# Patient Record
Sex: MALE | Race: WHITE | NOT HISPANIC OR LATINO | Employment: OTHER | URBAN - METROPOLITAN AREA
[De-identification: names, ages, dates, MRNs, and addresses within clinical notes are randomized per-mention and may not be internally consistent; named-entity substitution may affect disease eponyms.]

---

## 2017-01-05 RX ORDER — LEVOTHYROXINE SODIUM 88 UG/1
TABLET ORAL
Qty: 90 TABLET | Refills: 2 | Status: SHIPPED | OUTPATIENT
Start: 2017-01-05 | End: 2017-05-30 | Stop reason: SDUPTHER

## 2017-01-13 ENCOUNTER — OFFICE VISIT (OUTPATIENT)
Dept: FAMILY MEDICINE CLINIC | Facility: CLINIC | Age: 67
End: 2017-01-13

## 2017-01-13 VITALS
DIASTOLIC BLOOD PRESSURE: 60 MMHG | TEMPERATURE: 98.2 F | WEIGHT: 230.4 LBS | BODY MASS INDEX: 32.26 KG/M2 | SYSTOLIC BLOOD PRESSURE: 134 MMHG | HEART RATE: 66 BPM | HEIGHT: 71 IN | OXYGEN SATURATION: 94 % | RESPIRATION RATE: 16 BRPM

## 2017-01-13 DIAGNOSIS — IMO0002 UNCONTROLLED TYPE 2 DIABETES MELLITUS WITH OTHER CIRCULATORY COMPLICATION, WITH LONG-TERM CURRENT USE OF INSULIN: ICD-10-CM

## 2017-01-13 DIAGNOSIS — E03.9 ACQUIRED HYPOTHYROIDISM: ICD-10-CM

## 2017-01-13 DIAGNOSIS — I25.10 CORONARY ARTERIOSCLEROSIS IN NATIVE ARTERY: Primary | ICD-10-CM

## 2017-01-13 DIAGNOSIS — I10 ESSENTIAL HYPERTENSION: ICD-10-CM

## 2017-01-13 LAB
BUN SERPL-MCNC: 23 MG/DL (ref 8–23)
BUN/CREAT SERPL: 19.7 (ref 7–25)
CALCIUM SERPL-MCNC: 9 MG/DL (ref 8.6–10.5)
CHLORIDE SERPL-SCNC: 100 MMOL/L (ref 98–107)
CO2 SERPL-SCNC: 28.3 MMOL/L (ref 22–29)
CREAT SERPL-MCNC: 1.17 MG/DL (ref 0.76–1.27)
GLUCOSE SERPL-MCNC: 153 MG/DL (ref 65–99)
HBA1C MFR BLD: 6.97 % (ref 4.8–5.6)
POTASSIUM SERPL-SCNC: 4.6 MMOL/L (ref 3.5–5.2)
SODIUM SERPL-SCNC: 142 MMOL/L (ref 136–145)
T4 FREE SERPL-MCNC: 1.41 NG/DL (ref 0.93–1.7)
TSH SERPL DL<=0.005 MIU/L-ACNC: 3.51 MIU/ML (ref 0.27–4.2)

## 2017-01-13 PROCEDURE — 99213 OFFICE O/P EST LOW 20 MIN: CPT | Performed by: FAMILY MEDICINE

## 2017-01-13 NOTE — PROGRESS NOTES
HPI  Eric Liu is a 66 y.o. male who is here for follow up especially of diabetes and hypothyroidism.  Denies any new complaints and seems to be doing well.  Has had some weight gain over the holidays etc.      Review of Systems   Eyes:        Followed by eye specialist every 4 months   All other systems reviewed and are negative.        Past Medical History   Diagnosis Date   • Anemia    • CAD (coronary atherosclerotic disease)    • Diabetes mellitus    • Disease of thyroid gland    • Hyperlipidemia    • Hypertension    • Myocardial infarction    • Retinal hemorrhage        Past Surgical History   Procedure Laterality Date   • Coronary artery bypass graft  02/08/2011   • Cataract extraction Bilateral    • Cardiac surgery     • Other surgical history       Laser Suite Ret Treatment Pan-Ablated Inferior Nasal Retina       Family History   Problem Relation Age of Onset   • Heart attack Father    • Hypertension Other        Social History     Social History   • Marital status:      Spouse name: N/A   • Number of children: N/A   • Years of education: N/A     Occupational History   • Not on file.     Social History Main Topics   • Smoking status: Former Smoker     Quit date: 3/8/1986   • Smokeless tobacco: Never Used   • Alcohol use Yes   • Drug use: No   • Sexual activity: Not on file     Other Topics Concern   • Not on file     Social History Narrative         Physical Exam   Constitutional: He is oriented to person, place, and time. He appears well-developed. No distress.   HENT:   Head: Normocephalic.   Mouth/Throat: Oropharynx is clear and moist.   Eyes: Conjunctivae are normal. Pupils are equal, round, and reactive to light.   Neck: No JVD present. No thyromegaly present.   Cardiovascular: Normal rate and regular rhythm.    Pulmonary/Chest: Effort normal. No respiratory distress.   Abdominal: Soft. He exhibits no distension.   Musculoskeletal: He exhibits no edema or deformity.   Neurological: He is alert  and oriented to person, place, and time.   Skin: Skin is warm and dry.   Psychiatric: He has a normal mood and affect. His behavior is normal. Judgment and thought content normal.   Nursing note and vitals reviewed.        Assessment/Plan    Eric was seen today for coronary artery disease, hypertension, hyperlipidemia and diabetes.    Diagnoses and all orders for this visit:    Coronary arteriosclerosis in native artery    Essential hypertension  -     Basic Metabolic Panel    Uncontrolled type 2 diabetes mellitus with other circulatory complication, with long-term current use of insulin  -     Basic Metabolic Panel  -     Hemoglobin A1c    Acquired hypothyroidism  -     TSH+Free T4        Patient is here for routine follow-up especially of diabetes hypertension and hypothyroidism.  No new complaints and seems to be doing extremely well on current regimen.    This note includes information entered using a voice recognition dictation system.  Though reviewed, some nonsensible errors may remain.

## 2017-01-13 NOTE — MR AVS SNAPSHOT
"                        Eric ADAMSON Noe   1/13/2017 10:00 AM   Office Visit    Dept Phone:  [phone]   Encounter #:  [id number]    Provider:  Ralph Chadwick MD   Department:  [facility]                Your Full Care Plan              Your Updated Medication List          This list is accurate as of: 1/13/17 11:02 AM.  Always use your most recent med list.                amLODIPine 5 MG tablet   Commonly known as:  NORVASC   TAKE ONE-HALF TABLET BY MOUTH EVERY MORNING AND TAKE ONE TABLET BY MOUTH EVERY EVENING       aspirin 325 MG tablet       atorvastatin 80 MG tablet   Commonly known as:  LIPITOR   TAKE ONE-HALF TABLET BY MOUTH DAILY       B-D INS SYRINGE 0.5CC/31GX5/16 31G X 5/16\" 0.5 ML misc   Generic drug:  Insulin Syringe-Needle U-100   USE TWO TIMES A DAY       BD PEN NEEDLE JUAN LUIS U/F 32G X 4 MM misc   Generic drug:  Insulin Pen Needle       carvedilol 25 MG tablet   Commonly known as:  COREG   TAKE ONE TABLET BY MOUTH TWO TIMES A DAY       fish oil 1000 MG capsule capsule       HUMULIN 70/30 (70-30) 100 UNIT/ML injection   Generic drug:  insulin NPH-insulin regular   INJECT 45 UNITS UNDER THE SKIN EVERY MORNING AND 38 UNITS UNDER THE SKIN EVERY EVENING       levothyroxine 88 MCG tablet   Commonly known as:  SYNTHROID, LEVOTHROID   TAKE ONE TABLET BY MOUTH DAILY       lisinopril 40 MG tablet   Commonly known as:  PRINIVIL,ZESTRIL   TAKE ONE TABLET BY MOUTH TWICE A DAY       metFORMIN 1000 MG tablet   Commonly known as:  GLUCOPHAGE   TAKE ONE TABLET BY MOUTH TWICE A DAY WITH MEALS               We Performed the Following     Basic Metabolic Panel     Hemoglobin A1c     TSH+Free T4       You Were Diagnosed With        Codes Comments    Coronary arteriosclerosis in native artery    -  Primary ICD-10-CM: I25.10  ICD-9-CM: 414.01     Essential hypertension     ICD-10-CM: I10  ICD-9-CM: 401.9     Uncontrolled type 2 diabetes mellitus with other circulatory complication, " "with long-term current use of insulin     ICD-10-CM: E11.59, Z79.4, E11.65     Acquired hypothyroidism     ICD-10-CM: E03.9  ICD-9-CM: 244.9       Instructions     None    Patient Instructions History      Upcoming Appointments     Visit Type Date Time Department    OFFICE VISIT 2017 10:00 AM [facility]    OFFICE VISIT 2017  8:20 AM [facility] Signup     [facility] allows you to send messages to your doctor, view your test results, renew your prescriptions, schedule appointments, and more. To sign up, go to [employer] and click on the Sign Up Now link in the New User? box. Enter your [facility] [facility] Activation Code exactly as it appears below along with the last four digits of your Social Security Number and your Date of Birth () to complete the sign-up process. If you do not sign up before the expiration date, you must request a new code.    [facility] Activation Code: [email or web address]  Expires: 2017 11:01 AM    If you have questions, you can email [email or web address] or call [phone] to talk to our [facility] [facility] [employer] staff. Remember, [facility] [facility] [employer] is NOT to be used for urgent needs. For medical emergencies, dial 911.               Other Info from Your Visit           Your Appointments     2017  8:20 AM EDT   Office Visit with Ralph Chadwick MD   [facility] (--)    [address] [place] [id number] [phone]           Arrive 15 minutes prior to appointment.              Allergies     No Known Allergies      Reason for Visit     Coronary Artery Disease arteriosclerosis in native artery. 3 month follow up.    Hypertension     Hyperlipidemia     Diabetes           Vital Signs     Blood Pressure Pulse Temperature Respirations Height Weight    134/60 66 98.2 °F (36.8 °C) 16 71\" (180.3 cm) 230 lb 6.4 oz (105 kg)    Oxygen Saturation Body Mass Index Smoking Status             94% 32.13 kg/m2 Former " Smoker         Problems and Diagnoses Noted     Acquired underactive thyroid    Heart disease due to blocked artery    High blood pressure    Type II diabetes mellitus without control

## 2017-01-21 ENCOUNTER — HOSPITAL ENCOUNTER (EMERGENCY)
Facility: HOSPITAL | Age: 67
Discharge: HOME | End: 2017-01-21
Attending: EMERGENCY MEDICINE | Admitting: EMERGENCY MEDICINE

## 2017-01-21 VITALS
SYSTOLIC BLOOD PRESSURE: 148 MMHG | OXYGEN SATURATION: 98 % | TEMPERATURE: 98 F | HEART RATE: 62 BPM | WEIGHT: 220 LBS | DIASTOLIC BLOOD PRESSURE: 68 MMHG | HEIGHT: 69 IN | BODY MASS INDEX: 32.58 KG/M2 | RESPIRATION RATE: 20 BRPM

## 2017-01-21 DIAGNOSIS — E16.2 HYPOGLYCEMIA: Primary | ICD-10-CM

## 2017-01-21 LAB
ALBUMIN SERPL-MCNC: 4.5 G/DL (ref 3.5–5.2)
ALBUMIN/GLOB SERPL: 1.4 G/DL
ALP SERPL-CCNC: 75 U/L (ref 39–117)
ALT SERPL W P-5'-P-CCNC: 19 U/L (ref 1–41)
ANION GAP SERPL CALCULATED.3IONS-SCNC: 15 MMOL/L
AST SERPL-CCNC: 24 U/L (ref 1–40)
BASOPHILS # BLD AUTO: 0.04 10*3/MM3 (ref 0–0.2)
BASOPHILS NFR BLD AUTO: 0.2 % (ref 0–1.5)
BILIRUB SERPL-MCNC: 0.3 MG/DL (ref 0.1–1.2)
BUN BLD-MCNC: 20 MG/DL (ref 8–23)
BUN/CREAT SERPL: 20.2 (ref 7–25)
CALCIUM SPEC-SCNC: 9.2 MG/DL (ref 8.6–10.5)
CHLORIDE SERPL-SCNC: 100 MMOL/L (ref 98–107)
CO2 SERPL-SCNC: 28 MMOL/L (ref 22–29)
CREAT BLD-MCNC: 0.99 MG/DL (ref 0.76–1.27)
DEPRECATED RDW RBC AUTO: 43.3 FL (ref 37–54)
EOSINOPHIL # BLD AUTO: 0.19 10*3/MM3 (ref 0–0.7)
EOSINOPHIL NFR BLD AUTO: 1 % (ref 0.3–6.2)
ERYTHROCYTE [DISTWIDTH] IN BLOOD BY AUTOMATED COUNT: 13.8 % (ref 11.5–14.5)
GFR SERPL CREATININE-BSD FRML MDRD: 75 ML/MIN/1.73
GLOBULIN UR ELPH-MCNC: 3.3 GM/DL
GLUCOSE BLD-MCNC: 152 MG/DL (ref 65–99)
GLUCOSE BLDC GLUCOMTR-MCNC: 124 MG/DL (ref 70–130)
GLUCOSE BLDC GLUCOMTR-MCNC: 192 MG/DL (ref 70–130)
GLUCOSE BLDC GLUCOMTR-MCNC: 78 MG/DL (ref 70–130)
HCT VFR BLD AUTO: 45.1 % (ref 40.4–52.2)
HGB BLD-MCNC: 14.6 G/DL (ref 13.7–17.6)
IMM GRANULOCYTES # BLD: 0.07 10*3/MM3 (ref 0–0.03)
IMM GRANULOCYTES NFR BLD: 0.4 % (ref 0–0.5)
LYMPHOCYTES # BLD AUTO: 2.61 10*3/MM3 (ref 0.9–4.8)
LYMPHOCYTES NFR BLD AUTO: 14.4 % (ref 19.6–45.3)
MCH RBC QN AUTO: 27.9 PG (ref 27–32.7)
MCHC RBC AUTO-ENTMCNC: 32.4 G/DL (ref 32.6–36.4)
MCV RBC AUTO: 86.1 FL (ref 79.8–96.2)
MONOCYTES # BLD AUTO: 1.02 10*3/MM3 (ref 0.2–1.2)
MONOCYTES NFR BLD AUTO: 5.6 % (ref 5–12)
NEUTROPHILS # BLD AUTO: 14.18 10*3/MM3 (ref 1.9–8.1)
NEUTROPHILS NFR BLD AUTO: 78.4 % (ref 42.7–76)
PLATELET # BLD AUTO: 280 10*3/MM3 (ref 140–500)
PMV BLD AUTO: 11.1 FL (ref 6–12)
POTASSIUM BLD-SCNC: 4.2 MMOL/L (ref 3.5–5.2)
PROT SERPL-MCNC: 7.8 G/DL (ref 6–8.5)
RBC # BLD AUTO: 5.24 10*6/MM3 (ref 4.6–6)
SODIUM BLD-SCNC: 143 MMOL/L (ref 136–145)
WBC NRBC COR # BLD: 18.11 10*3/MM3 (ref 4.5–10.7)

## 2017-01-21 PROCEDURE — 99285 EMERGENCY DEPT VISIT HI MDM: CPT

## 2017-01-21 PROCEDURE — 80053 COMPREHEN METABOLIC PANEL: CPT | Performed by: NURSE PRACTITIONER

## 2017-01-21 PROCEDURE — 85025 COMPLETE CBC W/AUTO DIFF WBC: CPT | Performed by: NURSE PRACTITIONER

## 2017-01-21 PROCEDURE — 82962 GLUCOSE BLOOD TEST: CPT

## 2017-01-21 NOTE — ED NOTES
WIFE CALLED EMS DUE TO PATIENT BEING SEMI-CONSCIOUS. EMS REPORT INITIAL BLOOD SUGAR OF 27. EMS GAVE 2 TUBES OF ORAL GLUCOSE AND BLOOD SUGAR IS NOW 57. PT IS AWAKE, BUT REMAINS CONFUSED.     Jelena Jean RN  01/21/17 5761

## 2017-01-21 NOTE — ED PROVIDER NOTES
EMERGENCY DEPARTMENT ENCOUNTER    CHIEF COMPLAINT  Chief Complaint: hypogycemia  History given by:patient, family  History limited by:nothing  Room Number: HALA/A  PMD: Ralph Chadwick MD      HPI:  Pt is a 66 y.o. male who presents with hypoglycemia. Per family, pt was found unresponsive about 2 hours ago and EMS was called. Pt is diabetic and states that he took his normal dose of insulin and and ate a normal breakfast this morning. Pt denies cough, fever, NVD, congestion, dizziness, lightheadedness, or any other symptoms at this time. Pt states that he saw PCP earlier this week and his A1C= 6.9. His insulin dose was not changed during this visit. Pt states that he feels much better after eating lunch.    Duration: PTA  Timing:constant  Location:N/A  Radiation:N/A  Quality:N/A  Intensity/Severity:moderate  Progression:improving  Associated Symptoms:none stated  Aggravating Factors:none stated  Alleviating Factors:none stated  Previous Episodes:Pt is diabetic and states similar symptoms have occurred in the past.  Treatment before arrival:Insulin    PAST MEDICAL HISTORY  Active Ambulatory Problems     Diagnosis Date Noted   • Coronary arteriosclerosis in native artery 03/08/2016   • Hyperlipidemia 03/08/2016   • Uncontrolled type 2 diabetes mellitus 03/08/2016   • Hypertension 03/08/2016   • Fatigue 06/15/2016   • Acquired hypothyroidism 07/07/2016   • Lipoma of right upper extremity 07/07/2016   • Decreased dorsalis pedis pulse 09/14/2016   • Decreased pedal pulses 09/14/2016     Resolved Ambulatory Problems     Diagnosis Date Noted   • Feeling of chest tightness 06/15/2016     Past Medical History   Diagnosis Date   • Anemia    • CAD (coronary atherosclerotic disease)    • Diabetes mellitus    • Disease of thyroid gland    • Myocardial infarction    • Retinal hemorrhage        PAST SURGICAL HISTORY  Past Surgical History   Procedure Laterality Date   • Coronary artery bypass graft  02/08/2011   • Cataract  extraction Bilateral    • Cardiac surgery     • Other surgical history       Laser Suite Ret Treatment Pan-Ablated Inferior Nasal Retina       FAMILY HISTORY  Family History   Problem Relation Age of Onset   • Heart attack Father    • Hypertension Other        SOCIAL HISTORY  Social History     Social History   • Marital status:      Spouse name: N/A   • Number of children: N/A   • Years of education: N/A     Occupational History   • Not on file.     Social History Main Topics   • Smoking status: Former Smoker     Quit date: 3/8/1986   • Smokeless tobacco: Never Used   • Alcohol use Yes   • Drug use: No   • Sexual activity: Not on file     Other Topics Concern   • Not on file     Social History Narrative         ALLERGIES  Review of patient's allergies indicates no known allergies.    REVIEW OF SYSTEMS  Review of Systems   Constitutional: Negative.  Negative for activity change, appetite change ( decreased), chills and fever.        Hypoglycemia   HENT: Negative for congestion, ear pain, rhinorrhea, sinus pressure and sore throat.    Eyes: Negative.    Respiratory: Negative.  Negative for cough and shortness of breath.    Cardiovascular: Negative.  Negative for chest pain, palpitations and leg swelling ( pedal).   Gastrointestinal: Negative for abdominal pain, diarrhea, nausea and vomiting.   Endocrine: Negative.    Genitourinary: Negative.  Negative for decreased urine volume, difficulty urinating, dysuria, frequency and urgency.   Musculoskeletal: Negative.  Negative for back pain.   Skin: Negative.  Negative for rash.   Allergic/Immunologic: Negative.    Neurological: Negative.  Negative for dizziness, weakness, light-headedness, numbness and headaches.   Hematological: Negative.    Psychiatric/Behavioral: Negative.  The patient is not nervous/anxious.    All other systems reviewed and are negative.      PHYSICAL EXAM  ED Triage Vitals   Temp Heart Rate Resp BP SpO2   01/21/17 1224 01/21/17 1223 01/21/17  1223 01/21/17 1223 01/21/17 1223   98 °F (36.7 °C) 53 16 150/66 97 %      Temp src Heart Rate Source Patient Position BP Location FiO2 (%)   -- -- -- -- --              Physical Exam   Constitutional: He is oriented to person, place, and time and well-developed, well-nourished, and in no distress. No distress.   HENT:   Head: Normocephalic.   Mouth/Throat: Oropharynx is clear and moist and mucous membranes are normal.   Eyes: Pupils are equal, round, and reactive to light.   Neck: Normal range of motion.   Cardiovascular: Normal rate, regular rhythm and normal heart sounds.    Pulmonary/Chest: Effort normal and breath sounds normal. He has no wheezes.   Abdominal: Soft. Bowel sounds are normal. There is no tenderness.   Musculoskeletal: Normal range of motion. He exhibits no edema.   Neurological: He is alert and oriented to person, place, and time. He has normal sensation and normal strength.   Skin: Skin is warm and dry. No rash noted.   Psychiatric: Mood, memory, affect and judgment normal.   Nursing note and vitals reviewed.      LAB RESULTS  Recent Results (from the past 24 hour(s))   POC Glucose Fingerstick    Collection Time: 01/21/17 12:42 PM   Result Value Ref Range    Glucose 78 70 - 130 mg/dL   POC Glucose Fingerstick    Collection Time: 01/21/17  1:16 PM   Result Value Ref Range    Glucose 124 70 - 130 mg/dL   Comprehensive Metabolic Panel    Collection Time: 01/21/17  1:33 PM   Result Value Ref Range    Glucose 152 (H) 65 - 99 mg/dL    BUN 20 8 - 23 mg/dL    Creatinine 0.99 0.76 - 1.27 mg/dL    Sodium 143 136 - 145 mmol/L    Potassium 4.2 3.5 - 5.2 mmol/L    Chloride 100 98 - 107 mmol/L    CO2 28.0 22.0 - 29.0 mmol/L    Calcium 9.2 8.6 - 10.5 mg/dL    Total Protein 7.8 6.0 - 8.5 g/dL    Albumin 4.50 3.50 - 5.20 g/dL    ALT (SGPT) 19 1 - 41 U/L    AST (SGOT) 24 1 - 40 U/L    Alkaline Phosphatase 75 39 - 117 U/L    Total Bilirubin 0.3 0.1 - 1.2 mg/dL    eGFR Non African Amer 75 >60 mL/min/1.73    Globulin  3.3 gm/dL    A/G Ratio 1.4 g/dL    BUN/Creatinine Ratio 20.2 7.0 - 25.0    Anion Gap 15.0 mmol/L   CBC Auto Differential    Collection Time: 01/21/17  1:33 PM   Result Value Ref Range    WBC 18.11 (H) 4.50 - 10.70 10*3/mm3    RBC 5.24 4.60 - 6.00 10*6/mm3    Hemoglobin 14.6 13.7 - 17.6 g/dL    Hematocrit 45.1 40.4 - 52.2 %    MCV 86.1 79.8 - 96.2 fL    MCH 27.9 27.0 - 32.7 pg    MCHC 32.4 (L) 32.6 - 36.4 g/dL    RDW 13.8 11.5 - 14.5 %    RDW-SD 43.3 37.0 - 54.0 fl    MPV 11.1 6.0 - 12.0 fL    Platelets 280 140 - 500 10*3/mm3    Neutrophil % 78.4 (H) 42.7 - 76.0 %    Lymphocyte % 14.4 (L) 19.6 - 45.3 %    Monocyte % 5.6 5.0 - 12.0 %    Eosinophil % 1.0 0.3 - 6.2 %    Basophil % 0.2 0.0 - 1.5 %    Immature Grans % 0.4 0.0 - 0.5 %    Neutrophils, Absolute 14.18 (H) 1.90 - 8.10 10*3/mm3    Lymphocytes, Absolute 2.61 0.90 - 4.80 10*3/mm3    Monocytes, Absolute 1.02 0.20 - 1.20 10*3/mm3    Eosinophils, Absolute 0.19 0.00 - 0.70 10*3/mm3    Basophils, Absolute 0.04 0.00 - 0.20 10*3/mm3    Immature Grans, Absolute 0.07 (H) 0.00 - 0.03 10*3/mm3   POC Glucose Fingerstick    Collection Time: 01/21/17  2:14 PM   Result Value Ref Range    Glucose 192 (H) 70 - 130 mg/dL       I ordered the above labs and reviewed the results      PROGRESS AND CONSULTS  1243- Ordered blood work for further evaluation.    1321- Discussed with pt and family plan to monitor pt while in the ER and discharge home if his glucose levels remain stable. Pt and family understand and agree to the plan. All questions addressed.    1430- Reviewed pt's history and workup with Dr. Silveira.  After a bedside evaluation; Dr Silveira agrees with the plan of care.    COURSE & MEDICAL DECISION MAKING  Pertinent Labs that were ordered and reviewed are noted above.  Results were reviewed/discussed with the patient and they were also made aware of online assess.   Pt also made aware that some labs, such as cultures, will not be resulted during ER visit and follow up  "with PMD is necessary.     MEDICATIONS GIVEN IN ER  Medications - No data to display    Visit Vitals   • /68 (BP Location: Left arm)   • Pulse 62   • Temp 98 °F (36.7 °C)   • Resp 20   • Ht 69\" (175.3 cm)   • Wt 220 lb (99.8 kg)   • SpO2 98%   • BMI 32.49 kg/m2       Discussed all results and noted any abnormalities with patient.  Discussed absoute need to recheck abnormalities with PMD.    Reviewed implications of results, diagnosis, meds, responsibility to follow up, warning signs and symptoms of possible worsening, potential complications and reasons to return to ER with patient    Discussed plan for discharge, as there is no emergent indication for admission.  Pt is agreeable and understands need for follow up and repeat testing.  Pt is aware that discharge does not mean that nothing is wrong but it indicates no emergency is present and they must continue care with PMD.  Pt is discharged with instructions to follow up with primary care doctor to have their blood pressure rechecked.       DIAGNOSIS  Final diagnoses:   Hypoglycemia       FOLLOW UP   Ralph Chadwick MD  [address] [place] [address]  [phone]            RX     Medication List      Changed          HUMULIN 70/30 (70-30) 100 UNIT/ML injection   Generic drug:  insulin NPH-insulin regular   INJECT 45 UNITS UNDER THE SKIN EVERY MORNING AND 38 UNITS UNDER THE SKIN   EVERY EVENING   What changed:  See the new instructions.           I personally reviewed the past medical history, past surgical history, social history, family history, current medications and allergies as they appear in this chart.  The scribe's note accurately reflects the work and decisions made by me.             I personally scribed for Elysia Persaud on 1/21/2017 at 3:57 PM.  Electronically signed by Liana Oakley on 1/21/2017 at time 3:57 PM       Criselda Oakley  01/21/17 1312            Criselda Oakley  01/21/17 1516       Elysia Persaud, " APRN  01/21/17 1558

## 2017-01-21 NOTE — DISCHARGE INSTRUCTIONS
Pt instructions:  Rest  Decrease Humalog insulin to 35 unit in morning and 33 units at night  Check your blood sugar prior to each insulin dose  Follow up with primary care doctor on monday  Follow up with Primary Care Doctor for further management and to have your blood pressure rechecked.   Return to ER with pain, swelling, numbness/tingling, fever, chills, weakness, nausea, vomiting, diarrhea, abdominal pain, back pain, urinary concerns, chest pain, shortness of breath, dizziness, headache, worsening of symptoms or other concerns.

## 2017-01-21 NOTE — ED PROVIDER NOTES
I supervised care provided by the midlevel provider.    We have discussed this patient's history, physical exam, and treatment plan.   I have reviewed the note and personally saw and examined the patient and agree with the plan of care.    Pt reports that he has h/o DM for which he is on an insulin regimen (humulog 40 units every AM; 38 units every PM). Pt reports that he took his normal insulin dose today and ate breakfast. Shortly afterwards, pt became lightheaded. Per spouse, pt became minimally responsive due to which she called the paramedics. Upon paramedics' arrival to pt's home, pt's blood glucose was 27; consequently, pt was administered 2 tubes of oral glucose after which pt's BG increased to 57. Pt denies N/V/D, abd pain, CP, dyspnea. Pt reports that he has had a similar episode of hypoglycemia in the past. On physical exam, pt is alert and oriented x3. Heart is RRR. Lungs are CTAB. Blood glucose in the ER is 192. Pt advised to monitor his blood glucose twice daily and to f/u with PMD. Pt was also advised to decrease his Humalog insulin to 35 units in the AM and 33 units in the PM.  RTER warnings given.          Documentation assistance provided by Benson Parekh. Information recorded by the scribe was done at my direction and has been verified and validated by me.     Entered by Benson Parekh, acting as scribe for Dr. Raoul MD.             Benson Parekh  01/21/17 2015       Elliot Silveira MD  01/21/17 2028

## 2017-01-24 ENCOUNTER — ANESTHESIA (OUTPATIENT)
Dept: GASTROENTEROLOGY | Facility: HOSPITAL | Age: 67
End: 2017-01-24

## 2017-01-24 ENCOUNTER — ANESTHESIA EVENT (OUTPATIENT)
Dept: GASTROENTEROLOGY | Facility: HOSPITAL | Age: 67
End: 2017-01-24

## 2017-01-24 ENCOUNTER — DOCUMENTATION (OUTPATIENT)
Dept: SOCIAL WORK | Facility: HOSPITAL | Age: 67
End: 2017-01-24

## 2017-01-24 PROCEDURE — 25010000002 PROPOFOL 10 MG/ML EMULSION: Performed by: ANESTHESIOLOGY

## 2017-01-24 RX ORDER — PROPOFOL 10 MG/ML
VIAL (ML) INTRAVENOUS AS NEEDED
Status: DISCONTINUED | OUTPATIENT
Start: 2017-01-24 | End: 2017-01-24 | Stop reason: SURG

## 2017-01-24 RX ORDER — PROPOFOL 10 MG/ML
VIAL (ML) INTRAVENOUS CONTINUOUS PRN
Status: DISCONTINUED | OUTPATIENT
Start: 2017-01-24 | End: 2017-01-24 | Stop reason: SURG

## 2017-01-24 RX ORDER — LIDOCAINE HYDROCHLORIDE 20 MG/ML
INJECTION, SOLUTION INFILTRATION; PERINEURAL AS NEEDED
Status: DISCONTINUED | OUTPATIENT
Start: 2017-01-24 | End: 2017-01-24 | Stop reason: SURG

## 2017-01-24 RX ADMIN — PROPOFOL 20 MG: 10 INJECTION, EMULSION INTRAVENOUS at 09:05

## 2017-01-24 RX ADMIN — PROPOFOL 80 MG: 10 INJECTION, EMULSION INTRAVENOUS at 09:03

## 2017-01-24 RX ADMIN — SODIUM CHLORIDE, POTASSIUM CHLORIDE, SODIUM LACTATE AND CALCIUM CHLORIDE: 600; 310; 30; 20 INJECTION, SOLUTION INTRAVENOUS at 09:01

## 2017-01-24 RX ADMIN — PROPOFOL 200 MCG/KG/MIN: 10 INJECTION, EMULSION INTRAVENOUS at 09:03

## 2017-01-24 RX ADMIN — LIDOCAINE HYDROCHLORIDE 40 MG: 20 INJECTION, SOLUTION INFILTRATION; PERINEURAL at 09:02

## 2017-01-24 NOTE — ANESTHESIA PREPROCEDURE EVALUATION
Anesthesia Evaluation     Patient summary reviewed and Nursing notes reviewed    Airway   Mallampati: II  TM distance: <3 FB  Neck ROM: full  no difficulty expected  Dental    (+) upper dentures    Pulmonary    (+) hx of smoking, decreased breath sounds,   Cardiovascular - normal exam  (+) hypertension, past MI , CAD, CABG,     ECG reviewed  Patient on routine beta blocker    Neuro/Psych  GI/Hepatic/Renal/Endo    (+)  diabetes mellitus type 1 using insulin, hypothyroidism,     Musculoskeletal     Abdominal  - normal exam    Bowel sounds: normal.   Substance History      OB/GYN          Other                           Anesthesia Plan    ASA 3     MAC     intravenous induction   Anesthetic plan and risks discussed with patient.

## 2017-01-24 NOTE — ANESTHESIA POSTPROCEDURE EVALUATION
Patient: Eric ADAMSON Noe    Procedure Summary     Date Anesthesia Start Anesthesia Stop Room / Location    01/24/17 0901 0939  [facility] /  [facility]       Procedure Diagnosis Surgeon Provider    COLONOSCOPY TO CECUM AND TERMINAL ILEUM WITH COLD BIOPSY AND HOT SNARE POLYPECTOMIES, INJECTED WITH NORMAL SALINE 3ML, INJECTED WITH SPOT INK 5ML, AND BIOPSIES (N/A ) Colon polyps  (Colon polyps [K63.5]) MD Jeramy Santamaria MD          Anesthesia Type: MAC  Last vitals  BP      Temp      Pulse     Resp      SpO2        Post Anesthesia Care and Evaluation    Patient location during evaluation: bedside  Patient participation: complete - patient participated  Level of consciousness: awake  Pain score: 1  Pain management: adequate  Airway patency: patent  Anesthetic complications: No anesthetic complications    Cardiovascular status: acceptable  Respiratory status: acceptable  Hydration status: acceptable

## 2017-01-29 ENCOUNTER — PREP FOR SURGERY (OUTPATIENT)
Dept: GASTROENTEROLOGY | Facility: CLINIC | Age: 67
End: 2017-01-29

## 2017-01-29 DIAGNOSIS — K63.5 COLON POLYPS: Primary | ICD-10-CM

## 2017-01-29 RX ORDER — SODIUM CHLORIDE 0.9 % (FLUSH) 0.9 %
1-10 SYRINGE (ML) INJECTION AS NEEDED
Status: CANCELLED | OUTPATIENT
Start: 2017-01-29

## 2017-02-23 RX ORDER — CARVEDILOL 25 MG/1
TABLET ORAL
Qty: 180 TABLET | Refills: 0 | Status: SHIPPED | OUTPATIENT
Start: 2017-02-23 | End: 2017-05-25 | Stop reason: SDUPTHER

## 2017-03-06 RX ORDER — AMLODIPINE BESYLATE 5 MG/1
TABLET ORAL
Qty: 45 TABLET | Refills: 3 | Status: SHIPPED | OUTPATIENT
Start: 2017-03-06 | End: 2017-05-30 | Stop reason: SDUPTHER

## 2017-03-13 ENCOUNTER — OFFICE VISIT (OUTPATIENT)
Dept: CARDIOLOGY | Facility: CLINIC | Age: 67
End: 2017-03-13

## 2017-03-13 VITALS
HEART RATE: 72 BPM | DIASTOLIC BLOOD PRESSURE: 88 MMHG | HEIGHT: 71 IN | WEIGHT: 231 LBS | SYSTOLIC BLOOD PRESSURE: 160 MMHG | BODY MASS INDEX: 32.34 KG/M2

## 2017-03-13 DIAGNOSIS — I10 ESSENTIAL HYPERTENSION: ICD-10-CM

## 2017-03-13 DIAGNOSIS — I25.10 CORONARY ARTERIOSCLEROSIS IN NATIVE ARTERY: Primary | ICD-10-CM

## 2017-03-13 DIAGNOSIS — IMO0002 UNCONTROLLED TYPE 2 DIABETES MELLITUS WITH OTHER CIRCULATORY COMPLICATION, WITH LONG-TERM CURRENT USE OF INSULIN: ICD-10-CM

## 2017-03-13 PROCEDURE — 93000 ELECTROCARDIOGRAM COMPLETE: CPT | Performed by: INTERNAL MEDICINE

## 2017-03-13 PROCEDURE — 99213 OFFICE O/P EST LOW 20 MIN: CPT | Performed by: INTERNAL MEDICINE

## 2017-03-13 RX ORDER — ATORVASTATIN CALCIUM 80 MG/1
TABLET, FILM COATED ORAL
Qty: 45 TABLET | Refills: 1 | Status: SHIPPED | OUTPATIENT
Start: 2017-03-13 | End: 2017-05-30 | Stop reason: SDUPTHER

## 2017-03-13 NOTE — PROGRESS NOTES
Date of Office Visit: 2017  Encounter Provider: Sharri Garcia MD  Place of Service: [facility] CARDIOLOGY  Patient Name: Eric Liu  :1950    Chief complaint   Follow-up of coronary artery disease, carotid artery disease    History of Present Illness  The patient is a pleasant 66-year-old gentleman with diabetes, hypertension,  hyperlipidemia, who has a history of coronary artery bypass grafting in  with prior  inferior wall infarction. He has a normal systolic function. He underwent coronary artery  bypass grafting with a LIMA graft to the LAD, vein graft to the RV branch, as well as a  vein graft to the distal PDA and second obtuse marginal branch, circumflex artery and  third obtuse marginal branch. He had postoperative fluid retention that resolved with  diuretics. He has had intermittent chest pain since then and has had a couple of stress  tests since then. His last stress test in 2013 revealed a small inferior wall  infarction with minimal amount of richard-infarct ischemia. His ejection fraction was felt to  be possibly reduced at 45%. It was unchanged from his prior study a year earlier. However,  he had an echocardiogram that revealed normal left ventricular size and function with  hypokinesis of the inferior wall with a 61% ejection fraction. There was grade I  diastolic dysfunction, moderate left ventricular hypertrophy, no significant valvular  dysfunction. He was treated medically.  2016 he was seen for worsening shortness of breath and fatigue a stress echocardiogram revealed no ischemia at a good workload with no significant valvular dysfunction and with normal systolic function.    Since last visit he denies any chest pain, shortness of breath, palpitations, syncope.  He had an episode of hypoglycemia on .  This is a rare occurrence for him.  He also had colonoscopy on .  There was a splenic lecture polyp that was not able to  "be retrieved repeat colonoscopy is to be done in April to try to extract this.  He is otherwise active walking 2-3 miles every other day.  He made a recent trip to [place] for game and admits to dietary indiscretions with salt and beer not surprisingly his blood pressure is quite elevated.  He has also gained 8 pounds in the past several months    Past Medical History   Diagnosis Date   • Acute bronchitis, unspecified    • Anemia    • Atherosclerotic heart disease of native coronary artery without angina pectoris    • CAD (coronary atherosclerotic disease)    • Chest pain    • Diabetes mellitus    • Disease of thyroid gland    • High risk medication use    • History of transfusion    • Hyperlipidemia    • Hyperplasia of prostate without urinary obstruction    • Hypertension    • Hypothyroidism (acquired)    • Lipoma of right upper extremity    • Myocardial infarction    • Retinal hemorrhage    • Type 2 diabetes mellitus      Past Surgical History   Procedure Laterality Date   • Coronary artery bypass graft  02/08/2011   • Cataract extraction Bilateral    • Cardiac surgery     • Other surgical history       Laser Suite Ret Treatment Pan-Ablated Inferior Nasal Retina   • Tonsillectomy     • Colonoscopy     • Colonoscopy N/A 1/24/2017     Procedure: COLONOSCOPY TO CECUM AND TERMINAL ILEUM WITH COLD BIOPSY AND HOT SNARE POLYPECTOMIES, INJECTED WITH NORMAL SALINE 3ML, INJECTED WITH SPOT INK 5ML, AND BIOPSIES;  Surgeon: Mushtaq Booth MD;  Location: [facility] ENDOSCOPY;  Service:      Outpatient Medications Prior to Visit   Medication Sig Dispense Refill   • amLODIPine (NORVASC) 5 MG tablet TAKE ONE-HALF TABLET BY MOUTH EVERY MORNING AND TAKE ONE TABLET BY MOUTH EVERY EVENING 45 tablet 3   • aspirin 325 MG tablet Take 1 tablet by mouth daily.     • B-D INS SYRINGE 0.5CC/31GX5/16 31G X 5/16\" 0.5 ML misc USE TWO TIMES A  each 1   • carvedilol (COREG) 25 MG tablet TAKE ONE TABLET BY MOUTH TWICE A  tablet 0   • " insulin NPH-insulin regular (HUMULIN 70/30) (70-30) 100 UNIT/ML injection 35 units QAM; 30 units QPM 30 mL 11   • Insulin Pen Needle (BD PEN NEEDLE JUAN LUIS U/F) 32G X 4 MM misc 2 (two) times a day. Use to inject insulin     • levothyroxine (SYNTHROID, LEVOTHROID) 88 MCG tablet TAKE ONE TABLET BY MOUTH DAILY 90 tablet 2   • lisinopril (PRINIVIL,ZESTRIL) 40 MG tablet TAKE ONE TABLET BY MOUTH TWICE A DAY 60 tablet 5   • metFORMIN (GLUCOPHAGE) 1000 MG tablet TAKE ONE TABLET BY MOUTH TWICE A DAY WITH MEALS 60 tablet 2   • Omega-3 Fatty Acids (FISH OIL) 1000 MG capsule capsule Take 1,000 mg by mouth 2 (two) times a day with meals.     • atorvastatin (LIPITOR) 80 MG tablet TAKE ONE-HALF TABLET BY MOUTH DAILY 45 tablet 0     No facility-administered medications prior to visit.      Allergies as of 03/13/2017   • (No Known Allergies)     Social History     Social History   • Marital status:      Spouse name: N/A   • Number of children: N/A   • Years of education: N/A     Occupational History   • Not on file.     Social History Main Topics   • Smoking status: Former Smoker     Quit date: 3/8/1986   • Smokeless tobacco: Never Used   • Alcohol use Yes      Comment: occasionally   • Drug use: No   • Sexual activity: Not on file     Other Topics Concern   • Not on file     Social History Narrative     Family History   Problem Relation Age of Onset   • Heart attack Father    • Hypertension Other      Review of Systems   Constitution: Negative for fever, malaise/fatigue, weight gain and weight loss.   HENT: Negative for ear pain, hearing loss, nosebleeds and sore throat.    Eyes: Negative for double vision, pain, vision loss in left eye and vision loss in right eye.   Cardiovascular:        See history of present illness.   Respiratory: Negative for cough, shortness of breath, sleep disturbances due to breathing, snoring and wheezing.    Endocrine: Negative for cold intolerance, heat intolerance and polyuria.   Skin: Negative  "for itching, poor wound healing and rash.   Musculoskeletal: Negative for joint pain, joint swelling and myalgias.   Gastrointestinal: Negative for abdominal pain, diarrhea, hematochezia, nausea and vomiting.   Genitourinary: Negative for hematuria and hesitancy.   Neurological: Negative for numbness, paresthesias and seizures.   Psychiatric/Behavioral: Negative for depression. The patient is not nervous/anxious.      Objective:     Vitals:    03/13/17 1217   BP: 160/88   BP Location: Left arm   Patient Position: Sitting   Pulse: 72   Weight: 231 lb (105 kg)   Height: 71\" (180.3 cm)     Body mass index is 32.22 kg/(m^2).    Physical Exam   Constitutional: He is oriented to person, place, and time. He appears well-developed and well-nourished.   Obese   HENT:   Head: Normocephalic.   Nose: Nose normal.   Mouth/Throat: Oropharynx is clear and moist.   Eyes: Conjunctivae and EOM are normal. Pupils are equal, round, and reactive to light. Right eye exhibits no discharge. No scleral icterus.   Neck: Normal range of motion. Neck supple. No JVD present. No thyromegaly present.   Cardiovascular: Normal rate, regular rhythm, normal heart sounds and intact distal pulses.  Exam reveals no gallop and no friction rub.    No murmur heard.  Pulses:       Carotid pulses are 2+ on the right side, and 2+ on the left side.       Radial pulses are 2+ on the right side, and 2+ on the left side.        Femoral pulses are 2+ on the right side, and 2+ on the left side.       Popliteal pulses are 2+ on the right side, and 2+ on the left side.        Dorsalis pedis pulses are 2+ on the right side, and 2+ on the left side.        Posterior tibial pulses are 2+ on the right side, and 2+ on the left side.   Pulmonary/Chest: Effort normal and breath sounds normal. No respiratory distress. He has no wheezes. He has no rales.   Abdominal: Soft. Bowel sounds are normal. He exhibits no distension. There is no hepatosplenomegaly. There is no " tenderness. There is no rebound.   Musculoskeletal: Normal range of motion. He exhibits no edema or tenderness.   Neurological: He is alert and oriented to person, place, and time.   Skin: Skin is warm and dry. No rash noted. No erythema.   Psychiatric: He has a normal mood and affect. His behavior is normal. Judgment and thought content normal.   Vitals reviewed.    Lab Review:     ECG 12 Lead  Date/Time: 3/13/2017 9:10 PM  Performed by: ANN RODNEY  Authorized by: ANN RODNEY   Comparison: compared with previous ECG   Similar to previous ECG  Rhythm: sinus rhythm  Conduction comments: Nonspecific ST T wave changes  QTc = 43 msec  Clinical impression: abnormal ECG          Assessment:       Diagnosis Plan   1. Coronary arteriosclerosis in native artery     2. Essential hypertension     3. Uncontrolled type 2 diabetes mellitus with other circulatory complication, with long-term current use of insulin       Plan:       1.  Coronary artery disease with history of prior myocardial infarction and coronary artery bypass grafting.  With a negative stress test in 2016.   2.  Carotid artery disease, negative carotid Doppler and March 2016  3.  Hypertension.    4.  Hyperlipidemia.  5.  Diabetes  6.  Decreased distal pulses.  ALYX checked 9/16 were normal    Coronary Artery Disease  Assessment  • The patient has no angina    Plan  • Lifestyle modifications discussed include adhering to a heart healthy diet, maintenance of a healthy weight, regular exercise and regular monitoring of cholesterol and blood pressure    Subjective - Objective  • There is a history of past MI  • There is a history of previous coronary artery bypass graft  • Current antiplatelet therapy includes aspirin 325 mg         Eric Liu   Home Medication Instructions SURESH:    Printed on:03/13/17 5586   Medication Information                      amLODIPine (NORVASC) 5 MG tablet  TAKE ONE-HALF TABLET BY MOUTH EVERY MORNING AND TAKE ONE TABLET BY MOUTH EVERY  "EVENING             aspirin 325 MG tablet  Take 1 tablet by mouth daily.             atorvastatin (LIPITOR) 80 MG tablet  TAKE ONE-HALF TABLET BY MOUTH DAILY             B-D INS SYRINGE 0.5CC/31GX5/16 31G X 5/16\" 0.5 ML misc  USE TWO TIMES A DAY             carvedilol (COREG) 25 MG tablet  TAKE ONE TABLET BY MOUTH TWICE A DAY             insulin NPH-insulin regular (HUMULIN 70/30) (70-30) 100 UNIT/ML injection  35 units QAM; 30 units QPM             Insulin Pen Needle (BD PEN NEEDLE JUAN LUIS U/F) 32G X 4 MM misc  2 (two) times a day. Use to inject insulin             levothyroxine (SYNTHROID, LEVOTHROID) 88 MCG tablet  TAKE ONE TABLET BY MOUTH DAILY             lisinopril (PRINIVIL,ZESTRIL) 40 MG tablet  TAKE ONE TABLET BY MOUTH TWICE A DAY             metFORMIN (GLUCOPHAGE) 1000 MG tablet  TAKE ONE TABLET BY MOUTH TWICE A DAY WITH MEALS             Omega-3 Fatty Acids (FISH OIL) 1000 MG capsule capsule  Take 1,000 mg by mouth 2 (two) times a day with meals.               EMR Dragon/Transcription disclaimer:   Much of this encounter note is an electronic transcription/translation of spoken language to printed text. The electronic translation of spoken language may permit erroneous, or at times, nonsensical words or phrases to be inadvertently transcribed; Although I have reviewed the note for such errors, some may still exist.   "

## 2017-04-18 ENCOUNTER — OFFICE VISIT (OUTPATIENT)
Dept: FAMILY MEDICINE CLINIC | Facility: CLINIC | Age: 67
End: 2017-04-18

## 2017-04-18 VITALS
WEIGHT: 226 LBS | TEMPERATURE: 97.7 F | HEIGHT: 71 IN | DIASTOLIC BLOOD PRESSURE: 60 MMHG | SYSTOLIC BLOOD PRESSURE: 142 MMHG | HEART RATE: 62 BPM | OXYGEN SATURATION: 98 % | BODY MASS INDEX: 31.64 KG/M2

## 2017-04-18 DIAGNOSIS — I10 ESSENTIAL HYPERTENSION: ICD-10-CM

## 2017-04-18 DIAGNOSIS — E03.9 ACQUIRED HYPOTHYROIDISM: ICD-10-CM

## 2017-04-18 DIAGNOSIS — Z79.4 CONTROLLED TYPE 2 DIABETES MELLITUS WITHOUT COMPLICATION, WITH LONG-TERM CURRENT USE OF INSULIN (HCC): ICD-10-CM

## 2017-04-18 DIAGNOSIS — E11.9 CONTROLLED TYPE 2 DIABETES MELLITUS WITHOUT COMPLICATION, WITH LONG-TERM CURRENT USE OF INSULIN (HCC): ICD-10-CM

## 2017-04-18 DIAGNOSIS — E78.5 HYPERLIPIDEMIA, UNSPECIFIED HYPERLIPIDEMIA TYPE: ICD-10-CM

## 2017-04-18 DIAGNOSIS — I25.10 CORONARY ARTERIOSCLEROSIS IN NATIVE ARTERY: Primary | ICD-10-CM

## 2017-04-18 DIAGNOSIS — Z79.899 HIGH RISK MEDICATION USE: ICD-10-CM

## 2017-04-18 PROCEDURE — 99214 OFFICE O/P EST MOD 30 MIN: CPT | Performed by: FAMILY MEDICINE

## 2017-04-18 NOTE — PROGRESS NOTES
HPI  Eric Liu is a 66 y.o. male who is here for follow up of diabetes on insulin therapy as well as hypertension and known coronary artery disease.  Patient also is followed by cardiologist and recently had a colonoscopy with planned follow-up colonoscopy in a few weeks as noted below.  Patient did have a hypoglycemic episode approximately 3 months ago.  His insulin dosage was decreased slightly and he reports no further difficulties.  Reviewed most recent lab test which did show A1c just below 7.  Also lab tests in the emergency room noted and unremarkable except for elevated white blood cell count most likely related to hypoglycemic episode.  Will be rechecked.      Review of Systems   Eyes:        Followed by ophthalmologist every 6 months   Respiratory: Negative for cough and shortness of breath.    Cardiovascular: Negative for chest pain and leg swelling.   Genitourinary:        Recently seen by urologist for annual prostate exam and lab work.   All other systems reviewed and are negative.        Past Medical History:   Diagnosis Date   • Acute bronchitis, unspecified    • Anemia    • Atherosclerotic heart disease of native coronary artery without angina pectoris    • CAD (coronary atherosclerotic disease)    • Chest pain    • Diabetes mellitus    • Disease of thyroid gland    • High risk medication use    • History of transfusion    • Hyperlipidemia    • Hyperplasia of prostate without urinary obstruction    • Hypertension    • Hypothyroidism (acquired)    • Lipoma of right upper extremity    • Myocardial infarction    • Retinal hemorrhage    • Type 2 diabetes mellitus        Past Surgical History:   Procedure Laterality Date   • CARDIAC SURGERY     • CATARACT EXTRACTION Bilateral    • COLONOSCOPY     • COLONOSCOPY N/A 1/24/2017    Procedure: COLONOSCOPY TO CECUM AND TERMINAL ILEUM WITH COLD BIOPSY AND HOT SNARE POLYPECTOMIES, INJECTED WITH NORMAL SALINE 3ML, INJECTED WITH SPOT INK 5ML, AND BIOPSIES;   Surgeon: Mushtaq Booth MD;  Location: [facility] ENDOSCOPY;  Service:    • CORONARY ARTERY BYPASS GRAFT  02/08/2011   • OTHER SURGICAL HISTORY      Laser Suite Ret Treatment Pan-Ablated Inferior Nasal Retina   • TONSILLECTOMY         Family History   Problem Relation Age of Onset   • Heart attack Father    • Hypertension Other        Social History     Social History   • Marital status:      Spouse name: N/A   • Number of children: N/A   • Years of education: N/A     Occupational History   • Not on file.     Social History Main Topics   • Smoking status: Former Smoker     Quit date: 3/8/1986   • Smokeless tobacco: Never Used   • Alcohol use Yes      Comment: occasionally   • Drug use: No   • Sexual activity: Not on file     Other Topics Concern   • Not on file     Social History Narrative         Physical Exam   Constitutional: He is oriented to person, place, and time. He appears well-developed and well-nourished.   HENT:   Head: Normocephalic.   Nose: Nose normal.   Eyes: Conjunctivae and EOM are normal.   Pupils are extremely small and equal bilaterally   Neck: Normal range of motion. Neck supple. No thyromegaly present.   Cardiovascular: Normal rate and regular rhythm.    Pulmonary/Chest: Effort normal and breath sounds normal.   Abdominal: Soft. He exhibits no distension.   Musculoskeletal: He exhibits no edema or deformity.   Neurological: He is alert and oriented to person, place, and time. He exhibits normal muscle tone. Coordination normal.   Skin: Skin is warm and dry.   Psychiatric: He has a normal mood and affect. His behavior is normal. Judgment and thought content normal.   Vitals reviewed.        Assessment/Plan    Eric was seen today for follow-up, hypertension, hyperlipidemia and diabetes.    Diagnoses and all orders for this visit:    Coronary arteriosclerosis in native artery    Hyperlipidemia, unspecified hyperlipidemia type  -     Lipid Panel    Essential hypertension  -     Basic Metabolic  Panel    Acquired hypothyroidism  -     TSH+Free T4    Controlled type 2 diabetes mellitus without complication, with long-term current use of insulin  -     insulin NPH-insulin regular (HUMULIN 70/30) (70-30) 100 UNIT/ML injection; 35 units QAM; 33 units QPM  -     Hemoglobin A1c    High risk medication use  -     CBC & Differential        Patient is here for routine follow-up especially of diabetes on insulin therapy.  He did have a hypoglycemic episode 3 months ago and insulin dosage was decreased slightly.  Denies any further episodes.  Most recent A1c level showed adequate control and will be rechecked especially after insulin dosage adjustment.  Also will recheck cholesterol level and thyroid panel with planned follow-up in 6 months or as needed.    This note includes information entered using a voice recognition dictation system.  Though reviewed, some nonsensible errors may remain.

## 2017-04-19 LAB
BASOPHILS # BLD AUTO: 0.03 10*3/MM3 (ref 0–0.2)
BASOPHILS NFR BLD AUTO: 0.3 % (ref 0–1.5)
BUN SERPL-MCNC: 19 MG/DL (ref 8–23)
BUN/CREAT SERPL: 20 (ref 7–25)
CALCIUM SERPL-MCNC: 9.2 MG/DL (ref 8.6–10.5)
CHLORIDE SERPL-SCNC: 100 MMOL/L (ref 98–107)
CHOLEST SERPL-MCNC: 162 MG/DL (ref 0–200)
CO2 SERPL-SCNC: 29.4 MMOL/L (ref 22–29)
CREAT SERPL-MCNC: 0.95 MG/DL (ref 0.76–1.27)
EOSINOPHIL # BLD AUTO: 0.2 10*3/MM3 (ref 0–0.7)
EOSINOPHIL NFR BLD AUTO: 2.2 % (ref 0.3–6.2)
ERYTHROCYTE [DISTWIDTH] IN BLOOD BY AUTOMATED COUNT: 14.5 % (ref 11.5–14.5)
GLUCOSE SERPL-MCNC: 102 MG/DL (ref 65–99)
HBA1C MFR BLD: 8.21 % (ref 4.8–5.6)
HCT VFR BLD AUTO: 40.8 % (ref 40.4–52.2)
HDLC SERPL-MCNC: 41 MG/DL (ref 40–60)
HGB BLD-MCNC: 13.1 G/DL (ref 13.7–17.6)
IMM GRANULOCYTES # BLD: 0 10*3/MM3 (ref 0–0.03)
IMM GRANULOCYTES NFR BLD: 0 % (ref 0–0.5)
INTERPRETATION: NORMAL
LDLC SERPL CALC-MCNC: 95 MG/DL (ref 0–100)
LYMPHOCYTES # BLD AUTO: 2.22 10*3/MM3 (ref 0.9–4.8)
LYMPHOCYTES NFR BLD AUTO: 24.8 % (ref 19.6–45.3)
Lab: NORMAL
MCH RBC QN AUTO: 27.5 PG (ref 27–32.7)
MCHC RBC AUTO-ENTMCNC: 32.1 G/DL (ref 32.6–36.4)
MCV RBC AUTO: 85.5 FL (ref 79.8–96.2)
MONOCYTES # BLD AUTO: 0.9 10*3/MM3 (ref 0.2–1.2)
MONOCYTES NFR BLD AUTO: 10.1 % (ref 5–12)
NEUTROPHILS # BLD AUTO: 5.59 10*3/MM3 (ref 1.9–8.1)
NEUTROPHILS NFR BLD AUTO: 62.6 % (ref 42.7–76)
PLATELET # BLD AUTO: 275 10*3/MM3 (ref 140–500)
POTASSIUM SERPL-SCNC: 4.1 MMOL/L (ref 3.5–5.2)
RBC # BLD AUTO: 4.77 10*6/MM3 (ref 4.6–6)
SODIUM SERPL-SCNC: 143 MMOL/L (ref 136–145)
T4 FREE SERPL-MCNC: 1.47 NG/DL (ref 0.93–1.7)
TRIGL SERPL-MCNC: 132 MG/DL (ref 0–150)
TSH SERPL DL<=0.005 MIU/L-ACNC: 4.51 MIU/ML (ref 0.27–4.2)
VLDLC SERPL CALC-MCNC: 26.4 MG/DL (ref 5–40)
WBC # BLD AUTO: 8.94 10*3/MM3 (ref 4.5–10.7)

## 2017-04-20 RX ORDER — LISINOPRIL 40 MG/1
TABLET ORAL
Qty: 60 TABLET | Refills: 4 | Status: SHIPPED | OUTPATIENT
Start: 2017-04-20 | End: 2017-05-30 | Stop reason: SDUPTHER

## 2017-04-20 RX ORDER — SYRINGE-NEEDLE,INSULIN,0.5 ML 31 GX5/16"
SYRINGE, EMPTY DISPOSABLE MISCELLANEOUS
Qty: 100 EACH | Refills: 0 | Status: SHIPPED | OUTPATIENT
Start: 2017-04-20 | End: 2017-05-30 | Stop reason: SDUPTHER

## 2017-04-25 ENCOUNTER — ANESTHESIA (OUTPATIENT)
Dept: GASTROENTEROLOGY | Facility: HOSPITAL | Age: 67
End: 2017-04-25

## 2017-04-25 ENCOUNTER — ANESTHESIA EVENT (OUTPATIENT)
Dept: GASTROENTEROLOGY | Facility: HOSPITAL | Age: 67
End: 2017-04-25

## 2017-04-25 ENCOUNTER — TELEPHONE (OUTPATIENT)
Dept: GASTROENTEROLOGY | Facility: CLINIC | Age: 67
End: 2017-04-25

## 2017-04-25 ENCOUNTER — HOSPITAL ENCOUNTER (OUTPATIENT)
Facility: HOSPITAL | Age: 67
Setting detail: HOSPITAL OUTPATIENT SURGERY
Discharge: HOME | End: 2017-04-25
Attending: INTERNAL MEDICINE | Admitting: INTERNAL MEDICINE

## 2017-04-25 VITALS
HEART RATE: 59 BPM | TEMPERATURE: 98.6 F | OXYGEN SATURATION: 96 % | RESPIRATION RATE: 12 BRPM | WEIGHT: 216 LBS | SYSTOLIC BLOOD PRESSURE: 145 MMHG | HEIGHT: 71 IN | BODY MASS INDEX: 30.24 KG/M2 | DIASTOLIC BLOOD PRESSURE: 68 MMHG

## 2017-04-25 DIAGNOSIS — K63.5 COLON POLYPS: ICD-10-CM

## 2017-04-25 DIAGNOSIS — K63.89 COLONIC MASS: Primary | ICD-10-CM

## 2017-04-25 LAB — GLUCOSE BLDC GLUCOMTR-MCNC: 217 MG/DL (ref 70–130)

## 2017-04-25 PROCEDURE — 45381 COLONOSCOPY SUBMUCOUS NJX: CPT | Performed by: INTERNAL MEDICINE

## 2017-04-25 PROCEDURE — 45380 COLONOSCOPY AND BIOPSY: CPT | Performed by: INTERNAL MEDICINE

## 2017-04-25 PROCEDURE — 82962 GLUCOSE BLOOD TEST: CPT

## 2017-04-25 PROCEDURE — 88305 TISSUE EXAM BY PATHOLOGIST: CPT | Performed by: INTERNAL MEDICINE

## 2017-04-25 PROCEDURE — 25010000002 PROPOFOL 10 MG/ML EMULSION: Performed by: ANESTHESIOLOGY

## 2017-04-25 RX ORDER — SODIUM CHLORIDE, SODIUM LACTATE, POTASSIUM CHLORIDE, CALCIUM CHLORIDE 600; 310; 30; 20 MG/100ML; MG/100ML; MG/100ML; MG/100ML
1000 INJECTION, SOLUTION INTRAVENOUS CONTINUOUS PRN
Status: DISCONTINUED | OUTPATIENT
Start: 2017-04-25 | End: 2017-04-25 | Stop reason: HOSPADM

## 2017-04-25 RX ORDER — ONDANSETRON 2 MG/ML
4 INJECTION INTRAMUSCULAR; INTRAVENOUS ONCE AS NEEDED
Status: DISCONTINUED | OUTPATIENT
Start: 2017-04-25 | End: 2017-04-25 | Stop reason: HOSPADM

## 2017-04-25 RX ORDER — PROPOFOL 10 MG/ML
VIAL (ML) INTRAVENOUS AS NEEDED
Status: DISCONTINUED | OUTPATIENT
Start: 2017-04-25 | End: 2017-04-25 | Stop reason: SURG

## 2017-04-25 RX ORDER — LIDOCAINE HYDROCHLORIDE 20 MG/ML
INJECTION, SOLUTION INFILTRATION; PERINEURAL AS NEEDED
Status: DISCONTINUED | OUTPATIENT
Start: 2017-04-25 | End: 2017-04-25 | Stop reason: SURG

## 2017-04-25 RX ADMIN — PROPOFOL 200 MG: 10 INJECTION, EMULSION INTRAVENOUS at 10:20

## 2017-04-25 RX ADMIN — PROPOFOL 200 MG: 10 INJECTION, EMULSION INTRAVENOUS at 10:13

## 2017-04-25 RX ADMIN — LIDOCAINE HYDROCHLORIDE 100 MG: 20 INJECTION, SOLUTION INFILTRATION; PERINEURAL at 10:13

## 2017-04-25 RX ADMIN — SODIUM CHLORIDE, POTASSIUM CHLORIDE, SODIUM LACTATE AND CALCIUM CHLORIDE 1000 ML: 600; 310; 30; 20 INJECTION, SOLUTION INTRAVENOUS at 09:59

## 2017-04-25 NOTE — ANESTHESIA POSTPROCEDURE EVALUATION
Patient: Eric ADAMSON Noe    Procedure Summary     Date Anesthesia Start Anesthesia Stop Room / Location    04/25/17 1012 1044  [facility] /  [facility]       Procedure Diagnosis Surgeon Provider    COLONOSCOPY to cecum and terminal ileum with cold polypectomy, and biopsies and tattoo (5cc) to mass at 75cm  (N/A ) Colon polyps  (Colon polyps [K63.5]) MD Mana Santamaria MD          Anesthesia Type: MAC  Last vitals  /68 (04/25/17 1107)    Temp      Pulse 59 (04/25/17 1107)   Resp 12 (04/25/17 1107)    SpO2 96 % (04/25/17 1107)      Post Anesthesia Care and Evaluation    Patient location during evaluation: PACU  Patient participation: complete - patient participated  Level of consciousness: awake and alert  Pain score: 0  Pain management: adequate  Airway patency: patent  Anesthetic complications: No anesthetic complications  PONV Status: none  Cardiovascular status: acceptable  Respiratory status: acceptable  Hydration status: acceptable

## 2017-04-25 NOTE — TELEPHONE ENCOUNTER
"Message received that order needs to be placed to Dr Pollo Liao for partial colectomy 2nd colon mass.  Order placed in EPIC as \"verbal with readback\".  Message to Dr Booth.  "

## 2017-04-25 NOTE — ANESTHESIA PREPROCEDURE EVALUATION
Anesthesia Evaluation     Patient summary reviewed and Nursing notes reviewed   NPO Status: > 2 hours   Airway   Mallampati: II  TM distance: >3 FB  Neck ROM: full  no difficulty expected  Dental    (+) lower dentures and upper dentures    Pulmonary - normal exam   Cardiovascular - normal exam    ECG reviewed  Beta blocker given within 24 hours of surgery    (+) hypertension well controlled, past MI  >12 months, CAD, CABG,       Neuro/Psych  GI/Hepatic/Renal/Endo    (+)  diabetes mellitus type 2 well controlled using insulin,     Musculoskeletal     Abdominal  - normal exam    Bowel sounds: normal.   Substance History      OB/GYN          Other                                    Anesthesia Plan    ASA 3     MAC     Anesthetic plan and risks discussed with patient.

## 2017-04-26 LAB
CYTO UR: NORMAL
LAB AP CASE REPORT: NORMAL
Lab: NORMAL
PATH REPORT.FINAL DX SPEC: NORMAL
PATH REPORT.GROSS SPEC: NORMAL

## 2017-05-02 ENCOUNTER — OFFICE VISIT (OUTPATIENT)
Dept: SURGERY | Facility: CLINIC | Age: 67
End: 2017-05-02

## 2017-05-02 VITALS — BODY MASS INDEX: 31.14 KG/M2 | HEIGHT: 71 IN | WEIGHT: 222.4 LBS

## 2017-05-02 DIAGNOSIS — K63.89 MASS OF HEPATIC FLEXURE OF COLON: Primary | ICD-10-CM

## 2017-05-02 PROCEDURE — 99203 OFFICE O/P NEW LOW 30 MIN: CPT | Performed by: SURGERY

## 2017-05-02 RX ORDER — CEFAZOLIN SODIUM 2 G/100ML
2 INJECTION, SOLUTION INTRAVENOUS ONCE
Status: CANCELLED | OUTPATIENT
Start: 2017-05-02 | End: 2017-05-02

## 2017-05-02 RX ORDER — ALVIMOPAN 12 MG/1
12 CAPSULE ORAL ONCE
Status: CANCELLED | OUTPATIENT
Start: 2017-05-02 | End: 2017-05-02

## 2017-05-03 ENCOUNTER — TELEPHONE (OUTPATIENT)
Dept: GASTROENTEROLOGY | Facility: CLINIC | Age: 67
End: 2017-05-03

## 2017-05-04 ENCOUNTER — TELEPHONE (OUTPATIENT)
Dept: GASTROENTEROLOGY | Facility: CLINIC | Age: 67
End: 2017-05-04

## 2017-05-25 RX ORDER — CARVEDILOL 25 MG/1
TABLET ORAL
Qty: 180 TABLET | Refills: 0 | Status: SHIPPED | OUTPATIENT
Start: 2017-05-25 | End: 2017-05-30 | Stop reason: SDUPTHER

## 2017-05-30 ENCOUNTER — APPOINTMENT (OUTPATIENT)
Dept: PREADMISSION TESTING | Facility: HOSPITAL | Age: 67
End: 2017-05-30

## 2017-05-30 VITALS
HEIGHT: 71 IN | RESPIRATION RATE: 16 BRPM | TEMPERATURE: 98 F | SYSTOLIC BLOOD PRESSURE: 154 MMHG | HEART RATE: 71 BPM | WEIGHT: 221 LBS | DIASTOLIC BLOOD PRESSURE: 64 MMHG | OXYGEN SATURATION: 95 % | BODY MASS INDEX: 30.94 KG/M2

## 2017-05-30 LAB
ANION GAP SERPL CALCULATED.3IONS-SCNC: 16.3 MMOL/L
BUN BLD-MCNC: 14 MG/DL (ref 8–23)
BUN/CREAT SERPL: 14.6 (ref 7–25)
CALCIUM SPEC-SCNC: 8.4 MG/DL (ref 8.6–10.5)
CHLORIDE SERPL-SCNC: 99 MMOL/L (ref 98–107)
CO2 SERPL-SCNC: 25.7 MMOL/L (ref 22–29)
CREAT BLD-MCNC: 0.96 MG/DL (ref 0.76–1.27)
DEPRECATED RDW RBC AUTO: 43.3 FL (ref 37–54)
ERYTHROCYTE [DISTWIDTH] IN BLOOD BY AUTOMATED COUNT: 14.1 % (ref 11.5–14.5)
GFR SERPL CREATININE-BSD FRML MDRD: 78 ML/MIN/1.73
GLUCOSE BLD-MCNC: 285 MG/DL (ref 65–99)
HCT VFR BLD AUTO: 40.8 % (ref 40.4–52.2)
HGB BLD-MCNC: 13.5 G/DL (ref 13.7–17.6)
MCH RBC QN AUTO: 27.8 PG (ref 27–32.7)
MCHC RBC AUTO-ENTMCNC: 33.1 G/DL (ref 32.6–36.4)
MCV RBC AUTO: 84.1 FL (ref 79.8–96.2)
PLATELET # BLD AUTO: 242 10*3/MM3 (ref 140–500)
PMV BLD AUTO: 11.2 FL (ref 6–12)
POTASSIUM BLD-SCNC: 4.1 MMOL/L (ref 3.5–5.2)
RBC # BLD AUTO: 4.85 10*6/MM3 (ref 4.6–6)
SODIUM BLD-SCNC: 141 MMOL/L (ref 136–145)
WBC NRBC COR # BLD: 8.12 10*3/MM3 (ref 4.5–10.7)

## 2017-05-30 RX ORDER — AMLODIPINE BESYLATE 5 MG/1
5 TABLET ORAL 2 TIMES DAILY
COMMUNITY
End: 2017-07-05 | Stop reason: SDUPTHER

## 2017-05-30 RX ORDER — LEVOTHYROXINE SODIUM 88 UG/1
88 TABLET ORAL DAILY
COMMUNITY
End: 2017-10-12 | Stop reason: SDUPTHER

## 2017-05-30 RX ORDER — CARVEDILOL 25 MG/1
25 TABLET ORAL 2 TIMES DAILY
COMMUNITY
End: 2017-09-19 | Stop reason: SDUPTHER

## 2017-05-30 RX ORDER — ATORVASTATIN CALCIUM 80 MG/1
40 TABLET, FILM COATED ORAL DAILY
COMMUNITY
End: 2017-09-19 | Stop reason: SDUPTHER

## 2017-05-30 RX ORDER — LISINOPRIL 40 MG/1
40 TABLET ORAL 2 TIMES DAILY
COMMUNITY
End: 2017-09-20 | Stop reason: SDUPTHER

## 2017-05-31 RX ORDER — CARVEDILOL 25 MG/1
TABLET ORAL
Qty: 180 TABLET | Refills: 0 | Status: ON HOLD | OUTPATIENT
Start: 2017-05-31 | End: 2017-06-01 | Stop reason: SDUPTHER

## 2017-06-01 ENCOUNTER — ANESTHESIA EVENT (OUTPATIENT)
Dept: PERIOP | Facility: HOSPITAL | Age: 67
End: 2017-06-01

## 2017-06-01 ENCOUNTER — HOSPITAL ENCOUNTER (INPATIENT)
Facility: HOSPITAL | Age: 67
LOS: 3 days | Discharge: HOME | End: 2017-06-04
Attending: SURGERY | Admitting: SURGERY

## 2017-06-01 ENCOUNTER — ANESTHESIA (OUTPATIENT)
Dept: PERIOP | Facility: HOSPITAL | Age: 67
End: 2017-06-01

## 2017-06-01 DIAGNOSIS — K63.89 MASS OF HEPATIC FLEXURE OF COLON: ICD-10-CM

## 2017-06-01 LAB
GLUCOSE BLDC GLUCOMTR-MCNC: 122 MG/DL (ref 70–130)
GLUCOSE BLDC GLUCOMTR-MCNC: 239 MG/DL (ref 70–130)
GLUCOSE BLDC GLUCOMTR-MCNC: 240 MG/DL (ref 70–130)
GLUCOSE BLDC GLUCOMTR-MCNC: 335 MG/DL (ref 70–130)

## 2017-06-01 PROCEDURE — 25010000003 CEFAZOLIN IN DEXTROSE 2-4 GM/100ML-% SOLUTION: Performed by: SURGERY

## 2017-06-01 PROCEDURE — 82962 GLUCOSE BLOOD TEST: CPT

## 2017-06-01 PROCEDURE — 25010000002 HYDROMORPHONE PER 4 MG: Performed by: ANESTHESIOLOGY

## 2017-06-01 PROCEDURE — 25010000002 NEOSTIGMINE 10 MG/10ML SOLUTION: Performed by: ANESTHESIOLOGY

## 2017-06-01 PROCEDURE — 25010000002 KETOROLAC TROMETHAMINE PER 15 MG: Performed by: ANESTHESIOLOGY

## 2017-06-01 PROCEDURE — 25010000002 MIDAZOLAM PER 1 MG: Performed by: ANESTHESIOLOGY

## 2017-06-01 PROCEDURE — 25010000002 FENTANYL CITRATE (PF) 100 MCG/2ML SOLUTION: Performed by: ANESTHESIOLOGY

## 2017-06-01 PROCEDURE — 25010000002 PHENYLEPHRINE PER 1 ML: Performed by: ANESTHESIOLOGY

## 2017-06-01 PROCEDURE — 63710000001 INSULIN ASPART PER 5 UNITS: Performed by: SURGERY

## 2017-06-01 PROCEDURE — 25010000002 PROPOFOL 10 MG/ML EMULSION: Performed by: ANESTHESIOLOGY

## 2017-06-01 PROCEDURE — 25010000002 PROMETHAZINE PER 50 MG: Performed by: ANESTHESIOLOGY

## 2017-06-01 PROCEDURE — 25010000002 ONDANSETRON PER 1 MG: Performed by: ANESTHESIOLOGY

## 2017-06-01 PROCEDURE — 0DTF4ZZ RESECTION OF RIGHT LARGE INTESTINE, PERCUTANEOUS ENDOSCOPIC APPROACH: ICD-10-PCS | Performed by: SURGERY

## 2017-06-01 PROCEDURE — 25010000002 HYDROMORPHONE PER 4 MG: Performed by: SURGERY

## 2017-06-01 PROCEDURE — 88307 TISSUE EXAM BY PATHOLOGIST: CPT | Performed by: SURGERY

## 2017-06-01 PROCEDURE — 88309 TISSUE EXAM BY PATHOLOGIST: CPT | Performed by: SURGERY

## 2017-06-01 PROCEDURE — 44205 LAP COLECTOMY PART W/ILEUM: CPT | Performed by: SURGERY

## 2017-06-01 PROCEDURE — 44205 LAP COLECTOMY PART W/ILEUM: CPT | Performed by: PHYSICIAN ASSISTANT

## 2017-06-01 RX ORDER — LISINOPRIL 40 MG/1
40 TABLET ORAL 2 TIMES DAILY
Status: DISCONTINUED | OUTPATIENT
Start: 2017-06-01 | End: 2017-06-04 | Stop reason: HOSPADM

## 2017-06-01 RX ORDER — NALOXONE HCL 0.4 MG/ML
0.2 VIAL (ML) INJECTION AS NEEDED
Status: DISCONTINUED | OUTPATIENT
Start: 2017-06-01 | End: 2017-06-01 | Stop reason: HOSPADM

## 2017-06-01 RX ORDER — HYDROMORPHONE HYDROCHLORIDE 1 MG/ML
0.5 INJECTION, SOLUTION INTRAMUSCULAR; INTRAVENOUS; SUBCUTANEOUS
Status: DISCONTINUED | OUTPATIENT
Start: 2017-06-01 | End: 2017-06-01 | Stop reason: HOSPADM

## 2017-06-01 RX ORDER — NICOTINE POLACRILEX 4 MG
15 LOZENGE BUCCAL
Status: DISCONTINUED | OUTPATIENT
Start: 2017-06-01 | End: 2017-06-04 | Stop reason: HOSPADM

## 2017-06-01 RX ORDER — ASPIRIN 325 MG
325 TABLET ORAL DAILY
Status: DISCONTINUED | OUTPATIENT
Start: 2017-06-01 | End: 2017-06-04 | Stop reason: HOSPADM

## 2017-06-01 RX ORDER — BUPIVACAINE HYDROCHLORIDE AND EPINEPHRINE 5; 5 MG/ML; UG/ML
INJECTION, SOLUTION PERINEURAL AS NEEDED
Status: DISCONTINUED | OUTPATIENT
Start: 2017-06-01 | End: 2017-06-01 | Stop reason: HOSPADM

## 2017-06-01 RX ORDER — MIDAZOLAM HYDROCHLORIDE 1 MG/ML
2 INJECTION INTRAMUSCULAR; INTRAVENOUS
Status: DISCONTINUED | OUTPATIENT
Start: 2017-06-01 | End: 2017-06-01 | Stop reason: HOSPADM

## 2017-06-01 RX ORDER — FENTANYL CITRATE 50 UG/ML
50 INJECTION, SOLUTION INTRAMUSCULAR; INTRAVENOUS
Status: DISCONTINUED | OUTPATIENT
Start: 2017-06-01 | End: 2017-06-01 | Stop reason: HOSPADM

## 2017-06-01 RX ORDER — ONDANSETRON 2 MG/ML
INJECTION INTRAMUSCULAR; INTRAVENOUS AS NEEDED
Status: DISCONTINUED | OUTPATIENT
Start: 2017-06-01 | End: 2017-06-01 | Stop reason: SURG

## 2017-06-01 RX ORDER — OXYCODONE AND ACETAMINOPHEN 10; 325 MG/1; MG/1
1 TABLET ORAL EVERY 4 HOURS PRN
Status: DISCONTINUED | OUTPATIENT
Start: 2017-06-01 | End: 2017-06-04 | Stop reason: HOSPADM

## 2017-06-01 RX ORDER — FAMOTIDINE 10 MG/ML
20 INJECTION, SOLUTION INTRAVENOUS ONCE
Status: COMPLETED | OUTPATIENT
Start: 2017-06-01 | End: 2017-06-01

## 2017-06-01 RX ORDER — DEXTROSE MONOHYDRATE 25 G/50ML
25 INJECTION, SOLUTION INTRAVENOUS
Status: DISCONTINUED | OUTPATIENT
Start: 2017-06-01 | End: 2017-06-04 | Stop reason: HOSPADM

## 2017-06-01 RX ORDER — HYDROCODONE BITARTRATE AND ACETAMINOPHEN 7.5; 325 MG/1; MG/1
1 TABLET ORAL ONCE AS NEEDED
Status: DISCONTINUED | OUTPATIENT
Start: 2017-06-01 | End: 2017-06-01 | Stop reason: HOSPADM

## 2017-06-01 RX ORDER — MAGNESIUM HYDROXIDE 1200 MG/15ML
LIQUID ORAL AS NEEDED
Status: DISCONTINUED | OUTPATIENT
Start: 2017-06-01 | End: 2017-06-01 | Stop reason: HOSPADM

## 2017-06-01 RX ORDER — LABETALOL HYDROCHLORIDE 5 MG/ML
5 INJECTION, SOLUTION INTRAVENOUS
Status: DISCONTINUED | OUTPATIENT
Start: 2017-06-01 | End: 2017-06-01 | Stop reason: HOSPADM

## 2017-06-01 RX ORDER — PROMETHAZINE HYDROCHLORIDE 25 MG/ML
12.5 INJECTION, SOLUTION INTRAMUSCULAR; INTRAVENOUS ONCE AS NEEDED
Status: COMPLETED | OUTPATIENT
Start: 2017-06-01 | End: 2017-06-01

## 2017-06-01 RX ORDER — SODIUM CHLORIDE, SODIUM LACTATE, POTASSIUM CHLORIDE, CALCIUM CHLORIDE 600; 310; 30; 20 MG/100ML; MG/100ML; MG/100ML; MG/100ML
9 INJECTION, SOLUTION INTRAVENOUS CONTINUOUS
Status: DISCONTINUED | OUTPATIENT
Start: 2017-06-01 | End: 2017-06-01

## 2017-06-01 RX ORDER — SODIUM CHLORIDE 450 MG/100ML
75 INJECTION, SOLUTION INTRAVENOUS CONTINUOUS
Status: DISCONTINUED | OUTPATIENT
Start: 2017-06-01 | End: 2017-06-03

## 2017-06-01 RX ORDER — MIDAZOLAM HYDROCHLORIDE 1 MG/ML
1 INJECTION INTRAMUSCULAR; INTRAVENOUS
Status: DISCONTINUED | OUTPATIENT
Start: 2017-06-01 | End: 2017-06-01 | Stop reason: HOSPADM

## 2017-06-01 RX ORDER — ATORVASTATIN CALCIUM 20 MG/1
40 TABLET, FILM COATED ORAL DAILY
Status: DISCONTINUED | OUTPATIENT
Start: 2017-06-01 | End: 2017-06-04 | Stop reason: HOSPADM

## 2017-06-01 RX ORDER — PROPOFOL 10 MG/ML
VIAL (ML) INTRAVENOUS AS NEEDED
Status: DISCONTINUED | OUTPATIENT
Start: 2017-06-01 | End: 2017-06-01 | Stop reason: SURG

## 2017-06-01 RX ORDER — KETOROLAC TROMETHAMINE 30 MG/ML
INJECTION, SOLUTION INTRAMUSCULAR; INTRAVENOUS AS NEEDED
Status: DISCONTINUED | OUTPATIENT
Start: 2017-06-01 | End: 2017-06-01 | Stop reason: SURG

## 2017-06-01 RX ORDER — HYDROMORPHONE HYDROCHLORIDE 1 MG/ML
0.5 INJECTION, SOLUTION INTRAMUSCULAR; INTRAVENOUS; SUBCUTANEOUS
Status: DISCONTINUED | OUTPATIENT
Start: 2017-06-01 | End: 2017-06-04 | Stop reason: HOSPADM

## 2017-06-01 RX ORDER — CEFAZOLIN SODIUM 2 G/100ML
2 INJECTION, SOLUTION INTRAVENOUS ONCE
Status: COMPLETED | OUTPATIENT
Start: 2017-06-01 | End: 2017-06-01

## 2017-06-01 RX ORDER — SODIUM CHLORIDE 0.9 % (FLUSH) 0.9 %
1-10 SYRINGE (ML) INJECTION AS NEEDED
Status: DISCONTINUED | OUTPATIENT
Start: 2017-06-01 | End: 2017-06-01 | Stop reason: HOSPADM

## 2017-06-01 RX ORDER — HYDROMORPHONE HCL 110MG/55ML
PATIENT CONTROLLED ANALGESIA SYRINGE INTRAVENOUS AS NEEDED
Status: DISCONTINUED | OUTPATIENT
Start: 2017-06-01 | End: 2017-06-01 | Stop reason: SURG

## 2017-06-01 RX ORDER — FENTANYL CITRATE 50 UG/ML
INJECTION, SOLUTION INTRAMUSCULAR; INTRAVENOUS AS NEEDED
Status: DISCONTINUED | OUTPATIENT
Start: 2017-06-01 | End: 2017-06-01 | Stop reason: SURG

## 2017-06-01 RX ORDER — PROMETHAZINE HYDROCHLORIDE 25 MG/1
25 SUPPOSITORY RECTAL ONCE AS NEEDED
Status: COMPLETED | OUTPATIENT
Start: 2017-06-01 | End: 2017-06-01

## 2017-06-01 RX ORDER — PANTOPRAZOLE SODIUM 40 MG/1
40 TABLET, DELAYED RELEASE ORAL
Status: DISCONTINUED | OUTPATIENT
Start: 2017-06-02 | End: 2017-06-04 | Stop reason: HOSPADM

## 2017-06-01 RX ORDER — SODIUM CHLORIDE 9 MG/ML
INJECTION, SOLUTION INTRAVENOUS CONTINUOUS PRN
Status: DISCONTINUED | OUTPATIENT
Start: 2017-06-01 | End: 2017-06-01 | Stop reason: HOSPADM

## 2017-06-01 RX ORDER — AMLODIPINE BESYLATE 5 MG/1
5 TABLET ORAL 2 TIMES DAILY
Status: DISCONTINUED | OUTPATIENT
Start: 2017-06-01 | End: 2017-06-04 | Stop reason: HOSPADM

## 2017-06-01 RX ORDER — ALVIMOPAN 12 MG/1
12 CAPSULE ORAL 2 TIMES DAILY
Status: DISCONTINUED | OUTPATIENT
Start: 2017-06-01 | End: 2017-06-03

## 2017-06-01 RX ORDER — CARVEDILOL 25 MG/1
25 TABLET ORAL 2 TIMES DAILY
Status: DISCONTINUED | OUTPATIENT
Start: 2017-06-01 | End: 2017-06-04 | Stop reason: HOSPADM

## 2017-06-01 RX ORDER — OXYCODONE AND ACETAMINOPHEN 7.5; 325 MG/1; MG/1
1 TABLET ORAL ONCE AS NEEDED
Status: DISCONTINUED | OUTPATIENT
Start: 2017-06-01 | End: 2017-06-01 | Stop reason: HOSPADM

## 2017-06-01 RX ORDER — PROMETHAZINE HYDROCHLORIDE 25 MG/1
12.5 TABLET ORAL ONCE AS NEEDED
Status: DISCONTINUED | OUTPATIENT
Start: 2017-06-01 | End: 2017-06-01 | Stop reason: HOSPADM

## 2017-06-01 RX ORDER — LEVOTHYROXINE SODIUM 88 UG/1
88 TABLET ORAL DAILY
Status: DISCONTINUED | OUTPATIENT
Start: 2017-06-01 | End: 2017-06-04 | Stop reason: HOSPADM

## 2017-06-01 RX ORDER — HYDRALAZINE HYDROCHLORIDE 20 MG/ML
5 INJECTION INTRAMUSCULAR; INTRAVENOUS
Status: DISCONTINUED | OUTPATIENT
Start: 2017-06-01 | End: 2017-06-01 | Stop reason: HOSPADM

## 2017-06-01 RX ORDER — NALOXONE HCL 0.4 MG/ML
0.1 VIAL (ML) INJECTION
Status: DISCONTINUED | OUTPATIENT
Start: 2017-06-01 | End: 2017-06-04 | Stop reason: HOSPADM

## 2017-06-01 RX ORDER — PROMETHAZINE HYDROCHLORIDE 25 MG/1
25 TABLET ORAL ONCE AS NEEDED
Status: COMPLETED | OUTPATIENT
Start: 2017-06-01 | End: 2017-06-01

## 2017-06-01 RX ORDER — NEOSTIGMINE METHYLSULFATE 1 MG/ML
INJECTION, SOLUTION INTRAVENOUS AS NEEDED
Status: DISCONTINUED | OUTPATIENT
Start: 2017-06-01 | End: 2017-06-01 | Stop reason: SURG

## 2017-06-01 RX ORDER — ONDANSETRON 2 MG/ML
4 INJECTION INTRAMUSCULAR; INTRAVENOUS ONCE AS NEEDED
Status: DISCONTINUED | OUTPATIENT
Start: 2017-06-01 | End: 2017-06-01 | Stop reason: HOSPADM

## 2017-06-01 RX ORDER — ALVIMOPAN 12 MG/1
12 CAPSULE ORAL ONCE
Status: COMPLETED | OUTPATIENT
Start: 2017-06-01 | End: 2017-06-01

## 2017-06-01 RX ORDER — DIPHENHYDRAMINE HYDROCHLORIDE 50 MG/ML
12.5 INJECTION INTRAMUSCULAR; INTRAVENOUS
Status: DISCONTINUED | OUTPATIENT
Start: 2017-06-01 | End: 2017-06-01 | Stop reason: HOSPADM

## 2017-06-01 RX ORDER — GLYCOPYRROLATE 0.2 MG/ML
INJECTION INTRAMUSCULAR; INTRAVENOUS AS NEEDED
Status: DISCONTINUED | OUTPATIENT
Start: 2017-06-01 | End: 2017-06-01 | Stop reason: SURG

## 2017-06-01 RX ORDER — ROCURONIUM BROMIDE 10 MG/ML
INJECTION, SOLUTION INTRAVENOUS AS NEEDED
Status: DISCONTINUED | OUTPATIENT
Start: 2017-06-01 | End: 2017-06-01 | Stop reason: SURG

## 2017-06-01 RX ORDER — LIDOCAINE HYDROCHLORIDE 20 MG/ML
INJECTION, SOLUTION INFILTRATION; PERINEURAL AS NEEDED
Status: DISCONTINUED | OUTPATIENT
Start: 2017-06-01 | End: 2017-06-01 | Stop reason: SURG

## 2017-06-01 RX ORDER — FLUMAZENIL 0.1 MG/ML
0.2 INJECTION INTRAVENOUS AS NEEDED
Status: DISCONTINUED | OUTPATIENT
Start: 2017-06-01 | End: 2017-06-01 | Stop reason: HOSPADM

## 2017-06-01 RX ADMIN — PROMETHAZINE HYDROCHLORIDE 12.5 MG: 25 INJECTION INTRAMUSCULAR; INTRAVENOUS at 14:15

## 2017-06-01 RX ADMIN — SODIUM CHLORIDE, POTASSIUM CHLORIDE, SODIUM LACTATE AND CALCIUM CHLORIDE: 600; 310; 30; 20 INJECTION, SOLUTION INTRAVENOUS at 09:45

## 2017-06-01 RX ADMIN — PHENYLEPHRINE HYDROCHLORIDE 100 MCG: 10 INJECTION INTRAVENOUS at 09:24

## 2017-06-01 RX ADMIN — INSULIN ASPART 5 UNITS: 100 INJECTION, SOLUTION INTRAVENOUS; SUBCUTANEOUS at 17:26

## 2017-06-01 RX ADMIN — LIDOCAINE HYDROCHLORIDE 60 MG: 20 INJECTION, SOLUTION INFILTRATION; PERINEURAL at 09:10

## 2017-06-01 RX ADMIN — HYDROMORPHONE HYDROCHLORIDE 0.5 MG: 2 INJECTION, SOLUTION INTRAMUSCULAR; INTRAVENOUS; SUBCUTANEOUS at 11:33

## 2017-06-01 RX ADMIN — PROPOFOL 200 MG: 10 INJECTION, EMULSION INTRAVENOUS at 09:10

## 2017-06-01 RX ADMIN — CEFAZOLIN SODIUM 2 G: 2 INJECTION, SOLUTION INTRAVENOUS at 09:14

## 2017-06-01 RX ADMIN — FENTANYL CITRATE 50 MCG: 50 INJECTION, SOLUTION INTRAMUSCULAR; INTRAVENOUS at 10:32

## 2017-06-01 RX ADMIN — FENTANYL CITRATE 50 MCG: 50 INJECTION, SOLUTION INTRAMUSCULAR; INTRAVENOUS at 11:13

## 2017-06-01 RX ADMIN — HYDROMORPHONE HYDROCHLORIDE 0.5 MG: 2 INJECTION, SOLUTION INTRAMUSCULAR; INTRAVENOUS; SUBCUTANEOUS at 10:39

## 2017-06-01 RX ADMIN — LISINOPRIL 40 MG: 40 TABLET ORAL at 19:06

## 2017-06-01 RX ADMIN — FENTANYL CITRATE 50 MCG: 50 INJECTION, SOLUTION INTRAMUSCULAR; INTRAVENOUS at 09:50

## 2017-06-01 RX ADMIN — FENTANYL CITRATE 50 MCG: 50 INJECTION, SOLUTION INTRAMUSCULAR; INTRAVENOUS at 11:19

## 2017-06-01 RX ADMIN — SODIUM CHLORIDE, POTASSIUM CHLORIDE, SODIUM LACTATE AND CALCIUM CHLORIDE: 600; 310; 30; 20 INJECTION, SOLUTION INTRAVENOUS at 12:09

## 2017-06-01 RX ADMIN — GLYCOPYRROLATE 0.3 MG: 0.2 INJECTION INTRAMUSCULAR; INTRAVENOUS at 12:12

## 2017-06-01 RX ADMIN — PHENYLEPHRINE HYDROCHLORIDE 100 MCG: 10 INJECTION INTRAVENOUS at 09:41

## 2017-06-01 RX ADMIN — KETOROLAC TROMETHAMINE 30 MG: 30 INJECTION, SOLUTION INTRAMUSCULAR; INTRAVENOUS at 11:46

## 2017-06-01 RX ADMIN — ASPIRIN 325 MG: 325 TABLET ORAL at 19:03

## 2017-06-01 RX ADMIN — SODIUM CHLORIDE, POTASSIUM CHLORIDE, SODIUM LACTATE AND CALCIUM CHLORIDE 9 ML/HR: 600; 310; 30; 20 INJECTION, SOLUTION INTRAVENOUS at 08:52

## 2017-06-01 RX ADMIN — HYDROMORPHONE HYDROCHLORIDE 0.5 MG: 1 INJECTION, SOLUTION INTRAMUSCULAR; INTRAVENOUS; SUBCUTANEOUS at 18:06

## 2017-06-01 RX ADMIN — ROCURONIUM BROMIDE 10 MG: 10 INJECTION INTRAVENOUS at 11:08

## 2017-06-01 RX ADMIN — MIDAZOLAM 2 MG: 1 INJECTION INTRAMUSCULAR; INTRAVENOUS at 08:52

## 2017-06-01 RX ADMIN — HYDROMORPHONE HYDROCHLORIDE 0.5 MG: 2 INJECTION, SOLUTION INTRAMUSCULAR; INTRAVENOUS; SUBCUTANEOUS at 11:52

## 2017-06-01 RX ADMIN — ONDANSETRON 4 MG: 2 INJECTION INTRAMUSCULAR; INTRAVENOUS at 12:00

## 2017-06-01 RX ADMIN — INSULIN ASPART 10 UNITS: 100 INJECTION, SOLUTION INTRAVENOUS; SUBCUTANEOUS at 21:47

## 2017-06-01 RX ADMIN — ALVIMOPAN 12 MG: 12 CAPSULE ORAL at 07:47

## 2017-06-01 RX ADMIN — FENTANYL CITRATE 50 MCG: 50 INJECTION, SOLUTION INTRAMUSCULAR; INTRAVENOUS at 09:08

## 2017-06-01 RX ADMIN — ALVIMOPAN 12 MG: 12 CAPSULE ORAL at 18:51

## 2017-06-01 RX ADMIN — FAMOTIDINE 20 MG: 10 INJECTION, SOLUTION INTRAVENOUS at 08:51

## 2017-06-01 RX ADMIN — SODIUM CHLORIDE 100 ML/HR: 4.5 INJECTION, SOLUTION INTRAVENOUS at 15:00

## 2017-06-01 RX ADMIN — HYDROMORPHONE HYDROCHLORIDE 0.5 MG: 2 INJECTION, SOLUTION INTRAMUSCULAR; INTRAVENOUS; SUBCUTANEOUS at 12:04

## 2017-06-01 RX ADMIN — NEOSTIGMINE METHYLSULFATE 3.5 MG: 1 INJECTION INTRAVENOUS at 12:12

## 2017-06-01 RX ADMIN — AMLODIPINE BESYLATE 5 MG: 5 TABLET ORAL at 19:04

## 2017-06-01 RX ADMIN — CARVEDILOL 25 MG: 25 TABLET, FILM COATED ORAL at 19:04

## 2017-06-01 RX ADMIN — ROCURONIUM BROMIDE 50 MG: 10 INJECTION INTRAVENOUS at 09:10

## 2017-06-01 NOTE — OP NOTE
PREOPERATIVE DIAGNOSIS:   1) Endoscopically unresectable polyp at the hepatic flexure.    POSTOPERATIVE DIAGNOSIS:   1) Endoscopically unresectable polyp at the hepatic flexure.    PROCEDURE:   1) Laparoscopic right colon resection with ileocolostomy.    SURGEON: Pollo Liao M.D.  ASSISTANT: Frannie De Souza PA-C.  ANESTHESIA: General.    BLOOD LOSS: 100 mL.  COUNTS: Needle and sponge count correct.  SPECIMENS: Right colon.    INDICATIONS FOR OPERATION: Eric Liu is a 66 y.o. male who has an endoscopically unresectable polyp at the hepatic flexure. It has been marked during his last colonoscopy by Dr Booth.     OPERATION: The patient was brought to the operating room in stable condition. Perioperative antibiotics were given. Sequential compression devices were in place. The patient was positioned supine on the operating room table. The legs were split. General anesthesia was induced without difficulty. The patient's abdomen was prepped and draped in the usual sterile fashion.   The skin in the left lower quadrant was anesthetized with 0.5% Marcaine with epinephrine. A 12 mm Opti-view trocar was used to gain entry into the peritoneum under laparoscopic vision.  A brief survey of the abdominal cavity revealed no intra-abdominal injury. There were no inguinal hernias. The liver appeared normal. The visualized portion of small intestine appeared normal. There were no peritoneal implants.  Two additional trocars were placed, a 12 mm port in the midline epigastric position, and a 5 mm port in the infraumbilical midline.   The line of Toldt was opened along the ascending colon starting at the pelvic brim. I could see the ureter in the retroperitoneum.  I medially rotated the hepatic flexure off the duodenum. The inked location at the hepatic flexure was seen. The omentum was dissected off the transverse colon with the Harmonic scalpel. I continued the omental dissection and the medial rotation of the  hepatic flexure until I was able to see the middle colic artery.  I divided the terminal ileum about 5 cm proximal to the ileocecal valve with a single firing of the linear cutting stapler using the intestinal cartridge.  The mesentery of the small bowel and terminal ileum was divided with the Harmonic scalpel down to the right colic artery.  The right colic artery was divided with a single firing of the linear cutting stapler using a vascular cartridge.  A lengthened the 12 mm upper midline trocar site to about 10 cm.  The subcutaneous tissues were  with electrocautery.  The midline fascia and peritoneum were opened.  Sponges moistened with bacitracin solution and a wound protector were placed.  I externalized the right colon.  I used the Doppler probe to interrogate the mesentery in the area of the middle colic artery.  There was a good triphasic Doppler signal.  I divided the transverse colon proximal to the middle colic artery with a single firing of the linear cutting stapler using an intestinal cartridge.  The specimen was submitted for permanent histopathology labeled as right colon.  I then created an ileocolic anastomosis between the terminal ileum and transverse colon.  I used a single firing of the linear cutting stapler with intestinal cartridge.  The common enterotomy used to introduce the stapler was closed in 2 layers with running 3-0 chromic followed by interrupted imbricating 3-0 silk. The anastomosis was then returned to the abdominal cavity.  I removed the wound protector, and closed the midline incision with a 0 PDS running suture. The abdomen was reinsufflated.  I irrigated with a liter of warm saline.  I was pleased with hemostasis.  I was pleased with the position of small bowel. No inadvertent enterotomies were identified. The insufflation wwaas evacuated and the trocars were removed. The skin was closed with 4-0 Vicryl and streri-strips. He tolerated the procedure very well, and is  transported to the recovery room in stable condition.

## 2017-06-01 NOTE — ANESTHESIA PREPROCEDURE EVALUATION
Anesthesia Evaluation     Patient summary reviewed and Nursing notes reviewed   no history of anesthetic complications:  NPO Solid Status: > 8 hours  NPO Liquid Status: > 2 hours     Airway   Mallampati: II  TM distance: >3 FB  Neck ROM: full  no difficulty expected  Dental    (+) lower dentures and upper dentures    Pulmonary - normal exam    breath sounds clear to auscultation  (+) a smoker Former,   Cardiovascular - normal exam    ECG reviewed  Rhythm: regular  Rate: normal    (+) hypertension, past MI  >12 months, CAD, CABG, hyperlipidemia    ROS comment: EF = 52%    Neuro/Psych- negative ROS  GI/Hepatic/Renal/Endo    (+) obesity,  diabetes mellitus type 2 using insulin, hypothyroidism,     Musculoskeletal (-) negative ROS    Abdominal   (+) obese,    Substance History - negative use     OB/GYN          Other        (-) history of cancer                                  Anesthesia Plan    ASA 3     general     intravenous induction   Anesthetic plan and risks discussed with patient.

## 2017-06-01 NOTE — PLAN OF CARE
Problem: Patient Care Overview (Adult)  Goal: Plan of Care Review  Outcome: Ongoing (interventions implemented as appropriate)    06/01/17 0722   Coping/Psychosocial Response Interventions   Plan Of Care Reviewed With patient       Goal: Adult Individualization and Mutuality  Outcome: Ongoing (interventions implemented as appropriate)    06/01/17 0722   Individualization   Patient Specific Preferences none       Goal: Discharge Needs Assessment  Outcome: Ongoing (interventions implemented as appropriate)    06/01/17 0722   Discharge Needs Assessment   Concerns To Be Addressed no discharge needs identified         Problem: Perioperative Period (Adult)  Goal: Signs and Symptoms of Listed Potential Problems Will be Absent or Manageable (Perioperative Period)  Outcome: Ongoing (interventions implemented as appropriate)    06/01/17 0722   Perioperative Period   Problems Assessed (Perioperative Period) all   Problems Present (Perioperative Period) none

## 2017-06-01 NOTE — H&P (VIEW-ONLY)
CHIEF COMPLAINT:    Hepatic flexure mass    HISTORY OF PRESENT ILLNESS:    Eric Liu is a 66 y.o. male who underwent two colonoscopies recently by Dr. Mushtaq Booth.  There were tubulovillous adenomas with low-grade dysplasia identified.  During the recent exam on 4/25/2017, an endoscopically unresectable tubulovillous adenoma was identified at the hepatic flexure.  It was biopsied and tattooed.  He does not have GI symptoms.  There is no blood in the stool or melena.  There is no diarrhea or constipation.  Family history is negative for colon cancer.    Past Medical History:   Diagnosis Date   • Acute bronchitis, unspecified    • Anemia    • Atherosclerotic heart disease of native coronary artery without angina pectoris    • CAD (coronary atherosclerotic disease)    • Chest pain    • Colon polyp    • High risk medication use    • History of transfusion    • Hyperlipidemia    • Hyperplasia of prostate without urinary obstruction    • Hypertension    • Hypothyroidism (acquired)    • Myocardial infarction    • Retinal hemorrhage    • Type 2 diabetes mellitus        Past Surgical History:   Procedure Laterality Date   • CARDIAC CATHETERIZATION N/A 01/27/2011    Dr. Sánchez Casanova   • CATARACT EXTRACTION Bilateral    • COLONOSCOPY N/A 01/31/2005    Colonic diverticulosis, internal hemorrhoids, and a few small colon polyps; Dr. Mushtaq Booth   • COLONOSCOPY N/A 1/24/2017    Procedure: COLONOSCOPY TO CECUM AND TERMINAL ILEUM WITH COLD BIOPSY AND HOT SNARE POLYPECTOMIES, INJECTED WITH NORMAL SALINE 3ML, INJECTED WITH SPOT INK 5ML, AND BIOPSIES;  Surgeon: Mushtaq Booth MD;  Location: [facility] ENDOSCOPY;  Service:    • COLONOSCOPY N/A 4/25/2017    Procedure: COLONOSCOPY to cecum and terminal ileum with cold polypectomy, and biopsies and tattoo (5cc) to mass at 75cm ;  Surgeon: Mushtaq Booth MD;  Location: [facility] ENDOSCOPY;  Service:    • CORONARY ARTERY BYPASS GRAFT  02/08/2011    x5; Dr. Brandon Bucio   • OTHER SURGICAL HISTORY       "Laser Suite Ret Treatment Pan-Ablated Inferior Nasal Retina   • TONSILLECTOMY           Current Outpatient Prescriptions:   •  amLODIPine (NORVASC) 5 MG tablet, TAKE ONE-HALF TABLET BY MOUTH EVERY MORNING AND TAKE ONE TABLET BY MOUTH EVERY EVENING, Disp: 45 tablet, Rfl: 3  •  aspirin 325 MG tablet, Take 1 tablet by mouth daily., Disp: , Rfl:   •  atorvastatin (LIPITOR) 80 MG tablet, TAKE ONE-HALF TABLET BY MOUTH DAILY, Disp: 45 tablet, Rfl: 1  •  B-D INS SYRINGE 0.5CC/31GX5/16 31G X 5/16\" 0.5 ML misc, USE TWO TIMES A DAY, Disp: 100 each, Rfl: 0  •  carvedilol (COREG) 25 MG tablet, TAKE ONE TABLET BY MOUTH TWICE A DAY, Disp: 180 tablet, Rfl: 0  •  insulin NPH-insulin regular (HUMULIN 70/30) (70-30) 100 UNIT/ML injection, 35 units QAM; 33 units QPM, Disp: 30 mL, Rfl: 11  •  Insulin Pen Needle (BD PEN NEEDLE JUAN LUIS U/F) 32G X 4 MM misc, 2 (two) times a day. Use to inject insulin, Disp: , Rfl:   •  levothyroxine (SYNTHROID, LEVOTHROID) 88 MCG tablet, TAKE ONE TABLET BY MOUTH DAILY, Disp: 90 tablet, Rfl: 2  •  lisinopril (PRINIVIL,ZESTRIL) 40 MG tablet, TAKE ONE TABLET BY MOUTH TWICE A DAY, Disp: 60 tablet, Rfl: 4  •  metFORMIN (GLUCOPHAGE) 1000 MG tablet, TAKE ONE TABLET BY MOUTH TWICE A DAY WITH MEALS, Disp: 60 tablet, Rfl: 2  •  Omega-3 Fatty Acids (FISH OIL) 1000 MG capsule capsule, Take 1,000 mg by mouth Daily With Breakfast., Disp: , Rfl:     No Known Allergies    Family History   Problem Relation Age of Onset   • Heart attack Father    • Heart disease Father    • Hypertension Other        Social History     Social History   • Marital status:      Spouse name: N/A   • Number of children: N/A   • Years of education: N/A     Occupational History   • Not on file.     Social History Main Topics   • Smoking status: Former Smoker     Quit date: 3/8/1986   • Smokeless tobacco: Never Used   • Alcohol use Yes      Comment: occasionally   • Drug use: No   • Sexual activity: Defer     Other Topics Concern   • Not on file " "    Social History Narrative       Review of Systems   All other systems reviewed and are negative.      Objective     Ht 71\" (180.3 cm)  Wt 222 lb 6.4 oz (101 kg)  BMI 31.02 kg/m2    Physical Exam   Constitutional: He is oriented to person, place, and time. He appears well-developed and well-nourished.   HENT:   Head: Normocephalic and atraumatic.   Eyes: Conjunctivae are normal. No scleral icterus.   Cardiovascular: Normal rate, regular rhythm, normal heart sounds and intact distal pulses.    Pulmonary/Chest: Effort normal and breath sounds normal. He has no wheezes.   Abdominal: Soft. Bowel sounds are normal. There is no tenderness. There is no rebound and no guarding. No hernia.   No palpable masses   Musculoskeletal: He exhibits no edema.   Neurological: He is alert and oriented to person, place, and time.   Skin: Skin is warm and dry.   Psychiatric: He has a normal mood and affect.   Nursing note and vitals reviewed.      DIAGNOSTIC DATA:    I reviewed the colonoscopy report and the recent biopsy reports.    ASSESSMENT:    Endoscopically unresectable tubulovillous adenoma of the colon at the hepatic flexure    PLAN:    Laparoscopic right hemicolectomy.  We discussed the operative procedure, the expected postoperative hospitalization, the expected recovery period, and common intraoperative and postoperative problems.  In particular, we discussed infectious problems and postoperative diarrhea.  His interested in proceeding, and we will schedule a date for surgery.  "

## 2017-06-01 NOTE — ANESTHESIA POSTPROCEDURE EVALUATION
Patient: Eric Liu    Procedure Summary     Date Anesthesia Start Anesthesia Stop Room / Location    06/01/17 0903 1227  [facility] / [facility]       Procedure Diagnosis Surgeon Provider    LAPAROSCOPIC RIGHT COLON RESECTION (Right Abdomen) Mass of hepatic flexure of colon  (Mass of hepatic flexure of colon [K63.9]) MD Mannie Nunez MD          Anesthesia Type: general  Last vitals  /62 (06/01/17 1315)    Temp      Pulse 60 (06/01/17 1315)   Resp 16 (06/01/17 1315)    SpO2 95 % (06/01/17 1315)      Post Anesthesia Care and Evaluation    Patient location during evaluation: bedside  Patient participation: complete - patient participated  Level of consciousness: awake  Pain score: 2  Pain management: adequate  Airway patency: patent  Anesthetic complications: No anesthetic complications    Cardiovascular status: acceptable  Respiratory status: acceptable  Hydration status: acceptable

## 2017-06-01 NOTE — PLAN OF CARE
Problem: Patient Care Overview (Adult)  Goal: Plan of Care Review  Outcome: Ongoing (interventions implemented as appropriate)    06/01/17 1744   Coping/Psychosocial Response Interventions   Plan Of Care Reviewed With patient;spouse   Patient Care Overview   Progress progress towards functional goals is fair   Outcome Evaluation   Outcome Summary/Follow up Plan Tolerating clear liquids; lizeth active bowel sounds. Encouraged to use analgesia so as to be able to deep breathe better, and move more comfortably.         06/01/17 1744   Coping/Psychosocial Response Interventions   Plan Of Care Reviewed With patient;spouse   Patient Care Overview   Progress progress towards functional goals is fair   Outcome Evaluation   Outcome Summary/Follow up Plan Tolerating clear liquids; lizeth active bowel sounds. Encouraged to use analgesia so as to be able to deep breathe better, and move more comfortably.       Goal: Adult Individualization and Mutuality  Outcome: Ongoing (interventions implemented as appropriate)  Goal: Discharge Needs Assessment  Outcome: Ongoing (interventions implemented as appropriate)    Problem: Perioperative Period (Adult)  Goal: Signs and Symptoms of Listed Potential Problems Will be Absent or Manageable (Perioperative Period)  Outcome: Ongoing (interventions implemented as appropriate)

## 2017-06-01 NOTE — ANESTHESIA PROCEDURE NOTES
Airway  Urgency: elective      General Information and Staff    Patient location during procedure: OR  Anesthesiologist: LUCILLE DÍAZ    Indications and Patient Condition    Preoxygenated: yes      Final Airway Details  Final airway type: endotracheal airway      Successful airway: ETT  Cuffed: yes   Successful intubation technique: direct laryngoscopy  Endotracheal tube insertion site: oral  Blade: Aleyda  Blade size: #3  ETT size: 8.0 mm  Cormack-Lehane Classification: grade I - full view of glottis  Placement verified by: chest auscultation   Number of attempts at approach: 1

## 2017-06-02 LAB
ANION GAP SERPL CALCULATED.3IONS-SCNC: 13.3 MMOL/L
BASOPHILS # BLD AUTO: 0.03 10*3/MM3 (ref 0–0.2)
BASOPHILS NFR BLD AUTO: 0.3 % (ref 0–1.5)
BUN BLD-MCNC: 10 MG/DL (ref 8–23)
BUN/CREAT SERPL: 10.1 (ref 7–25)
CALCIUM SPEC-SCNC: 7.6 MG/DL (ref 8.6–10.5)
CHLORIDE SERPL-SCNC: 97 MMOL/L (ref 98–107)
CO2 SERPL-SCNC: 26.7 MMOL/L (ref 22–29)
CREAT BLD-MCNC: 0.99 MG/DL (ref 0.76–1.27)
CYTO UR: NORMAL
DEPRECATED RDW RBC AUTO: 42.2 FL (ref 37–54)
EOSINOPHIL # BLD AUTO: 0.21 10*3/MM3 (ref 0–0.7)
EOSINOPHIL NFR BLD AUTO: 1.9 % (ref 0.3–6.2)
ERYTHROCYTE [DISTWIDTH] IN BLOOD BY AUTOMATED COUNT: 14 % (ref 11.5–14.5)
GFR SERPL CREATININE-BSD FRML MDRD: 75 ML/MIN/1.73
GLUCOSE BLD-MCNC: 216 MG/DL (ref 65–99)
GLUCOSE BLDC GLUCOMTR-MCNC: 220 MG/DL (ref 70–130)
GLUCOSE BLDC GLUCOMTR-MCNC: 232 MG/DL (ref 70–130)
GLUCOSE BLDC GLUCOMTR-MCNC: 270 MG/DL (ref 70–130)
GLUCOSE BLDC GLUCOMTR-MCNC: 325 MG/DL (ref 70–130)
HCT VFR BLD AUTO: 37.7 % (ref 40.4–52.2)
HGB BLD-MCNC: 12.8 G/DL (ref 13.7–17.6)
IMM GRANULOCYTES # BLD: 0.02 10*3/MM3 (ref 0–0.03)
IMM GRANULOCYTES NFR BLD: 0.2 % (ref 0–0.5)
LAB AP CASE REPORT: NORMAL
LYMPHOCYTES # BLD AUTO: 2.33 10*3/MM3 (ref 0.9–4.8)
LYMPHOCYTES NFR BLD AUTO: 21 % (ref 19.6–45.3)
Lab: NORMAL
MCH RBC QN AUTO: 28.1 PG (ref 27–32.7)
MCHC RBC AUTO-ENTMCNC: 34 G/DL (ref 32.6–36.4)
MCV RBC AUTO: 82.7 FL (ref 79.8–96.2)
MONOCYTES # BLD AUTO: 1.52 10*3/MM3 (ref 0.2–1.2)
MONOCYTES NFR BLD AUTO: 13.7 % (ref 5–12)
NEUTROPHILS # BLD AUTO: 7.01 10*3/MM3 (ref 1.9–8.1)
NEUTROPHILS NFR BLD AUTO: 62.9 % (ref 42.7–76)
PATH REPORT.FINAL DX SPEC: NORMAL
PATH REPORT.GROSS SPEC: NORMAL
PLATELET # BLD AUTO: 227 10*3/MM3 (ref 140–500)
PMV BLD AUTO: 11.3 FL (ref 6–12)
POTASSIUM BLD-SCNC: 3.5 MMOL/L (ref 3.5–5.2)
RBC # BLD AUTO: 4.56 10*6/MM3 (ref 4.6–6)
SODIUM BLD-SCNC: 137 MMOL/L (ref 136–145)
WBC NRBC COR # BLD: 11.12 10*3/MM3 (ref 4.5–10.7)

## 2017-06-02 PROCEDURE — 80048 BASIC METABOLIC PNL TOTAL CA: CPT | Performed by: SURGERY

## 2017-06-02 PROCEDURE — 25010000002 ENOXAPARIN PER 10 MG: Performed by: SURGERY

## 2017-06-02 PROCEDURE — 82962 GLUCOSE BLOOD TEST: CPT

## 2017-06-02 PROCEDURE — 99024 POSTOP FOLLOW-UP VISIT: CPT | Performed by: SURGERY

## 2017-06-02 PROCEDURE — 25010000002 HYDROMORPHONE PER 4 MG: Performed by: SURGERY

## 2017-06-02 PROCEDURE — 85025 COMPLETE CBC W/AUTO DIFF WBC: CPT | Performed by: SURGERY

## 2017-06-02 PROCEDURE — 63710000001 INSULIN ASPART PER 5 UNITS: Performed by: SURGERY

## 2017-06-02 RX ADMIN — PANTOPRAZOLE SODIUM 40 MG: 40 TABLET, DELAYED RELEASE ORAL at 06:48

## 2017-06-02 RX ADMIN — LEVOTHYROXINE SODIUM 88 MCG: 88 TABLET ORAL at 10:29

## 2017-06-02 RX ADMIN — INSULIN ASPART 5 UNITS: 100 INJECTION, SOLUTION INTRAVENOUS; SUBCUTANEOUS at 17:00

## 2017-06-02 RX ADMIN — CARVEDILOL 25 MG: 25 TABLET, FILM COATED ORAL at 08:01

## 2017-06-02 RX ADMIN — SODIUM CHLORIDE 75 ML/HR: 4.5 INJECTION, SOLUTION INTRAVENOUS at 22:06

## 2017-06-02 RX ADMIN — HYDROMORPHONE HYDROCHLORIDE 0.5 MG: 1 INJECTION, SOLUTION INTRAMUSCULAR; INTRAVENOUS; SUBCUTANEOUS at 21:59

## 2017-06-02 RX ADMIN — SODIUM CHLORIDE 100 ML/HR: 4.5 INJECTION, SOLUTION INTRAVENOUS at 00:35

## 2017-06-02 RX ADMIN — ALVIMOPAN 12 MG: 12 CAPSULE ORAL at 17:00

## 2017-06-02 RX ADMIN — HYDROMORPHONE HYDROCHLORIDE 0.5 MG: 1 INJECTION, SOLUTION INTRAMUSCULAR; INTRAVENOUS; SUBCUTANEOUS at 03:15

## 2017-06-02 RX ADMIN — ENOXAPARIN SODIUM 40 MG: 40 INJECTION SUBCUTANEOUS at 10:29

## 2017-06-02 RX ADMIN — INSULIN ASPART 5 UNITS: 100 INJECTION, SOLUTION INTRAVENOUS; SUBCUTANEOUS at 07:58

## 2017-06-02 RX ADMIN — LISINOPRIL 40 MG: 40 TABLET ORAL at 10:29

## 2017-06-02 RX ADMIN — LISINOPRIL 40 MG: 40 TABLET ORAL at 17:00

## 2017-06-02 RX ADMIN — AMLODIPINE BESYLATE 5 MG: 5 TABLET ORAL at 17:00

## 2017-06-02 RX ADMIN — ASPIRIN 325 MG: 325 TABLET ORAL at 08:01

## 2017-06-02 RX ADMIN — SODIUM CHLORIDE 100 ML/HR: 4.5 INJECTION, SOLUTION INTRAVENOUS at 09:07

## 2017-06-02 RX ADMIN — AMLODIPINE BESYLATE 5 MG: 5 TABLET ORAL at 08:01

## 2017-06-02 RX ADMIN — ATORVASTATIN CALCIUM 40 MG: 20 TABLET, FILM COATED ORAL at 08:01

## 2017-06-02 RX ADMIN — INSULIN ASPART 8 UNITS: 100 INJECTION, SOLUTION INTRAVENOUS; SUBCUTANEOUS at 21:53

## 2017-06-02 RX ADMIN — INSULIN ASPART 10 UNITS: 100 INJECTION, SOLUTION INTRAVENOUS; SUBCUTANEOUS at 12:07

## 2017-06-02 RX ADMIN — ALVIMOPAN 12 MG: 12 CAPSULE ORAL at 08:01

## 2017-06-02 RX ADMIN — CARVEDILOL 25 MG: 25 TABLET, FILM COATED ORAL at 17:00

## 2017-06-02 NOTE — PLAN OF CARE
Problem: Patient Care Overview (Adult)  Goal: Plan of Care Review  Outcome: Ongoing (interventions implemented as appropriate)    06/02/17 0453   Coping/Psychosocial Response Interventions   Plan Of Care Reviewed With patient   Patient Care Overview   Progress no change   Outcome Evaluation   Outcome Summary/Follow up Plan Pt given prn pain medication for pain x1. No c/o nausea. Still no bowel sounds. Scheduled meds given. Lastly, gómez to be taken out this AM.          Problem: Pain, Acute (Adult)  Goal: Identify Related Risk Factors and Signs and Symptoms  Outcome: Ongoing (interventions implemented as appropriate)  Goal: Acceptable Pain Control/Comfort Level  Outcome: Ongoing (interventions implemented as appropriate)

## 2017-06-02 NOTE — PROGRESS NOTES
Chief Complaint:    POD 1, S/P laparoscopic right hemicolectomy    Subjective:    Feels good. Pain controlled.    Objective:    Vitals:    06/01/17 1822 06/01/17 2101 06/02/17 0403 06/02/17 0755   BP: 132/64 153/71 154/74 124/64   BP Location: Right arm Right arm Right arm Left arm   Patient Position: Lying Sitting Lying Sitting   Pulse: 84 80 82 76   Resp: 18 16 16 18   Temp: 96.9 °F (36.1 °C) 98.6 °F (37 °C) 97.8 °F (36.6 °C) 98.5 °F (36.9 °C)   TempSrc: Oral Oral Oral Oral   SpO2: 90% 94% 91% 92%   Weight:       Height:           Lungs: Clear  Heart: Regular  Abdomen: Incisions dressed and dry. BS present.   Extremities: Warm    Labs reviewed. Path pending.    Assessment:    POD 1, S/P laparoscopic right hemicolectomy for an endoscopically unresectable tubulovillous adenoma of the colon at the hepatic flexure  Diabetes  Hypertension    Plan:    Elder removed today  Encouraged ambulation  Continue alvimopan  Decrease IVF  Advance to full liquid diet, and keep an eye on blood glucose.

## 2017-06-02 NOTE — PROGRESS NOTES
Discharge Planning Assessment  [facility]     Patient Name: Eric Liu  MRN: [id number]  Today's Date: 6/2/2017    Admit Date: 6/1/2017          Discharge Needs Assessment       06/02/17 1220    Living Environment    Lives With spouse    Living Arrangements house    Home Accessibility no concerns    Type of Financial/Environmental Concern none    Transportation Available family or friend will provide;car    Living Environment    Quality Of Family Relationships helpful;involved    Discharge Needs Assessment    Concerns To Be Addressed denies needs/concerns at this time    Readmission Within The Last 30 Days no previous admission in last 30 days    Anticipated Changes Related to Illness none    Discharge Disposition still a patient            Discharge Plan       06/02/17 1220    Case Management/Social Work Plan    Plan Home with wife    Additional Comments Met with pt at bedside- verified info on facesheet. Pt lives with his wife in a SS home with no basement. Pt drives and is IADL's. Pt uses no assistive devices. Pt confirmed [facility] ([facility]) and denies problems affording medications. Pt has used [facility] in the past but currently denies a need for services. Pt has not been in skilled rehab. Pt plans to return home with his wife and discharge and currently denies CCP needs. CCP will continue to follow and assist as needed.............ALEYDA Brown, CCP        Discharge Placement     No information found                Demographic Summary       06/02/17 1219    Referral Information    Admission Type inpatient    Arrived From still a patient    Reason For Consult discharge planning    Record Reviewed medical record    Contact Information    Permission Granted to Share Information With family/designee    Comments wife Diana or daughter Hannah            Functional Status       06/02/17 1219    Functional Status Current    Ambulation 3-->assistive equipment and person    Transferring 3-->assistive  equipment and person    Toileting 2-->assistive person    Bathing 2-->assistive person    Dressing 2-->assistive person    Eating 0-->independent    Functional Status Prior    Ambulation 0-->independent    Transferring 0-->independent    Toileting 0-->independent    Bathing 0-->independent    Dressing 0-->independent    Eating 0-->independent            Psychosocial     None            Abuse/Neglect     None            Legal     None            Substance Abuse     None            Patient Forms     None          Patrick Cardona RN

## 2017-06-02 NOTE — PLAN OF CARE
Problem: Patient Care Overview (Adult)  Goal: Plan of Care Review  Outcome: Ongoing (interventions implemented as appropriate)    06/02/17 1416   Coping/Psychosocial Response Interventions   Plan Of Care Reviewed With patient   Patient Care Overview   Progress progress toward functional goals as expected   Outcome Evaluation   Outcome Summary/Follow up Plan ambulated peterson diet advanced to full liquid no gas or bm sugars remain elevated coverred with sliding scale       Goal: Adult Individualization and Mutuality  Outcome: Ongoing (interventions implemented as appropriate)  Goal: Discharge Needs Assessment  Outcome: Ongoing (interventions implemented as appropriate)    Problem: Perioperative Period (Adult)  Goal: Signs and Symptoms of Listed Potential Problems Will be Absent or Manageable (Perioperative Period)  Outcome: Ongoing (interventions implemented as appropriate)    Problem: Pain, Acute (Adult)  Goal: Identify Related Risk Factors and Signs and Symptoms  Outcome: Ongoing (interventions implemented as appropriate)  Goal: Acceptable Pain Control/Comfort Level  Outcome: Ongoing (interventions implemented as appropriate)

## 2017-06-03 LAB
ANION GAP SERPL CALCULATED.3IONS-SCNC: 12.8 MMOL/L
BUN BLD-MCNC: 9 MG/DL (ref 8–23)
BUN/CREAT SERPL: 10 (ref 7–25)
CALCIUM SPEC-SCNC: 8 MG/DL (ref 8.6–10.5)
CHLORIDE SERPL-SCNC: 100 MMOL/L (ref 98–107)
CO2 SERPL-SCNC: 27.2 MMOL/L (ref 22–29)
CREAT BLD-MCNC: 0.9 MG/DL (ref 0.76–1.27)
GFR SERPL CREATININE-BSD FRML MDRD: 84 ML/MIN/1.73
GLUCOSE BLD-MCNC: 212 MG/DL (ref 65–99)
GLUCOSE BLDC GLUCOMTR-MCNC: 191 MG/DL (ref 70–130)
GLUCOSE BLDC GLUCOMTR-MCNC: 248 MG/DL (ref 70–130)
GLUCOSE BLDC GLUCOMTR-MCNC: 275 MG/DL (ref 70–130)
GLUCOSE BLDC GLUCOMTR-MCNC: 322 MG/DL (ref 70–130)
POTASSIUM BLD-SCNC: 3.4 MMOL/L (ref 3.5–5.2)
SODIUM BLD-SCNC: 140 MMOL/L (ref 136–145)

## 2017-06-03 PROCEDURE — 80048 BASIC METABOLIC PNL TOTAL CA: CPT | Performed by: SURGERY

## 2017-06-03 PROCEDURE — 25010000002 HYDROMORPHONE PER 4 MG: Performed by: SURGERY

## 2017-06-03 PROCEDURE — 99024 POSTOP FOLLOW-UP VISIT: CPT | Performed by: SURGERY

## 2017-06-03 PROCEDURE — 82962 GLUCOSE BLOOD TEST: CPT

## 2017-06-03 PROCEDURE — 63710000001 INSULIN ASPART PER 5 UNITS: Performed by: SURGERY

## 2017-06-03 PROCEDURE — 25010000002 ENOXAPARIN PER 10 MG: Performed by: SURGERY

## 2017-06-03 RX ORDER — INSULIN ASPART 100 [IU]/ML
20 INJECTION, SUSPENSION SUBCUTANEOUS 2 TIMES DAILY WITH MEALS
Status: DISCONTINUED | OUTPATIENT
Start: 2017-06-03 | End: 2017-06-04 | Stop reason: HOSPADM

## 2017-06-03 RX ADMIN — INSULIN ASPART 8 UNITS: 100 INJECTION, SOLUTION INTRAVENOUS; SUBCUTANEOUS at 17:40

## 2017-06-03 RX ADMIN — ASPIRIN 325 MG: 325 TABLET ORAL at 08:01

## 2017-06-03 RX ADMIN — HYDROMORPHONE HYDROCHLORIDE 0.5 MG: 1 INJECTION, SOLUTION INTRAMUSCULAR; INTRAVENOUS; SUBCUTANEOUS at 02:47

## 2017-06-03 RX ADMIN — INSULIN ASPART 3 UNITS: 100 INJECTION, SOLUTION INTRAVENOUS; SUBCUTANEOUS at 07:59

## 2017-06-03 RX ADMIN — AMLODIPINE BESYLATE 5 MG: 5 TABLET ORAL at 08:01

## 2017-06-03 RX ADMIN — INSULIN ASPART 10 UNITS: 100 INJECTION, SOLUTION INTRAVENOUS; SUBCUTANEOUS at 12:06

## 2017-06-03 RX ADMIN — PANTOPRAZOLE SODIUM 40 MG: 40 TABLET, DELAYED RELEASE ORAL at 06:55

## 2017-06-03 RX ADMIN — OXYCODONE HYDROCHLORIDE AND ACETAMINOPHEN 1 TABLET: 10; 325 TABLET ORAL at 22:29

## 2017-06-03 RX ADMIN — CARVEDILOL 25 MG: 25 TABLET, FILM COATED ORAL at 17:39

## 2017-06-03 RX ADMIN — ALVIMOPAN 12 MG: 12 CAPSULE ORAL at 08:03

## 2017-06-03 RX ADMIN — ENOXAPARIN SODIUM 40 MG: 40 INJECTION SUBCUTANEOUS at 08:09

## 2017-06-03 RX ADMIN — ATORVASTATIN CALCIUM 40 MG: 20 TABLET, FILM COATED ORAL at 08:02

## 2017-06-03 RX ADMIN — INSULIN ASPART 20 UNITS: 100 INJECTION, SUSPENSION SUBCUTANEOUS at 18:00

## 2017-06-03 RX ADMIN — INSULIN ASPART 5 UNITS: 100 INJECTION, SOLUTION INTRAVENOUS; SUBCUTANEOUS at 21:56

## 2017-06-03 RX ADMIN — CARVEDILOL 25 MG: 25 TABLET, FILM COATED ORAL at 08:02

## 2017-06-03 RX ADMIN — LISINOPRIL 40 MG: 40 TABLET ORAL at 17:39

## 2017-06-03 RX ADMIN — METFORMIN HYDROCHLORIDE 1000 MG: 1000 TABLET ORAL at 18:00

## 2017-06-03 RX ADMIN — AMLODIPINE BESYLATE 5 MG: 5 TABLET ORAL at 17:39

## 2017-06-03 RX ADMIN — LEVOTHYROXINE SODIUM 88 MCG: 88 TABLET ORAL at 08:02

## 2017-06-03 RX ADMIN — LISINOPRIL 40 MG: 40 TABLET ORAL at 08:03

## 2017-06-03 RX ADMIN — SODIUM CHLORIDE 75 ML/HR: 4.5 INJECTION, SOLUTION INTRAVENOUS at 10:51

## 2017-06-03 NOTE — PLAN OF CARE
Problem: Patient Care Overview (Adult)  Goal: Plan of Care Review  Outcome: Ongoing (interventions implemented as appropriate)    06/03/17 1831   Coping/Psychosocial Response Interventions   Plan Of Care Reviewed With patient   Patient Care Overview   Progress improving   Outcome Evaluation   Outcome Summary/Follow up Plan Amb 8 laps this am and 11 laps this pm around nursing station. Long acting insulin and oral hypoglycemic restarted. this pm. Accuchecks high today. Lovenox discontinued.       06/03/17 1831   Coping/Psychosocial Response Interventions   Plan Of Care Reviewed With patient   Patient Care Overview   Progress improving   Outcome Evaluation   Outcome Summary/Follow up Plan Amb 8 laps this am and 11 laps this pm around nursing station. Long acting insulin and oral hypoglycemic restarted. this pm. Accuchecks high today.            Goal: Adult Individualization and Mutuality  Outcome: Ongoing (interventions implemented as appropriate)    Problem: Perioperative Period (Adult)  Goal: Signs and Symptoms of Listed Potential Problems Will be Absent or Manageable (Perioperative Period)  Outcome: Outcome(s) achieved Date Met:  06/03/17    Problem: Pain, Acute (Adult)  Goal: Identify Related Risk Factors and Signs and Symptoms  Outcome: Ongoing (interventions implemented as appropriate)  Goal: Acceptable Pain Control/Comfort Level  Outcome: Ongoing (interventions implemented as appropriate)

## 2017-06-03 NOTE — PROGRESS NOTES
Chief Complaint:    POD 2, S/P laparoscopic right hemicolectomy    Subjective:    Feels good. Pain controlled. +BM.    Objective:    Vitals:    06/02/17 2059 06/03/17 0405 06/03/17 0735 06/03/17 1211   BP: 156/75 147/68 158/64 132/64   BP Location: Right arm Right arm Left arm Left arm   Patient Position: Lying Lying Lying Sitting   Pulse: 79 81 80 82   Resp: 16 16 18 20   Temp: 98.9 °F (37.2 °C) 99 °F (37.2 °C) 98.3 °F (36.8 °C) 96.6 °F (35.9 °C)   TempSrc: Oral Oral Oral Oral   SpO2: 94% 92% 91% 94%   Weight:       Height:           Lungs: Clear  Heart: Regular  Abdomen: Incisions dressed and dry. BS present.   Extremities: Warm    Path shows a tubulovillous adenoma with high-grade dysplasia.  Glucose 191-325    Assessment:    POD 2, S/P laparoscopic right hemicolectomy for an endoscopically unresectable tubulovillous adenoma of the colon at the hepatic flexure  Diabetes  Hypertension    Plan:    Encouraged ambulation  Discontinue alvimopan  Stop IVF  Advance to regular liquid diet, and keep an eye on blood glucose.  Resume his metformin.  I am going to give him 2/3 of his usual insulin, and continue the sliding scale.  Home tomorrow.

## 2017-06-04 VITALS
HEART RATE: 69 BPM | HEIGHT: 71 IN | SYSTOLIC BLOOD PRESSURE: 156 MMHG | WEIGHT: 218.5 LBS | OXYGEN SATURATION: 95 % | TEMPERATURE: 98.6 F | RESPIRATION RATE: 18 BRPM | DIASTOLIC BLOOD PRESSURE: 75 MMHG | BODY MASS INDEX: 30.59 KG/M2

## 2017-06-04 LAB — GLUCOSE BLDC GLUCOMTR-MCNC: 142 MG/DL (ref 70–130)

## 2017-06-04 PROCEDURE — 82962 GLUCOSE BLOOD TEST: CPT

## 2017-06-04 PROCEDURE — 63710000001 INSULIN ASPART PER 5 UNITS: Performed by: SURGERY

## 2017-06-04 RX ORDER — OXYCODONE AND ACETAMINOPHEN 10; 325 MG/1; MG/1
1 TABLET ORAL EVERY 8 HOURS PRN
Qty: 15 TABLET | Refills: 0 | Status: SHIPPED | OUTPATIENT
Start: 2017-06-04 | End: 2017-06-11

## 2017-06-04 RX ADMIN — INSULIN ASPART 20 UNITS: 100 INJECTION, SUSPENSION SUBCUTANEOUS at 08:13

## 2017-06-04 RX ADMIN — LISINOPRIL 40 MG: 40 TABLET ORAL at 08:16

## 2017-06-04 RX ADMIN — METFORMIN HYDROCHLORIDE 1000 MG: 1000 TABLET ORAL at 08:16

## 2017-06-04 RX ADMIN — LEVOTHYROXINE SODIUM 88 MCG: 88 TABLET ORAL at 06:49

## 2017-06-04 RX ADMIN — ATORVASTATIN CALCIUM 40 MG: 20 TABLET, FILM COATED ORAL at 08:16

## 2017-06-04 RX ADMIN — PANTOPRAZOLE SODIUM 40 MG: 40 TABLET, DELAYED RELEASE ORAL at 06:49

## 2017-06-04 RX ADMIN — AMLODIPINE BESYLATE 5 MG: 5 TABLET ORAL at 08:16

## 2017-06-04 RX ADMIN — CARVEDILOL 25 MG: 25 TABLET, FILM COATED ORAL at 08:16

## 2017-06-04 RX ADMIN — ASPIRIN 325 MG: 325 TABLET ORAL at 08:17

## 2017-06-04 NOTE — PLAN OF CARE
Problem: Patient Care Overview (Adult)  Goal: Plan of Care Review  Outcome: Outcome(s) achieved Date Met:  06/04/17  Goal: Adult Individualization and Mutuality  Outcome: Outcome(s) achieved Date Met:  06/04/17  Goal: Discharge Needs Assessment  Outcome: Outcome(s) achieved Date Met:  06/04/17

## 2017-06-04 NOTE — DISCHARGE SUMMARY
DATE OF ADMISSION:  6/1/2017  DATE OF DISCHARGE:  6/4/2017    ATTENDING:        Pollo Liao M.D.       GENERAL SURGERY    PRIMARY CARE PHYSICIAN: Ralph Chadwick MD    CONSULTS:    None    PRINCIPAL DIAGNOSIS:     Endoscopically unresectable polyp of the ascending colon    DISCHARGE DIAGNOSES:     Hypertension  Diabetes  Hypothyroidism    HOSPITAL PROCEDURES:     Laparoscopic right colectomy by Dr Liao on 6/1/2017    HOSPITALCOURSE:   He was admitted for laparoscopic right colectomy to remove a large polyp found on colonoscopy by Dr Booth that was endoscopically unresectable. His operation and postoperative course were unremarkable. The path report shows an adenomatous polyp with high grade dysplasia, good margins and negative lymph nodes.    I am giving him a prescription for percocet for postoperative pain.    ACTIVITY:  Okay to shower.  Ambulate and climb stairs as tolerated.  No lifting over 10 lbs for 2 weeks.  Okay to drive if not taking pain medications.    DIET:  Resume usual diet    FOLLOW UP:  With Dr Liao in 2 weeks. He is instructed to call [phone] for an appointment.

## 2017-06-20 ENCOUNTER — OFFICE VISIT (OUTPATIENT)
Dept: SURGERY | Facility: CLINIC | Age: 67
End: 2017-06-20

## 2017-06-20 DIAGNOSIS — K63.89 MASS OF HEPATIC FLEXURE OF COLON: Primary | ICD-10-CM

## 2017-06-20 DIAGNOSIS — Z86.010 HISTORY OF COLON POLYPS: ICD-10-CM

## 2017-06-20 PROBLEM — Z86.0100 HISTORY OF COLON POLYPS: Status: ACTIVE | Noted: 2017-06-20

## 2017-06-20 PROCEDURE — 99024 POSTOP FOLLOW-UP VISIT: CPT | Performed by: SURGERY

## 2017-06-20 NOTE — PROGRESS NOTES
CHIEF COMPLAINT:    Follow-up from colon resection    HISTORY OF PRESENT ILLNESS:    Eric Liu is a 67 y.o. male who underwent laparoscopic right hemicolectomy for an endoscopically unresectable polyp at the hepatic flexure of the ascending colon.  The surgery took place on 6/1/2017.  He was discharged from the hospital on postoperative day 3.  He has done well since his discharge, and he is tolerating a regular diet.  He is increasing his physical activity, and he wants to start golfing again.  He has 2-3 bowel movements a day.  The first usually occurs in the morning and is usually solid.  The other two usually occur later in the day are often semisolid or liquid.      EXAM:    Alert. Oriented.  Lungs: Clear.  Heart: Regular.  Abdomen: Soft. Incisions healed. No erythema or drainage.  Extremities: Warm.    Path reviewed. Tubulovillous adenoma 2.5 cm in size plus some smaller ones in the specimen.    ASSESSMENT:    Endoscopically unresectable polyp of the hepatic flexure  S/P laparoscopic right hemicolectomy on 6/1/2017    PLAN:    Given the number of polyps within the colon I have recommended follow-up colonoscopy in one year and then in 3 years subsequent to that.  On follow-up with me in 3 months for another postsurgical check.

## 2017-07-05 RX ORDER — AMLODIPINE BESYLATE 5 MG/1
TABLET ORAL
Qty: 45 TABLET | Refills: 2 | Status: SHIPPED | OUTPATIENT
Start: 2017-07-05 | End: 2017-10-04 | Stop reason: SDUPTHER

## 2017-08-03 RX ORDER — SYRINGE-NEEDLE,INSULIN,0.5 ML 31 GX5/16"
SYRINGE, EMPTY DISPOSABLE MISCELLANEOUS
Qty: 100 EACH | Refills: 0 | Status: SHIPPED | OUTPATIENT
Start: 2017-08-03 | End: 2017-11-14 | Stop reason: SDUPTHER

## 2017-08-25 RX ORDER — CARVEDILOL 25 MG/1
TABLET ORAL
Qty: 180 TABLET | Refills: 3 | Status: SHIPPED | OUTPATIENT
Start: 2017-08-25 | End: 2018-11-24 | Stop reason: SDUPTHER

## 2017-09-13 RX ORDER — ATORVASTATIN CALCIUM 80 MG/1
TABLET, FILM COATED ORAL
Qty: 45 TABLET | Refills: 0 | Status: SHIPPED | OUTPATIENT
Start: 2017-09-13 | End: 2017-12-13 | Stop reason: SDUPTHER

## 2017-09-13 RX ORDER — INSULIN NPH HUM/REG INSULIN HM 70-30/ML
VIAL (ML) SUBCUTANEOUS
Qty: 20 ML | Refills: 10 | Status: SHIPPED | OUTPATIENT
Start: 2017-09-13 | End: 2017-10-23 | Stop reason: SDUPTHER

## 2017-09-19 ENCOUNTER — OFFICE VISIT (OUTPATIENT)
Dept: SURGERY | Facility: CLINIC | Age: 67
End: 2017-09-19

## 2017-09-19 VITALS — BODY MASS INDEX: 30.52 KG/M2 | WEIGHT: 218 LBS | HEIGHT: 71 IN

## 2017-09-19 DIAGNOSIS — Z86.010 HISTORY OF COLON POLYPS: Primary | ICD-10-CM

## 2017-09-19 PROCEDURE — 99211 OFF/OP EST MAY X REQ PHY/QHP: CPT | Performed by: SURGERY

## 2017-09-20 RX ORDER — LISINOPRIL 40 MG/1
TABLET ORAL
Qty: 60 TABLET | Refills: 3 | Status: SHIPPED | OUTPATIENT
Start: 2017-09-20 | End: 2018-01-18 | Stop reason: SDUPTHER

## 2017-10-04 RX ORDER — AMLODIPINE BESYLATE 5 MG/1
TABLET ORAL
Qty: 45 TABLET | Refills: 1 | Status: SHIPPED | OUTPATIENT
Start: 2017-10-04 | End: 2017-12-01 | Stop reason: SDUPTHER

## 2017-10-05 RX ORDER — LEVOTHYROXINE SODIUM 88 UG/1
TABLET ORAL
Qty: 90 TABLET | Refills: 1 | Status: SHIPPED | OUTPATIENT
Start: 2017-10-05 | End: 2017-10-23 | Stop reason: SDUPTHER

## 2017-10-12 ENCOUNTER — OFFICE VISIT (OUTPATIENT)
Dept: CARDIOLOGY | Facility: CLINIC | Age: 67
End: 2017-10-12

## 2017-10-12 VITALS
HEART RATE: 65 BPM | WEIGHT: 218.4 LBS | HEIGHT: 71 IN | SYSTOLIC BLOOD PRESSURE: 128 MMHG | BODY MASS INDEX: 30.57 KG/M2 | DIASTOLIC BLOOD PRESSURE: 64 MMHG

## 2017-10-12 DIAGNOSIS — I10 ESSENTIAL HYPERTENSION: ICD-10-CM

## 2017-10-12 DIAGNOSIS — I25.10 CORONARY ARTERIOSCLEROSIS IN NATIVE ARTERY: Primary | ICD-10-CM

## 2017-10-12 PROCEDURE — 99213 OFFICE O/P EST LOW 20 MIN: CPT | Performed by: INTERNAL MEDICINE

## 2017-10-12 PROCEDURE — 93000 ELECTROCARDIOGRAM COMPLETE: CPT | Performed by: INTERNAL MEDICINE

## 2017-10-12 NOTE — PROGRESS NOTES
Date of Office Visit: 10/12/2017  Encounter Provider: Sharri Garcia MD  Place of Service: [facility]ARD[facility]  Patient Name: Eric Liu  :1950    Chief complaint      History of Present Illness  The patient is a pleasant 67-year-old gentleman with diabetes, hypertension,  hyperlipidemia, who has a history of coronary artery bypass grafting in  with prior  inferior wall infarction. He has a normal systolic function. He underwent coronary artery  bypass grafting with a LIMA graft to the LAD, vein graft to the RV branch, as well as a  vein graft to the distal PDA and second obtuse marginal branch, circumflex artery and  third obtuse marginal branch. He had postoperative fluid retention that resolved with  diuretics. He has had intermittent chest pain since then and has had a couple of stress  tests since then. His last stress test in 2013 revealed a small inferior wall  infarction with minimal amount of richard-infarct ischemia. His ejection fraction was felt to  be possibly reduced at 45%. It was unchanged from his prior study a year earlier. However,  he had an echocardiogram that revealed normal left ventricular size and function with  hypokinesis of the inferior wall with a 61% ejection fraction. There was grade I  diastolic dysfunction, moderate left ventricular hypertrophy, no significant valvular  dysfunction. He was treated medically.  2016 he was seen for worsening shortness of breath and fatigue a stress echocardiogram revealed no ischemia at a good workload with no significant valvular dysfunction and with normal systolic function.    Since last visit he is walking 4-5 times a week for 2-3 miles at a time.  He denies any chest pain, shortness of breath, palpitations, syncope near syncope.  Blood pressure is usually 115/ 60s range    Past Medical History:   Diagnosis Date   • Acute bronchitis, unspecified    • Anemia    • Atherosclerotic heart disease of native  coronary artery without angina pectoris    • CAD (coronary atherosclerotic disease)    • Chest pain    • Colon polyp    • Colonic mass    • High risk medication use    • History of transfusion    • Hyperlipidemia    • Hyperplasia of prostate without urinary obstruction    • Hypertension    • Hypothyroidism (acquired)    • Mass of hepatic flexure of colon    • Myocardial infarction    • Retinal hemorrhage    • Type 2 diabetes mellitus      Past Surgical History:   Procedure Laterality Date   • CARDIAC CATHETERIZATION Left 01/27/2011    Left heart catheterization, coronary angiogarphy, left ventriculography-Dr. Sánchez Casanova   • CATARACT EXTRACTION Bilateral    • COLON RESECTION Right 6/1/2017    Procedure: LAPAROSCOPIC RIGHT COLON RESECTION;  Surgeon: Pollo Liao MD;  Location: [facility] OR;  Service:    • COLONOSCOPY N/A 01/31/2005    Colonic diverticulosis, internal hemorrhoids, and a few small colon polyps; Dr. Mushtaq Booth   • COLONOSCOPY N/A 1/24/2017    Procedure: COLONOSCOPY TO CECUM AND TERMINAL ILEUM WITH COLD BIOPSY AND HOT SNARE POLYPECTOMIES, INJECTED WITH NORMAL SALINE 3ML, INJECTED WITH SPOT INK 5ML, AND BIOPSIES;  Surgeon: Mushtaq Booth MD;  Location: [facility] ENDOSCOPY;  Service:    • COLONOSCOPY N/A 4/25/2017    Procedure: COLONOSCOPY to cecum and terminal ileum with cold polypectomy, and biopsies and tattoo (5cc) to mass at 75cm ;  Surgeon: Mushtaq Booth MD;  Location: [facility] ENDOSCOPY;  Service:    • CORONARY ARTERY BYPASS GRAFT N/A 02/08/2011    Transesophageal echo, endoscopic vein harvest, coronary artery bypass graft x5, left internal mammary graft to the LAD, vein graft to RV branch, vein graft ot distal posterior descending artery, second marginal branch of the circumflex, third marginal branch of the circumflex, temporary cardiopulmonary bypass, antegrade and retrograde cold blood cardioplegia, warm reperfusion-Dr. Brandon Bucio   • NOSE SURGERY      Laser Suite Ret Treatment Pan-Ablated  "Inferior Nasal Retina   • SHOULDER MANIPULATION Left 09/01/2004    Manipulation under anesthesia and injection of left shoulder-Dr. INA Ceja   • TONSILLECTOMY N/A      Outpatient Medications Prior to Visit   Medication Sig Dispense Refill   • amLODIPine (NORVASC) 5 MG tablet TAKE ONE-HALF TABLET BY MOUTH EVERY MORNING AND TAKE ONE TABLET BY MOUTH EVERY EVENING 45 tablet 1   • aspirin 325 MG tablet Take 325 mg by mouth Daily. HOLD PER MD INSTR-STOPPED 5/27/2017.     • atorvastatin (LIPITOR) 80 MG tablet Take one-half tablet daily NEEDS APPT 45 tablet 0   • B-D INS SYRINGE 0.5CC/31GX5/16 31G X 5/16\" 0.5 ML misc USE TWO TIMES A  each 0   • carvedilol (COREG) 25 MG tablet TAKE ONE TABLET BY MOUTH TWICE A  tablet 3   • HUMULIN 70/30 (70-30) 100 UNIT/ML injection INJECT 45 UNITS UNDER THE SKIN EVERY MORNING AND 38 UNITS UNDER THE SKIN EVERY EVENING 20 mL 10   • insulin NPH-insulin regular (novoLIN 70/30) (70-30) 100 UNIT/ML injection Inject 30 Units under the skin 2 (Two) Times a Day. 35 UNITS IN AM/33 UNITS IN PM.      • Insulin Pen Needle (BD PEN NEEDLE JUAN LUIS U/F) 32G X 4 MM misc 2 (two) times a day. Use to inject insulin     • Insulin Syringe-Needle U-100 30G X 1/2\" 0.5 ML misc      • levothyroxine (SYNTHROID, LEVOTHROID) 88 MCG tablet TAKE ONE TABLET BY MOUTH DAILY 90 tablet 1   • lisinopril (PRINIVIL,ZESTRIL) 40 MG tablet TAKE ONE TABLET BY MOUTH TWICE A DAY 60 tablet 3   • metFORMIN (GLUCOPHAGE) 1000 MG tablet TAKE ONE TABLET BY MOUTH TWICE A DAY WITH MEALS 60 tablet 0   • Omega-3 Fatty Acids (FISH OIL) 1000 MG capsule capsule Take 1,000 mg by mouth Daily With Breakfast. HOLD PER MD INSTR-STOP 5/30/2017     • levothyroxine (SYNTHROID, LEVOTHROID) 88 MCG tablet Take 88 mcg by mouth Daily.     • metFORMIN (GLUCOPHAGE) 1000 MG tablet TAKE ONE TABLET BY MOUTH TWICE A DAY WITH MEALS 60 tablet 0   • metFORMIN (GLUCOPHAGE) 1000 MG tablet TAKE ONE TABLET BY MOUTH TWICE A DAY WITH MEALS 60 tablet 0 " "    No facility-administered medications prior to visit.      Allergies as of 10/12/2017   • (No Known Allergies)     Social History     Social History   • Marital status:      Spouse name: N/A   • Number of children: N/A   • Years of education: N/A     Occupational History   • Not on file.     Social History Main Topics   • Smoking status: Former Smoker     Years: 20.00     Quit date: 3/8/1986   • Smokeless tobacco: Never Used   • Alcohol use Yes      Comment: occasionally   • Drug use: No   • Sexual activity: Defer     Other Topics Concern   • Not on file     Social History Narrative     Family History   Problem Relation Age of Onset   • Heart attack Father    • Heart disease Father    • Hypertension Other    • Malig Hyperthermia Neg Hx      Review of Systems   Constitution: Negative for fever, malaise/fatigue, weight gain and weight loss.   HENT: Negative for ear pain, hearing loss, nosebleeds and sore throat.    Eyes: Negative for double vision, pain, vision loss in left eye and vision loss in right eye.   Cardiovascular:        See history of present illness.   Respiratory: Negative for cough, shortness of breath, sleep disturbances due to breathing, snoring and wheezing.    Endocrine: Negative for cold intolerance, heat intolerance and polyuria.   Skin: Negative for itching, poor wound healing and rash.   Musculoskeletal: Negative for joint pain, joint swelling and myalgias.   Gastrointestinal: Negative for abdominal pain, diarrhea, hematochezia, nausea and vomiting.   Genitourinary: Negative for hematuria and hesitancy.   Neurological: Negative for numbness, paresthesias and seizures.   Psychiatric/Behavioral: Negative for depression. The patient is not nervous/anxious.      Objective:     Vitals:    10/12/17 0939   BP: 128/64   Pulse: 65   Weight: 218 lb 6.4 oz (99.1 kg)   Height: 71\" (180.3 cm)     Body mass index is 30.46 kg/(m^2).    Physical Exam   Constitutional: He is oriented to person, place, " and time. He appears well-developed and well-nourished.   Overweight   HENT:   Head: Normocephalic.   Nose: Nose normal.   Mouth/Throat: Oropharynx is clear and moist.   Eyes: Conjunctivae and EOM are normal. Pupils are equal, round, and reactive to light. Right eye exhibits no discharge. No scleral icterus.   Neck: Normal range of motion. Neck supple. No JVD present. No thyromegaly present.   Cardiovascular: Normal rate, regular rhythm, normal heart sounds and intact distal pulses.  Exam reveals no gallop and no friction rub.    No murmur heard.  Pulses:       Carotid pulses are 2+ on the right side, and 2+ on the left side.       Radial pulses are 2+ on the right side, and 2+ on the left side.        Femoral pulses are 2+ on the right side, and 2+ on the left side.       Popliteal pulses are 2+ on the right side, and 2+ on the left side.        Dorsalis pedis pulses are 0 on the right side, and 0 on the left side.        Posterior tibial pulses are 0 on the right side, and 0 on the left side.   Pulmonary/Chest: Effort normal and breath sounds normal. No respiratory distress. He has no wheezes. He has no rales.   Abdominal: Soft. Bowel sounds are normal. He exhibits no distension. There is no hepatosplenomegaly. There is no tenderness. There is no rebound.   Musculoskeletal: Normal range of motion. He exhibits no edema or tenderness.   Neurological: He is alert and oriented to person, place, and time.   Skin: Skin is warm and dry. No rash noted. No erythema.   Psychiatric: He has a normal mood and affect. His behavior is normal. Judgment and thought content normal.   Vitals reviewed.    Lab Review:     ECG 12 Lead  Date/Time: 10/12/2017 10:37 PM  Performed by: ANN RODNEY  Authorized by: ANN RODNEY   Comparison: compared with previous ECG   Rhythm: sinus rhythm  Conduction comments: Prior inferolateral infarction  Nonspecific ST T wave changes  QTc = 445msec  Clinical impression: abnormal ECG          Assessment:  "      Diagnosis Plan   1. Coronary arteriosclerosis in native artery  ECG 12 Lead   2. Essential hypertension  ECG 12 Lead     Plan:       1.  Coronary artery disease with history of prior myocardial infarction and coronary artery bypass grafting.  With a negative stress test in 2016.   2.  Carotid artery disease, negative carotid Doppler and March 2016  3.  Hypertension.    4.  Hyperlipidemia.  5.  Diabetes  6.  Decreased distal pulses.  ALYX checked 9/16 were normal    Coronary Artery Disease  Assessment  • The patient has no angina    Plan  • Lifestyle modifications discussed include adhering to a heart healthy diet, maintenance of a healthy weight, regular exercise and regular monitoring of cholesterol and blood pressure    Subjective - Objective  • There is a history of past MI  • There is a history of previous coronary artery bypass graft  • Current antiplatelet therapy includes aspirin 325 mg       Eric Liu   Home Medication Instructions SURESH:    Printed on:10/13/17 8452   Medication Information                      amLODIPine (NORVASC) 5 MG tablet  TAKE ONE-HALF TABLET BY MOUTH EVERY MORNING AND TAKE ONE TABLET BY MOUTH EVERY EVENING             aspirin 325 MG tablet  Take 325 mg by mouth Daily. HOLD PER MD CISSE-STOPPED 5/27/2017.             atorvastatin (LIPITOR) 80 MG tablet  Take one-half tablet daily NEEDS APPT             B-D INS SYRINGE 0.5CC/31GX5/16 31G X 5/16\" 0.5 ML misc  USE TWO TIMES A DAY             carvedilol (COREG) 25 MG tablet  TAKE ONE TABLET BY MOUTH TWICE A DAY             HUMULIN 70/30 (70-30) 100 UNIT/ML injection  INJECT 45 UNITS UNDER THE SKIN EVERY MORNING AND 38 UNITS UNDER THE SKIN EVERY EVENING             insulin NPH-insulin regular (novoLIN 70/30) (70-30) 100 UNIT/ML injection  Inject 30 Units under the skin 2 (Two) Times a Day. 35 UNITS IN AM/33 UNITS IN PM.              Insulin Pen Needle (BD PEN NEEDLE JUAN LUIS U/F) 32G X 4 MM misc  2 (two) times a day. Use to inject " "insulin             Insulin Syringe-Needle U-100 30G X 1/2\" 0.5 ML misc               levothyroxine (SYNTHROID, LEVOTHROID) 88 MCG tablet  TAKE ONE TABLET BY MOUTH DAILY             lisinopril (PRINIVIL,ZESTRIL) 40 MG tablet  TAKE ONE TABLET BY MOUTH TWICE A DAY             metFORMIN (GLUCOPHAGE) 1000 MG tablet  TAKE ONE TABLET BY MOUTH TWICE A DAY WITH MEALS             Omega-3 Fatty Acids (FISH OIL) 1000 MG capsule capsule  Take 1,000 mg by mouth Daily With Breakfast. HOLD PER MD INSTR-STOP 5/30/2017                 Dictated utilizing Dragon dictation  "

## 2017-10-19 ENCOUNTER — OFFICE VISIT (OUTPATIENT)
Dept: FAMILY MEDICINE CLINIC | Facility: CLINIC | Age: 67
End: 2017-10-19

## 2017-10-19 VITALS
HEIGHT: 71 IN | TEMPERATURE: 97.8 F | DIASTOLIC BLOOD PRESSURE: 78 MMHG | OXYGEN SATURATION: 99 % | WEIGHT: 220 LBS | HEART RATE: 60 BPM | BODY MASS INDEX: 30.8 KG/M2 | SYSTOLIC BLOOD PRESSURE: 142 MMHG

## 2017-10-19 DIAGNOSIS — I25.10 CORONARY ARTERIOSCLEROSIS IN NATIVE ARTERY: Primary | ICD-10-CM

## 2017-10-19 DIAGNOSIS — E03.9 ACQUIRED HYPOTHYROIDISM: ICD-10-CM

## 2017-10-19 DIAGNOSIS — Z79.4 CONTROLLED TYPE 2 DIABETES MELLITUS WITHOUT COMPLICATION, WITH LONG-TERM CURRENT USE OF INSULIN (HCC): ICD-10-CM

## 2017-10-19 DIAGNOSIS — E78.5 HYPERLIPIDEMIA, UNSPECIFIED HYPERLIPIDEMIA TYPE: ICD-10-CM

## 2017-10-19 DIAGNOSIS — D17.21 LIPOMA OF RIGHT UPPER EXTREMITY: ICD-10-CM

## 2017-10-19 DIAGNOSIS — Z79.899 HIGH RISK MEDICATION USE: ICD-10-CM

## 2017-10-19 DIAGNOSIS — I10 ESSENTIAL HYPERTENSION: ICD-10-CM

## 2017-10-19 DIAGNOSIS — E11.9 CONTROLLED TYPE 2 DIABETES MELLITUS WITHOUT COMPLICATION, WITH LONG-TERM CURRENT USE OF INSULIN (HCC): ICD-10-CM

## 2017-10-19 DIAGNOSIS — Z23 IMMUNIZATION DUE: ICD-10-CM

## 2017-10-19 PROCEDURE — 90732 PPSV23 VACC 2 YRS+ SUBQ/IM: CPT | Performed by: FAMILY MEDICINE

## 2017-10-19 PROCEDURE — G0008 ADMIN INFLUENZA VIRUS VAC: HCPCS | Performed by: FAMILY MEDICINE

## 2017-10-19 PROCEDURE — 99214 OFFICE O/P EST MOD 30 MIN: CPT | Performed by: FAMILY MEDICINE

## 2017-10-19 PROCEDURE — G0009 ADMIN PNEUMOCOCCAL VACCINE: HCPCS | Performed by: FAMILY MEDICINE

## 2017-10-19 PROCEDURE — 90662 IIV NO PRSV INCREASED AG IM: CPT | Performed by: FAMILY MEDICINE

## 2017-10-19 NOTE — PROGRESS NOTES
DARRYN Liu is a 67 y.o. male who is here for follow up of diabetes hypothyroidism and multiple other medical problems.  Patient did have a colon resection last June and seems to have recovered very well.  Also some polyps removed and is scheduled for repeat colonoscopy next year.  Also has follow-up appointment with Dr. Wayne at which time PSA and other tests will be rechecked.  Was recently seen by his cardiologist and said to be doing very well.      Review of Systems   Respiratory: Negative for shortness of breath.    Cardiovascular: Negative for chest pain, palpitations and leg swelling.   All other systems reviewed and are negative.        Past Medical History:   Diagnosis Date   • Acute bronchitis, unspecified    • Anemia    • Atherosclerotic heart disease of native coronary artery without angina pectoris    • CAD (coronary atherosclerotic disease)    • Chest pain    • Colon polyp    • Colonic mass    • High risk medication use    • History of transfusion    • Hyperlipidemia    • Hyperplasia of prostate without urinary obstruction    • Hypertension    • Hypothyroidism (acquired)    • Mass of hepatic flexure of colon    • Myocardial infarction    • Retinal hemorrhage    • Type 2 diabetes mellitus        Past Surgical History:   Procedure Laterality Date   • CARDIAC CATHETERIZATION Left 01/27/2011    Left heart catheterization, coronary angiogarphy, left ventriculography-Dr. Sánchez Casanova   • CATARACT EXTRACTION Bilateral    • COLON RESECTION Right 6/1/2017    Procedure: LAPAROSCOPIC RIGHT COLON RESECTION;  Surgeon: Pollo Liao MD;  Location: [facility];  Service:    • COLONOSCOPY N/A 01/31/2005    Colonic diverticulosis, internal hemorrhoids, and a few small colon polyps; Dr. Mushtaq Booth   • COLONOSCOPY N/A 1/24/2017    Procedure: COLONOSCOPY TO CECUM AND TERMINAL ILEUM WITH COLD BIOPSY AND HOT SNARE POLYPECTOMIES, INJECTED WITH NORMAL SALINE 3ML, INJECTED WITH SPOT INK 5ML, AND BIOPSIES;   Surgeon: Mushtaq Booth MD;  Location: [facility] ENDOSCOPY;  Service:    • COLONOSCOPY N/A 4/25/2017    Procedure: COLONOSCOPY to cecum and terminal ileum with cold polypectomy, and biopsies and tattoo (5cc) to mass at 75cm ;  Surgeon: Mushtaq Booth MD;  Location: [facility] ENDOSCOPY;  Service:    • CORONARY ARTERY BYPASS GRAFT N/A 02/08/2011    Transesophageal echo, endoscopic vein harvest, coronary artery bypass graft x5, left internal mammary graft to the LAD, vein graft to RV branch, vein graft ot distal posterior descending artery, second marginal branch of the circumflex, third marginal branch of the circumflex, temporary cardiopulmonary bypass, antegrade and retrograde cold blood cardioplegia, warm reperfusion-Dr. Brandon Bucio   • NOSE SURGERY      Laser Suite Ret Treatment Pan-Ablated Inferior Nasal Retina   • SHOULDER MANIPULATION Left 09/01/2004    Manipulation under anesthesia and injection of left shoulder-Dr. INA Ceja   • TONSILLECTOMY N/A        Family History   Problem Relation Age of Onset   • Heart attack Father    • Heart disease Father    • Hypertension Other    • Malig Hyperthermia Neg Hx        Social History     Social History   • Marital status:      Spouse name: N/A   • Number of children: N/A   • Years of education: N/A     Occupational History   • Not on file.     Social History Main Topics   • Smoking status: Former Smoker     Years: 20.00     Quit date: 3/8/1986   • Smokeless tobacco: Never Used   • Alcohol use Yes      Comment: occasionally   • Drug use: No   • Sexual activity: Defer     Other Topics Concern   • Not on file     Social History Narrative         Physical Exam   Constitutional: He is oriented to person, place, and time. He appears well-developed and well-nourished.   HENT:   Head: Normocephalic.   Nose: Nose normal.   Mouth/Throat: Oropharynx is clear and moist.   Eyes: Conjunctivae and EOM are normal. Pupils are equal, round, and reactive to light.   Pupils 2 mm   equal and round, denies eyedrops.  Is seen by ophthalmologist every 6 months   Neck: Normal range of motion. No JVD present. No thyromegaly present.   Cardiovascular: Normal rate and regular rhythm.    Pulmonary/Chest: Effort normal. No respiratory distress.   Abdominal: Soft. He exhibits no distension and no mass. There is no tenderness.   Musculoskeletal: He exhibits no edema or deformity.   Lymphadenopathy:     He has no cervical adenopathy.   Neurological: He is alert and oriented to person, place, and time.   Skin: Skin is warm and dry.        Psychiatric: He has a normal mood and affect. His behavior is normal. Judgment and thought content normal.   Nursing note and vitals reviewed.        Assessment/Plan    Eric was seen today for hyperlipidemia, hypertension and diabetes.    Diagnoses and all orders for this visit:    Coronary arteriosclerosis in native artery    Hyperlipidemia, unspecified hyperlipidemia type  -     Lipid Panel    Essential hypertension    Acquired hypothyroidism  -     TSH+Free T4    Controlled type 2 diabetes mellitus without complication, with long-term current use of insulin  -     Hemoglobin A1c  -     Microalbumin / Creatinine Urine Ratio - Urine, Clean Catch    High risk medication use  -     CBC & Differential  -     Comprehensive Metabolic Panel    Lipoma of right upper extremity          Patient is here for routine follow-up of above-noted medical problems.  Seems to be doing extremely well on current regimen which will be continued.  Follow-up in 6 months or as needed.  Also keep routine follow-up appointments with other specialists.    This note includes information entered using a voice recognition dictation system.  Though reviewed, some nonsensible errors may remain.

## 2017-10-20 LAB
ALBUMIN SERPL-MCNC: 4.3 G/DL (ref 3.5–5.2)
ALBUMIN/CREAT UR: 415.5 MG/G CREAT (ref 0–30)
ALBUMIN/GLOB SERPL: 1.6 G/DL
ALP SERPL-CCNC: 76 U/L (ref 39–117)
ALT SERPL-CCNC: 19 U/L (ref 1–41)
AST SERPL-CCNC: 20 U/L (ref 1–40)
BASOPHILS # BLD AUTO: 0.03 10*3/MM3 (ref 0–0.2)
BASOPHILS NFR BLD AUTO: 0.3 % (ref 0–1.5)
BILIRUB SERPL-MCNC: 0.3 MG/DL (ref 0.1–1.2)
BUN SERPL-MCNC: 18 MG/DL (ref 8–23)
BUN/CREAT SERPL: 17 (ref 7–25)
CALCIUM SERPL-MCNC: 9.1 MG/DL (ref 8.6–10.5)
CHLORIDE SERPL-SCNC: 99 MMOL/L (ref 98–107)
CHOLEST SERPL-MCNC: 133 MG/DL (ref 0–200)
CO2 SERPL-SCNC: 28.9 MMOL/L (ref 22–29)
CREAT SERPL-MCNC: 1.06 MG/DL (ref 0.76–1.27)
CREAT UR-MCNC: 164.4 MG/DL
EOSINOPHIL # BLD AUTO: 0.24 10*3/MM3 (ref 0–0.7)
EOSINOPHIL NFR BLD AUTO: 2.6 % (ref 0.3–6.2)
ERYTHROCYTE [DISTWIDTH] IN BLOOD BY AUTOMATED COUNT: 14.2 % (ref 11.5–14.5)
GFR SERPLBLD CREATININE-BSD FMLA CKD-EPI: 69 ML/MIN/1.73
GFR SERPLBLD CREATININE-BSD FMLA CKD-EPI: 84 ML/MIN/1.73
GLOBULIN SER CALC-MCNC: 2.7 GM/DL
GLUCOSE SERPL-MCNC: 156 MG/DL (ref 65–99)
HBA1C MFR BLD: 8.4 % (ref 4.8–5.6)
HCT VFR BLD AUTO: 41.4 % (ref 40.4–52.2)
HDLC SERPL-MCNC: 37 MG/DL (ref 40–60)
HGB BLD-MCNC: 12.9 G/DL (ref 13.7–17.6)
IMM GRANULOCYTES # BLD: 0 10*3/MM3 (ref 0–0.03)
IMM GRANULOCYTES NFR BLD: 0 % (ref 0–0.5)
INTERPRETATION: NORMAL
LDLC SERPL CALC-MCNC: 71 MG/DL (ref 0–100)
LYMPHOCYTES # BLD AUTO: 2.28 10*3/MM3 (ref 0.9–4.8)
LYMPHOCYTES NFR BLD AUTO: 24.4 % (ref 19.6–45.3)
MCH RBC QN AUTO: 27.3 PG (ref 27–32.7)
MCHC RBC AUTO-ENTMCNC: 31.2 G/DL (ref 32.6–36.4)
MCV RBC AUTO: 87.5 FL (ref 79.8–96.2)
MICROALBUMIN UR-MCNC: 683 UG/ML
MONOCYTES # BLD AUTO: 0.86 10*3/MM3 (ref 0.2–1.2)
MONOCYTES NFR BLD AUTO: 9.2 % (ref 5–12)
NEUTROPHILS # BLD AUTO: 5.94 10*3/MM3 (ref 1.9–8.1)
NEUTROPHILS NFR BLD AUTO: 63.5 % (ref 42.7–76)
PLATELET # BLD AUTO: 280 10*3/MM3 (ref 140–500)
POTASSIUM SERPL-SCNC: 4.1 MMOL/L (ref 3.5–5.2)
PROT SERPL-MCNC: 7 G/DL (ref 6–8.5)
RBC # BLD AUTO: 4.73 10*6/MM3 (ref 4.6–6)
SODIUM SERPL-SCNC: 143 MMOL/L (ref 136–145)
T4 FREE SERPL-MCNC: 1.4 NG/DL (ref 0.93–1.7)
TRIGL SERPL-MCNC: 126 MG/DL (ref 0–150)
TSH SERPL DL<=0.005 MIU/L-ACNC: 4.84 MIU/ML (ref 0.27–4.2)
VLDLC SERPL CALC-MCNC: 25.2 MG/DL (ref 5–40)
WBC # BLD AUTO: 9.35 10*3/MM3 (ref 4.5–10.7)

## 2017-10-23 DIAGNOSIS — Z79.4 CONTROLLED TYPE 2 DIABETES MELLITUS WITHOUT COMPLICATION, WITH LONG-TERM CURRENT USE OF INSULIN (HCC): ICD-10-CM

## 2017-10-23 DIAGNOSIS — E03.9 ACQUIRED HYPOTHYROIDISM: Primary | ICD-10-CM

## 2017-10-23 DIAGNOSIS — E11.9 CONTROLLED TYPE 2 DIABETES MELLITUS WITHOUT COMPLICATION, WITH LONG-TERM CURRENT USE OF INSULIN (HCC): ICD-10-CM

## 2017-10-23 RX ORDER — LEVOTHYROXINE SODIUM 0.1 MG/1
100 TABLET ORAL DAILY
Qty: 90 TABLET | Refills: 3 | Status: SHIPPED | OUTPATIENT
Start: 2017-10-23 | End: 2017-10-23 | Stop reason: SDUPTHER

## 2017-10-23 RX ORDER — LEVOTHYROXINE SODIUM 0.1 MG/1
100 TABLET ORAL DAILY
Qty: 90 TABLET | Refills: 3
Start: 2017-10-23 | End: 2018-04-27 | Stop reason: SDUPTHER

## 2017-11-07 NOTE — PROGRESS NOTES
CHIEF COMPLAINT:    Follow-up from colon resection    HISTORY OF PRESENT ILLNESS:    Eric Liu is a 67 y.o. male who underwent laparoscopic right hemicolectomy for an endoscopically unresectable polyp at the hepatic flexure of the ascending colon on 6/1/2017.  At his last office visit, he was still having issues with bowel movements.  They fluctuated between normal and diarrhea.  This is now resolved.      EXAM:    Alert. Oriented.  Lungs: Clear.  Heart: Regular.  Abdomen: Soft. Incisions healed. No erythema or drainage.  Extremities: Warm.    ASSESSMENT:    S/P laparoscopic right hemicolectomy on 6/1/2017 for an endoscopically unresectable polyp of the hepatic flexure (Tubulovillous adenoma 2.5 cm in size plus some smaller ones in the specimen.)      PLAN:    Given the number of polyps within the colon I have recommended follow-up colonoscopy in one year from the date of the surgery and then in 3 years subsequent to that.

## 2017-11-14 RX ORDER — SYRINGE-NEEDLE,INSULIN,0.5 ML 31 GX5/16"
SYRINGE, EMPTY DISPOSABLE MISCELLANEOUS
Qty: 100 EACH | Refills: 0 | Status: SHIPPED | OUTPATIENT
Start: 2017-11-14 | End: 2018-02-15 | Stop reason: SDUPTHER

## 2017-11-22 RX ORDER — CARVEDILOL 25 MG/1
TABLET ORAL
Qty: 180 TABLET | Refills: 0 | Status: SHIPPED | OUTPATIENT
Start: 2017-11-22 | End: 2018-01-24 | Stop reason: SDUPTHER

## 2017-12-04 RX ORDER — AMLODIPINE BESYLATE 5 MG/1
TABLET ORAL
Qty: 45 TABLET | Refills: 0 | Status: SHIPPED | OUTPATIENT
Start: 2017-12-04 | End: 2018-01-03 | Stop reason: SDUPTHER

## 2017-12-13 RX ORDER — ATORVASTATIN CALCIUM 80 MG/1
TABLET, FILM COATED ORAL
Qty: 45 TABLET | Refills: 5 | Status: SHIPPED | OUTPATIENT
Start: 2017-12-13 | End: 2018-12-13 | Stop reason: SDUPTHER

## 2018-01-04 RX ORDER — AMLODIPINE BESYLATE 5 MG/1
TABLET ORAL
Qty: 45 TABLET | Refills: 5 | Status: SHIPPED | OUTPATIENT
Start: 2018-01-04 | End: 2018-06-28 | Stop reason: SDUPTHER

## 2018-01-18 RX ORDER — LISINOPRIL 40 MG/1
TABLET ORAL
Qty: 60 TABLET | Refills: 2 | Status: SHIPPED | OUTPATIENT
Start: 2018-01-18 | End: 2018-04-19 | Stop reason: SDUPTHER

## 2018-01-24 ENCOUNTER — OFFICE VISIT (OUTPATIENT)
Dept: FAMILY MEDICINE CLINIC | Facility: CLINIC | Age: 68
End: 2018-01-24

## 2018-01-24 VITALS
DIASTOLIC BLOOD PRESSURE: 70 MMHG | TEMPERATURE: 97.8 F | OXYGEN SATURATION: 95 % | HEIGHT: 71 IN | SYSTOLIC BLOOD PRESSURE: 130 MMHG | RESPIRATION RATE: 16 BRPM | BODY MASS INDEX: 30.74 KG/M2 | WEIGHT: 219.6 LBS | HEART RATE: 59 BPM

## 2018-01-24 DIAGNOSIS — Z79.4 CONTROLLED TYPE 2 DIABETES MELLITUS WITHOUT COMPLICATION, WITH LONG-TERM CURRENT USE OF INSULIN (HCC): ICD-10-CM

## 2018-01-24 DIAGNOSIS — I10 ESSENTIAL HYPERTENSION: ICD-10-CM

## 2018-01-24 DIAGNOSIS — E11.9 CONTROLLED TYPE 2 DIABETES MELLITUS WITHOUT COMPLICATION, WITH LONG-TERM CURRENT USE OF INSULIN (HCC): ICD-10-CM

## 2018-01-24 DIAGNOSIS — Z00.00 HEALTH CARE MAINTENANCE: ICD-10-CM

## 2018-01-24 DIAGNOSIS — D64.9 ANEMIA, UNSPECIFIED TYPE: ICD-10-CM

## 2018-01-24 DIAGNOSIS — Z87.891 HISTORY OF CIGARETTE SMOKING: ICD-10-CM

## 2018-01-24 DIAGNOSIS — E03.9 ACQUIRED HYPOTHYROIDISM: ICD-10-CM

## 2018-01-24 DIAGNOSIS — Z79.899 HIGH RISK MEDICATION USE: ICD-10-CM

## 2018-01-24 DIAGNOSIS — I25.10 CORONARY ARTERIOSCLEROSIS IN NATIVE ARTERY: Primary | ICD-10-CM

## 2018-01-24 DIAGNOSIS — E78.5 HYPERLIPIDEMIA, UNSPECIFIED HYPERLIPIDEMIA TYPE: ICD-10-CM

## 2018-01-24 LAB
BASOPHILS # BLD AUTO: 0.04 10*3/MM3 (ref 0–0.2)
BASOPHILS NFR BLD AUTO: 0.5 % (ref 0–1.5)
BUN SERPL-MCNC: 17 MG/DL (ref 8–23)
BUN/CREAT SERPL: 16.8 (ref 7–25)
CALCIUM SERPL-MCNC: 9 MG/DL (ref 8.6–10.5)
CHLORIDE SERPL-SCNC: 103 MMOL/L (ref 98–107)
CO2 SERPL-SCNC: 28.8 MMOL/L (ref 22–29)
CREAT SERPL-MCNC: 1.01 MG/DL (ref 0.76–1.27)
EOSINOPHIL # BLD AUTO: 0.19 10*3/MM3 (ref 0–0.7)
EOSINOPHIL NFR BLD AUTO: 2.2 % (ref 0.3–6.2)
ERYTHROCYTE [DISTWIDTH] IN BLOOD BY AUTOMATED COUNT: 14.5 % (ref 11.5–14.5)
GFR SERPLBLD CREATININE-BSD FMLA CKD-EPI: 73 ML/MIN/1.73
GFR SERPLBLD CREATININE-BSD FMLA CKD-EPI: 89 ML/MIN/1.73
GLUCOSE SERPL-MCNC: 66 MG/DL (ref 65–99)
HBA1C MFR BLD: 7.4 % (ref 4.8–5.6)
HCT VFR BLD AUTO: 41.2 % (ref 40.4–52.2)
HGB BLD-MCNC: 13.2 G/DL (ref 13.7–17.6)
IMM GRANULOCYTES # BLD: 0 10*3/MM3 (ref 0–0.03)
IMM GRANULOCYTES NFR BLD: 0 % (ref 0–0.5)
LYMPHOCYTES # BLD AUTO: 2.46 10*3/MM3 (ref 0.9–4.8)
LYMPHOCYTES NFR BLD AUTO: 28.9 % (ref 19.6–45.3)
MCH RBC QN AUTO: 27.4 PG (ref 27–32.7)
MCHC RBC AUTO-ENTMCNC: 32 G/DL (ref 32.6–36.4)
MCV RBC AUTO: 85.5 FL (ref 79.8–96.2)
MONOCYTES # BLD AUTO: 0.73 10*3/MM3 (ref 0.2–1.2)
MONOCYTES NFR BLD AUTO: 8.6 % (ref 5–12)
NEUTROPHILS # BLD AUTO: 5.09 10*3/MM3 (ref 1.9–8.1)
NEUTROPHILS NFR BLD AUTO: 59.8 % (ref 42.7–76)
PLATELET # BLD AUTO: 290 10*3/MM3 (ref 140–500)
POTASSIUM SERPL-SCNC: 4.1 MMOL/L (ref 3.5–5.2)
RBC # BLD AUTO: 4.82 10*6/MM3 (ref 4.6–6)
SODIUM SERPL-SCNC: 145 MMOL/L (ref 136–145)
T4 FREE SERPL-MCNC: 1.47 NG/DL (ref 0.93–1.7)
TSH SERPL DL<=0.005 MIU/L-ACNC: 5.5 MIU/ML (ref 0.27–4.2)
WBC # BLD AUTO: 8.51 10*3/MM3 (ref 4.5–10.7)

## 2018-01-24 PROCEDURE — 99213 OFFICE O/P EST LOW 20 MIN: CPT | Performed by: FAMILY MEDICINE

## 2018-01-24 RX ORDER — ZOSTER VACCINE LIVE 19400 [PFU]/.65ML
INJECTION, POWDER, LYOPHILIZED, FOR SUSPENSION SUBCUTANEOUS
COMMUNITY
Start: 2017-10-24 | End: 2018-04-12

## 2018-01-24 NOTE — PROGRESS NOTES
HPI  Eric Liu is a 67 y.o. male who is here for follow up of diabetes hypertension hyperlipidemia and known coronary artery disease.  Patient seems to be doing extremely well on current regimen with no new complaints.      Review of Systems   All other systems reviewed and are negative.        Past Medical History:   Diagnosis Date   • Acute bronchitis, unspecified    • Anemia    • Atherosclerotic heart disease of native coronary artery without angina pectoris    • CAD (coronary atherosclerotic disease)    • Chest pain    • Colon polyp    • Colonic mass    • High risk medication use    • History of transfusion    • Hyperlipidemia    • Hyperplasia of prostate without urinary obstruction    • Hypertension    • Hypothyroidism (acquired)    • Mass of hepatic flexure of colon    • Myocardial infarction    • Retinal hemorrhage    • Type 2 diabetes mellitus        Past Surgical History:   Procedure Laterality Date   • CARDIAC CATHETERIZATION Left 01/27/2011    Left heart catheterization, coronary angiogarphy, left ventriculography-Dr. Sánchez Casanova   • CATARACT EXTRACTION Bilateral    • COLON RESECTION Right 6/1/2017    Procedure: LAPAROSCOPIC RIGHT COLON RESECTION;  Surgeon: Pollo Liao MD;  Location: [facility];  Service:    • COLONOSCOPY N/A 01/31/2005    Colonic diverticulosis, internal hemorrhoids, and a few small colon polyps; Dr. Mushtaq Booth   • COLONOSCOPY N/A 1/24/2017    Procedure: COLONOSCOPY TO CECUM AND TERMINAL ILEUM WITH COLD BIOPSY AND HOT SNARE POLYPECTOMIES, INJECTED WITH NORMAL SALINE 3ML, INJECTED WITH SPOT INK 5ML, AND BIOPSIES;  Surgeon: Mushtaq Booth MD;  Location: [facility] ENDOSCOPY;  Service:    • COLONOSCOPY N/A 4/25/2017    Procedure: COLONOSCOPY to cecum and terminal ileum with cold polypectomy, and biopsies and tattoo (5cc) to mass at 75cm ;  Surgeon: Mushtaq Booth MD;  Location: [facility] ENDOSCOPY;  Service:    • CORONARY ARTERY BYPASS GRAFT N/A 02/08/2011    Transesophageal echo,  endoscopic vein harvest, coronary artery bypass graft x5, left internal mammary graft to the LAD, vein graft to RV branch, vein graft ot distal posterior descending artery, second marginal branch of the circumflex, third marginal branch of the circumflex, temporary cardiopulmonary bypass, antegrade and retrograde cold blood cardioplegia, warm reperfusion-Dr. Brandon Bucio   • NOSE SURGERY      Laser Suite Ret Treatment Pan-Ablated Inferior Nasal Retina   • SHOULDER MANIPULATION Left 09/01/2004    Manipulation under anesthesia and injection of left shoulder-Dr. INA Ceja   • TONSILLECTOMY N/A        Family History   Problem Relation Age of Onset   • Heart attack Father    • Heart disease Father    • Hypertension Other    • Malig Hyperthermia Neg Hx        Social History     Social History   • Marital status:      Spouse name: N/A   • Number of children: N/A   • Years of education: N/A     Occupational History   • Not on file.     Social History Main Topics   • Smoking status: Former Smoker     Years: 20.00     Quit date: 3/8/1986   • Smokeless tobacco: Never Used   • Alcohol use Yes      Comment: occasionally   • Drug use: No   • Sexual activity: Defer     Other Topics Concern   • Not on file     Social History Narrative         Physical Exam   Constitutional: He is oriented to person, place, and time. He appears well-developed and well-nourished.   HENT:   Head: Normocephalic.   Nose: Nose normal.   Mouth/Throat: Oropharynx is clear and moist.   Eyes: Pupils are equal, round, and reactive to light. Right pupil is reactive. Left pupil is reactive.   1-2 mm pupils bilaterally?  Is not on eyedrops?   Neck: Normal range of motion. No JVD present.   Cardiovascular: Normal rate and regular rhythm.    Pulmonary/Chest: Effort normal. No respiratory distress.   Abdominal: Soft. He exhibits no distension.   Musculoskeletal: He exhibits no edema or deformity.   Neurological: He is alert and oriented to person,  place, and time. He exhibits normal muscle tone. Coordination normal.   Skin: Skin is warm and dry.   Psychiatric: He has a normal mood and affect. His behavior is normal. Judgment and thought content normal.   Nursing note and vitals reviewed.        Assessment/Plan    Eric was seen today for hypertension.    Diagnoses and all orders for this visit:    Coronary arteriosclerosis in native artery  -     Abdominal Aortic Aneurysm Screening Medicare CAR; Future    Hyperlipidemia, unspecified hyperlipidemia type    Essential hypertension  -     Basic Metabolic Panel  -     Abdominal Aortic Aneurysm Screening Medicare CAR; Future    Acquired hypothyroidism  -     TSH+Free T4    Controlled type 2 diabetes mellitus without complication, with long-term current use of insulin  -     Hemoglobin A1c  -     Abdominal Aortic Aneurysm Screening Medicare CAR; Future    High risk medication use    Anemia, unspecified type  -     CBC & Differential    Health care maintenance  -     Abdominal Aortic Aneurysm Screening Medicare CAR; Future    History of cigarette smoking  -     Abdominal Aortic Aneurysm Screening Medicare CAR; Future      Patient is here for routine follow-up of above-noted medical problems.  Seems to be doing extremely well on current regimen which will be continued including follow-up every 3 months.    This note includes information entered using a voice recognition dictation system.  Though reviewed, some nonsensible errors may remain.

## 2018-01-25 DIAGNOSIS — Z00.00 HEALTH CARE MAINTENANCE: ICD-10-CM

## 2018-01-25 DIAGNOSIS — R09.89 DECREASED PEDAL PULSES: ICD-10-CM

## 2018-01-25 DIAGNOSIS — Z79.4 CONTROLLED TYPE 2 DIABETES MELLITUS WITHOUT COMPLICATION, WITH LONG-TERM CURRENT USE OF INSULIN (HCC): ICD-10-CM

## 2018-01-25 DIAGNOSIS — I10 ESSENTIAL HYPERTENSION: Primary | ICD-10-CM

## 2018-01-25 DIAGNOSIS — Z87.891 EX-CIGARETTE SMOKER: ICD-10-CM

## 2018-01-25 DIAGNOSIS — E11.9 CONTROLLED TYPE 2 DIABETES MELLITUS WITHOUT COMPLICATION, WITH LONG-TERM CURRENT USE OF INSULIN (HCC): ICD-10-CM

## 2018-01-26 DIAGNOSIS — Z13.6 SCREENING FOR ISCHEMIC HEART DISEASE: ICD-10-CM

## 2018-01-26 DIAGNOSIS — I10 ESSENTIAL HYPERTENSION: ICD-10-CM

## 2018-01-26 DIAGNOSIS — Z87.891 FORMER CIGARETTE SMOKER: ICD-10-CM

## 2018-01-26 DIAGNOSIS — Z87.891 HISTORY OF CIGARETTE SMOKING: ICD-10-CM

## 2018-01-26 DIAGNOSIS — R09.89 DECREASED DORSALIS PEDIS PULSE: Primary | ICD-10-CM

## 2018-01-26 DIAGNOSIS — I70.90 ASVD (ARTERIOSCLEROTIC VASCULAR DISEASE): ICD-10-CM

## 2018-01-29 DIAGNOSIS — Z87.891 PERSONAL HISTORY OF SMOKING: ICD-10-CM

## 2018-01-29 DIAGNOSIS — Z13.6 SCREENING FOR ABDOMINAL AORTIC ANEURYSM: Primary | ICD-10-CM

## 2018-01-30 ENCOUNTER — APPOINTMENT (OUTPATIENT)
Dept: ULTRASOUND IMAGING | Facility: HOSPITAL | Age: 68
End: 2018-01-30
Attending: FAMILY MEDICINE

## 2018-02-06 ENCOUNTER — HOSPITAL ENCOUNTER (OUTPATIENT)
Dept: ULTRASOUND IMAGING | Facility: HOSPITAL | Age: 68
Discharge: HOME | End: 2018-02-06
Attending: FAMILY MEDICINE | Admitting: FAMILY MEDICINE

## 2018-02-06 DIAGNOSIS — Z87.891 PERSONAL HISTORY OF SMOKING: ICD-10-CM

## 2018-02-06 DIAGNOSIS — Z13.6 SCREENING FOR ABDOMINAL AORTIC ANEURYSM: ICD-10-CM

## 2018-02-06 PROCEDURE — 76706 US ABDL AORTA SCREEN AAA: CPT

## 2018-02-15 RX ORDER — SYRINGE-NEEDLE,INSULIN,0.5 ML 31 GX5/16"
SYRINGE, EMPTY DISPOSABLE MISCELLANEOUS
Qty: 100 EACH | Refills: 3 | Status: SHIPPED | OUTPATIENT
Start: 2018-02-15 | End: 2018-12-17 | Stop reason: SDUPTHER

## 2018-04-12 ENCOUNTER — OFFICE VISIT (OUTPATIENT)
Dept: CARDIOLOGY | Facility: CLINIC | Age: 68
End: 2018-04-12

## 2018-04-12 VITALS
HEIGHT: 71 IN | HEART RATE: 61 BPM | SYSTOLIC BLOOD PRESSURE: 122 MMHG | DIASTOLIC BLOOD PRESSURE: 62 MMHG | WEIGHT: 221 LBS | BODY MASS INDEX: 30.94 KG/M2

## 2018-04-12 DIAGNOSIS — Z79.4 CONTROLLED TYPE 2 DIABETES MELLITUS WITHOUT COMPLICATION, WITH LONG-TERM CURRENT USE OF INSULIN (HCC): ICD-10-CM

## 2018-04-12 DIAGNOSIS — I10 ESSENTIAL HYPERTENSION: ICD-10-CM

## 2018-04-12 DIAGNOSIS — E11.9 CONTROLLED TYPE 2 DIABETES MELLITUS WITHOUT COMPLICATION, WITH LONG-TERM CURRENT USE OF INSULIN (HCC): ICD-10-CM

## 2018-04-12 DIAGNOSIS — I25.10 CORONARY ARTERIOSCLEROSIS IN NATIVE ARTERY: ICD-10-CM

## 2018-04-12 DIAGNOSIS — I25.810 CORONARY ARTERY DISEASE INVOLVING CORONARY BYPASS GRAFT OF NATIVE HEART WITHOUT ANGINA PECTORIS: Primary | ICD-10-CM

## 2018-04-12 PROCEDURE — 99213 OFFICE O/P EST LOW 20 MIN: CPT | Performed by: INTERNAL MEDICINE

## 2018-04-12 PROCEDURE — 93000 ELECTROCARDIOGRAM COMPLETE: CPT | Performed by: INTERNAL MEDICINE

## 2018-04-12 NOTE — PROGRESS NOTES
Date of Office Visit: 2018  Encounter Provider: Sharri Garcia MD  Place of Service: [facility] CARDIOLOGY  Patient Name: Eric Liu  :1950    Chief complaint  Follow-up of coronary artery disease, carotid artery disease    History of Present Illness  The patient is a pleasant 67-year-old gentleman with diabetes, hypertension,  hyperlipidemia, who has a history of coronary artery bypass grafting in  with prior  inferior wall infarction. He has a normal systolic function. He underwent coronary artery  bypass grafting with a LIMA graft to the LAD, vein graft to the RV branch, as well as a  vein graft to the distal PDA and second obtuse marginal branch, circumflex artery and  third obtuse marginal branch. He had postoperative fluid retention that resolved with  diuretics. He has had intermittent chest pain since then and has had a couple of stress  tests since then. His last stress test in 2013 revealed a small inferior wall  infarction with minimal amount of richard-infarct ischemia. His ejection fraction was felt to  be possibly reduced at 45%. It was unchanged from his prior study a year earlier. However, he had an echocardiogram that revealed normal left ventricular size and function with hypokinesis of the inferior wall with a 61% ejection fraction. There was grade I diastolic dysfunction, moderate left ventricular hypertrophy, no significant valvular  dysfunction. He was treated medically.  2016 he was seen for worsening shortness of breath and fatigue a stress echocardiogram revealed no ischemia at a good workload with no significant valvular dysfunction and with normal systolic function.    Since the last visIt he is walking 3 miles 3 times a week. He denies chest pain, shortness of breath, palpitatIons or miladys    Past Medical History:   Diagnosis Date   • Acute bronchitis, unspecified    • Anemia    • Atherosclerotic heart disease of native coronary artery  without angina pectoris    • CAD (coronary atherosclerotic disease)    • Chest pain    • Colon polyp    • Colonic mass    • High risk medication use    • History of transfusion    • Hyperlipidemia    • Hyperplasia of prostate without urinary obstruction    • Hypertension    • Hypothyroidism (acquired)    • Mass of hepatic flexure of colon    • Myocardial infarction    • Retinal hemorrhage    • Type 2 diabetes mellitus      Past Surgical History:   Procedure Laterality Date   • CARDIAC CATHETERIZATION Left 01/27/2011    Left heart catheterization, coronary angiogarphy, left ventriculography-Dr. Sánchez Casanova   • CATARACT EXTRACTION Bilateral    • COLON RESECTION Right 6/1/2017    Procedure: LAPAROSCOPIC RIGHT COLON RESECTION;  Surgeon: Pollo Liao MD;  Location: [facility] OR;  Service:    • COLONOSCOPY N/A 01/31/2005    Colonic diverticulosis, internal hemorrhoids, and a few small colon polyps; Dr. Mushtaq Booth   • COLONOSCOPY N/A 1/24/2017    Procedure: COLONOSCOPY TO CECUM AND TERMINAL ILEUM WITH COLD BIOPSY AND HOT SNARE POLYPECTOMIES, INJECTED WITH NORMAL SALINE 3ML, INJECTED WITH SPOT INK 5ML, AND BIOPSIES;  Surgeon: Mushtaq Booth MD;  Location: [facility] ENDOSCOPY;  Service:    • COLONOSCOPY N/A 4/25/2017    Procedure: COLONOSCOPY to cecum and terminal ileum with cold polypectomy, and biopsies and tattoo (5cc) to mass at 75cm ;  Surgeon: Mushtaq Booth MD;  Location: [facility] ENDOSCOPY;  Service:    • CORONARY ARTERY BYPASS GRAFT N/A 02/08/2011    Transesophageal echo, endoscopic vein harvest, coronary artery bypass graft x5, left internal mammary graft to the LAD, vein graft to RV branch, vein graft ot distal posterior descending artery, second marginal branch of the circumflex, third marginal branch of the circumflex, temporary cardiopulmonary bypass, antegrade and retrograde cold blood cardioplegia, warm reperfusion-Dr. Brandon Bucio   • NOSE SURGERY      Laser Suite Ret Treatment Pan-Ablated Inferior Nasal  "Retina   • SHOULDER MANIPULATION Left 09/01/2004    Manipulation under anesthesia and injection of left shoulder-Dr. INA Ceja   • TONSILLECTOMY N/A        Current Outpatient Prescriptions:   •  amLODIPine (NORVASC) 5 MG tablet, TAKE ONE-HALF TABLET BY MOUTH EVERY MORNING AND TAKE ONE TABLET BY MOUTH EVERY EVENING, Disp: 45 tablet, Rfl: 5  •  aspirin 325 MG tablet, Take 325 mg by mouth Daily. HOLD PER MD INSTR-STOPPED 5/27/2017., Disp: , Rfl:   •  atorvastatin (LIPITOR) 80 MG tablet, TAKE ONE-HALF TABLET BY MOUTH DAILY .  NEED APPOINTMENT, Disp: 45 tablet, Rfl: 5  •  B-D INS SYRINGE 0.5CC/31GX5/16 31G X 5/16\" 0.5 ML misc, USE TWO TIMES A DAY, Disp: 100 each, Rfl: 3  •  carvedilol (COREG) 25 MG tablet, TAKE ONE TABLET BY MOUTH TWICE A DAY, Disp: 180 tablet, Rfl: 3  •  insulin NPH-insulin regular (HUMULIN 70/30) (70-30) 100 UNIT/ML injection, 40 units every morning and 35 units every afternoon, Disp: 20 mL, Rfl: 10  •  Insulin Pen Needle (BD PEN NEEDLE JUAN LUIS U/F) 32G X 4 MM misc, 2 (two) times a day. Use to inject insulin, Disp: , Rfl:   •  Insulin Syringe-Needle U-100 30G X 1/2\" 0.5 ML misc, , Disp: , Rfl:   •  levothyroxine (SYNTHROID, LEVOTHROID) 100 MCG tablet, Take 1 tablet by mouth Daily., Disp: 90 tablet, Rfl: 3  •  lisinopril (PRINIVIL,ZESTRIL) 40 MG tablet, TAKE ONE TABLET BY MOUTH TWICE A DAY, Disp: 60 tablet, Rfl: 2  •  metFORMIN (GLUCOPHAGE) 1000 MG tablet, TAKE ONE TABLET BY MOUTH TWICE A DAY WITH MEALS, Disp: 60 tablet, Rfl: 5  •  Omega-3 Fatty Acids (FISH OIL) 1000 MG capsule capsule, Take 1,000 mg by mouth Daily With Breakfast. HOLD PER MD INSTR-STOP 5/30/2017, Disp: , Rfl:       Allergies as of 04/12/2018   • (No Known Allergies)     Social History     Social History   • Marital status:      Spouse name: N/A   • Number of children: N/A   • Years of education: N/A     Occupational History   • Not on file.     Social History Main Topics   • Smoking status: Former Smoker     Years: 20.00    " " Quit date: 3/8/1986   • Smokeless tobacco: Never Used   • Alcohol use Yes      Comment: occasionally   • Drug use: No   • Sexual activity: Defer     Other Topics Concern   • Not on file     Social History Narrative   • No narrative on file     Family History   Problem Relation Age of Onset   • Heart attack Father    • Heart disease Father    • Hypertension Other    • Malig Hyperthermia Neg Hx      Review of Systems   Constitution: Negative for fever, malaise/fatigue, weight gain and weight loss.   HENT: Negative for ear pain, hearing loss, nosebleeds and sore throat.    Eyes: Negative for double vision, pain, vision loss in left eye and vision loss in right eye.   Cardiovascular:        See history of present illness.   Respiratory: Negative for cough, shortness of breath, sleep disturbances due to breathing, snoring and wheezing.    Endocrine: Negative for cold intolerance, heat intolerance and polyuria.   Skin: Negative for itching, poor wound healing and rash.   Musculoskeletal: Negative for joint pain, joint swelling and myalgias.   Gastrointestinal: Negative for abdominal pain, diarrhea, hematochezia, nausea and vomiting.   Genitourinary: Negative for hematuria and hesitancy.   Neurological: Negative for numbness, paresthesias and seizures.   Psychiatric/Behavioral: Negative for depression. The patient is not nervous/anxious.         Objective:     Vitals:    04/12/18 0950   BP: 122/62   Pulse: 61   Weight: 100 kg (221 lb)   Height: 180.3 cm (71\")     Body mass index is 30.82 kg/m².    Physical Exam   Constitutional: He is oriented to person, place, and time. He appears well-developed and well-nourished.   Overweight   HENT:   Head: Normocephalic.   Nose: Nose normal.   Mouth/Throat: Oropharynx is clear and moist.   Eyes: Conjunctivae and EOM are normal. Pupils are equal, round, and reactive to light. Right eye exhibits no discharge. No scleral icterus.   Neck: Normal range of motion. Neck supple. No JVD " present. No thyromegaly present.   Cardiovascular: Normal rate, regular rhythm, normal heart sounds and intact distal pulses.  Exam reveals no gallop and no friction rub.    No murmur heard.  Pulses:       Carotid pulses are 2+ on the right side, and 2+ on the left side.       Radial pulses are 2+ on the right side, and 2+ on the left side.        Femoral pulses are 2+ on the right side, and 2+ on the left side.       Popliteal pulses are 2+ on the right side, and 2+ on the left side.        Dorsalis pedis pulses are 2+ on the right side, and 2+ on the left side.        Posterior tibial pulses are 2+ on the right side, and 2+ on the left side.   Trace edema bilaterally   Pulmonary/Chest: Effort normal and breath sounds normal. No respiratory distress. He has no wheezes. He has no rales.   Abdominal: Soft. Bowel sounds are normal. He exhibits no distension. There is no hepatosplenomegaly. There is no tenderness. There is no rebound.   Musculoskeletal: Normal range of motion. He exhibits no edema or tenderness.   Neurological: He is alert and oriented to person, place, and time.   Skin: Skin is warm and dry. No rash noted. No erythema.   Psychiatric: He has a normal mood and affect. His behavior is normal. Judgment and thought content normal.   Vitals reviewed.    Lab Review:     ECG 12 Lead  Date/Time: 4/12/2018 10:10 AM  Performed by: ANN RODNEY  Authorized by: ANN RODNEY   Comparison: compared with previous ECG   Similar to previous ECG  Rhythm: sinus rhythm  Conduction: complete RBBB and 1st degree  Conduction comments: Prior inferolateral infarction            Assessment:       Diagnosis Plan   1. Coronary artery disease involving coronary bypass graft of native heart without angina pectoris  ECG 12 Lead   2. Coronary arteriosclerosis in native artery     3. Essential hypertension     4. Controlled type 2 diabetes mellitus without complication, with long-term current use of insulin       Plan:       1.   "Coronary artery disease with history of prior myocardial infarction and coronary artery bypass grafting.  With a negative stress test in 2016.  No anginal symptoms.   2.  Carotid artery disease, negative carotid Doppler and March 2016  3.  Hypertension.  Controlled  4.  Hyperlipidemia.  5.  Diabetes    Coronary Artery Disease  Assessment  • The patient has no angina    Plan  • Lifestyle modifications discussed include adhering to a heart healthy diet, maintenance of a healthy weight, regular exercise and regular monitoring of cholesterol and blood pressure    Subjective - Objective  • There is a history of past MI  • There is a history of previous coronary artery bypass graft  • Current antiplatelet therapy includes aspirin 325 mg       Eric Liu   Home Medication Instructions SURESH:    Printed on:04/15/18 4890   Medication Information                      amLODIPine (NORVASC) 5 MG tablet  TAKE ONE-HALF TABLET BY MOUTH EVERY MORNING AND TAKE ONE TABLET BY MOUTH EVERY EVENING             aspirin 325 MG tablet  Take 325 mg by mouth Daily. HOLD PER MD CISSE-STOPPED 5/27/2017.             atorvastatin (LIPITOR) 80 MG tablet  TAKE ONE-HALF TABLET BY MOUTH DAILY .  NEED APPOINTMENT             B-D INS SYRINGE 0.5CC/31GX5/16 31G X 5/16\" 0.5 ML misc  USE TWO TIMES A DAY             carvedilol (COREG) 25 MG tablet  TAKE ONE TABLET BY MOUTH TWICE A DAY             insulin NPH-insulin regular (HUMULIN 70/30) (70-30) 100 UNIT/ML injection  40 units every morning and 35 units every afternoon             Insulin Pen Needle (BD PEN NEEDLE JUAN LUIS U/F) 32G X 4 MM misc  2 (two) times a day. Use to inject insulin             Insulin Syringe-Needle U-100 30G X 1/2\" 0.5 ML misc               levothyroxine (SYNTHROID, LEVOTHROID) 100 MCG tablet  Take 1 tablet by mouth Daily.             lisinopril (PRINIVIL,ZESTRIL) 40 MG tablet  TAKE ONE TABLET BY MOUTH TWICE A DAY             metFORMIN (GLUCOPHAGE) 1000 MG tablet  TAKE ONE TABLET BY " MOUTH TWICE A DAY WITH MEALS             Omega-3 Fatty Acids (FISH OIL) 1000 MG capsule capsule  Take 1,000 mg by mouth Daily With Breakfast. HOLD PER MD INSTR-STOP 5/30/2017                 No orders of the defined types were placed in this encounter.      Dictated utilizing Dragon dictation

## 2018-04-19 RX ORDER — LISINOPRIL 40 MG/1
TABLET ORAL
Qty: 60 TABLET | Refills: 5 | Status: SHIPPED | OUTPATIENT
Start: 2018-04-19 | End: 2018-10-17 | Stop reason: SDUPTHER

## 2018-04-24 ENCOUNTER — OFFICE VISIT (OUTPATIENT)
Dept: FAMILY MEDICINE CLINIC | Facility: CLINIC | Age: 68
End: 2018-04-24

## 2018-04-24 VITALS
TEMPERATURE: 98 F | SYSTOLIC BLOOD PRESSURE: 124 MMHG | RESPIRATION RATE: 16 BRPM | WEIGHT: 219.8 LBS | HEIGHT: 71 IN | HEART RATE: 62 BPM | DIASTOLIC BLOOD PRESSURE: 74 MMHG | BODY MASS INDEX: 30.77 KG/M2 | OXYGEN SATURATION: 100 %

## 2018-04-24 DIAGNOSIS — D64.9 ANEMIA, UNSPECIFIED TYPE: ICD-10-CM

## 2018-04-24 DIAGNOSIS — E11.9 CONTROLLED TYPE 2 DIABETES MELLITUS WITHOUT COMPLICATION, WITH LONG-TERM CURRENT USE OF INSULIN (HCC): ICD-10-CM

## 2018-04-24 DIAGNOSIS — E03.9 ACQUIRED HYPOTHYROIDISM: ICD-10-CM

## 2018-04-24 DIAGNOSIS — Z79.899 HIGH RISK MEDICATION USE: ICD-10-CM

## 2018-04-24 DIAGNOSIS — E78.5 HYPERLIPIDEMIA, UNSPECIFIED HYPERLIPIDEMIA TYPE: ICD-10-CM

## 2018-04-24 DIAGNOSIS — I25.10 CORONARY ARTERIOSCLEROSIS IN NATIVE ARTERY: Primary | ICD-10-CM

## 2018-04-24 DIAGNOSIS — I10 ESSENTIAL HYPERTENSION: ICD-10-CM

## 2018-04-24 DIAGNOSIS — Z79.4 CONTROLLED TYPE 2 DIABETES MELLITUS WITHOUT COMPLICATION, WITH LONG-TERM CURRENT USE OF INSULIN (HCC): ICD-10-CM

## 2018-04-24 LAB
ALBUMIN SERPL-MCNC: 4.3 G/DL (ref 3.5–5.2)
ALBUMIN/GLOB SERPL: 1.7 G/DL
ALP SERPL-CCNC: 69 U/L (ref 39–117)
ALT SERPL-CCNC: 18 U/L (ref 1–41)
AST SERPL-CCNC: 20 U/L (ref 1–40)
BASOPHILS # BLD AUTO: 0.03 10*3/MM3 (ref 0–0.2)
BASOPHILS NFR BLD AUTO: 0.3 % (ref 0–1.5)
BILIRUB SERPL-MCNC: 0.4 MG/DL (ref 0.1–1.2)
BUN SERPL-MCNC: 19 MG/DL (ref 8–23)
BUN/CREAT SERPL: 18.1 (ref 7–25)
CALCIUM SERPL-MCNC: 9.2 MG/DL (ref 8.6–10.5)
CHLORIDE SERPL-SCNC: 102 MMOL/L (ref 98–107)
CHOLEST SERPL-MCNC: 137 MG/DL (ref 0–200)
CO2 SERPL-SCNC: 31.9 MMOL/L (ref 22–29)
CREAT SERPL-MCNC: 1.05 MG/DL (ref 0.76–1.27)
EOSINOPHIL # BLD AUTO: 0.15 10*3/MM3 (ref 0–0.7)
EOSINOPHIL NFR BLD AUTO: 1.5 % (ref 0.3–6.2)
ERYTHROCYTE [DISTWIDTH] IN BLOOD BY AUTOMATED COUNT: 14.7 % (ref 11.5–14.5)
GFR SERPLBLD CREATININE-BSD FMLA CKD-EPI: 70 ML/MIN/1.73
GFR SERPLBLD CREATININE-BSD FMLA CKD-EPI: 85 ML/MIN/1.73
GLOBULIN SER CALC-MCNC: 2.5 GM/DL
GLUCOSE SERPL-MCNC: 81 MG/DL (ref 65–99)
HBA1C MFR BLD: 7.9 % (ref 4.8–5.6)
HCT VFR BLD AUTO: 41.6 % (ref 40.4–52.2)
HDLC SERPL-MCNC: 40 MG/DL (ref 40–60)
HGB BLD-MCNC: 13.3 G/DL (ref 13.7–17.6)
IMM GRANULOCYTES # BLD: 0.01 10*3/MM3 (ref 0–0.03)
IMM GRANULOCYTES NFR BLD: 0.1 % (ref 0–0.5)
LDLC SERPL CALC-MCNC: 77 MG/DL (ref 0–100)
LYMPHOCYTES # BLD AUTO: 2.1 10*3/MM3 (ref 0.9–4.8)
LYMPHOCYTES NFR BLD AUTO: 20.9 % (ref 19.6–45.3)
MCH RBC QN AUTO: 27.5 PG (ref 27–32.7)
MCHC RBC AUTO-ENTMCNC: 32 G/DL (ref 32.6–36.4)
MCV RBC AUTO: 86.1 FL (ref 79.8–96.2)
MONOCYTES # BLD AUTO: 0.6 10*3/MM3 (ref 0.2–1.2)
MONOCYTES NFR BLD AUTO: 6 % (ref 5–12)
NEUTROPHILS # BLD AUTO: 7.18 10*3/MM3 (ref 1.9–8.1)
NEUTROPHILS NFR BLD AUTO: 71.3 % (ref 42.7–76)
PLATELET # BLD AUTO: 288 10*3/MM3 (ref 140–500)
POTASSIUM SERPL-SCNC: 4 MMOL/L (ref 3.5–5.2)
PROT SERPL-MCNC: 6.8 G/DL (ref 6–8.5)
RBC # BLD AUTO: 4.83 10*6/MM3 (ref 4.6–6)
SODIUM SERPL-SCNC: 145 MMOL/L (ref 136–145)
T4 FREE SERPL-MCNC: 1.47 NG/DL (ref 0.93–1.7)
TRIGL SERPL-MCNC: 101 MG/DL (ref 0–150)
TSH SERPL DL<=0.005 MIU/L-ACNC: 4.28 MIU/ML (ref 0.27–4.2)
VLDLC SERPL CALC-MCNC: 20.2 MG/DL (ref 5–40)
WBC # BLD AUTO: 10.06 10*3/MM3 (ref 4.5–10.7)

## 2018-04-24 PROCEDURE — 99213 OFFICE O/P EST LOW 20 MIN: CPT | Performed by: FAMILY MEDICINE

## 2018-04-27 DIAGNOSIS — E03.9 ACQUIRED HYPOTHYROIDISM: ICD-10-CM

## 2018-04-27 RX ORDER — LEVOTHYROXINE SODIUM 112 UG/1
112 TABLET ORAL DAILY
Qty: 90 TABLET | Refills: 3
Start: 2018-04-27 | End: 2018-04-30 | Stop reason: SDUPTHER

## 2018-04-30 DIAGNOSIS — E03.9 ACQUIRED HYPOTHYROIDISM: ICD-10-CM

## 2018-04-30 RX ORDER — LEVOTHYROXINE SODIUM 112 UG/1
112 TABLET ORAL DAILY
Qty: 90 TABLET | Refills: 3 | Status: SHIPPED | OUTPATIENT
Start: 2018-04-30 | End: 2018-12-19 | Stop reason: SDUPTHER

## 2018-05-07 ENCOUNTER — PREP FOR SURGERY (OUTPATIENT)
Dept: OTHER | Facility: HOSPITAL | Age: 68
End: 2018-05-07

## 2018-05-07 DIAGNOSIS — Z86.010 HISTORY OF COLONIC POLYPS: Primary | ICD-10-CM

## 2018-05-09 PROBLEM — Z86.0100 HISTORY OF COLONIC POLYPS: Status: ACTIVE | Noted: 2018-05-09

## 2018-05-09 PROBLEM — Z86.010 HISTORY OF COLONIC POLYPS: Status: ACTIVE | Noted: 2018-05-09

## 2018-06-05 ENCOUNTER — TELEPHONE (OUTPATIENT)
Dept: SURGERY | Facility: CLINIC | Age: 68
End: 2018-06-05

## 2018-06-05 ENCOUNTER — HOSPITAL ENCOUNTER (EMERGENCY)
Facility: HOSPITAL | Age: 68
Discharge: LEFT BEFORE DISCHARGE | End: 2018-06-05

## 2018-06-05 VITALS
TEMPERATURE: 96 F | SYSTOLIC BLOOD PRESSURE: 157 MMHG | WEIGHT: 220 LBS | HEART RATE: 64 BPM | DIASTOLIC BLOOD PRESSURE: 86 MMHG | OXYGEN SATURATION: 95 % | RESPIRATION RATE: 18 BRPM | HEIGHT: 71 IN | BODY MASS INDEX: 30.8 KG/M2

## 2018-06-05 LAB
GLUCOSE BLDC GLUCOMTR-MCNC: 242 MG/DL (ref 70–130)
GLUCOSE BLDC GLUCOMTR-MCNC: 74 MG/DL (ref 70–130)

## 2018-06-05 PROCEDURE — 82962 GLUCOSE BLOOD TEST: CPT

## 2018-06-05 PROCEDURE — 99211 OFF/OP EST MAY X REQ PHY/QHP: CPT

## 2018-06-05 NOTE — TELEPHONE ENCOUNTER
Pt c-scope 6/6 he takes insulin what should he do with it today while prepping? Should he not take it? Do 1/2?

## 2018-06-06 ENCOUNTER — ANESTHESIA EVENT (OUTPATIENT)
Dept: GASTROENTEROLOGY | Facility: HOSPITAL | Age: 68
End: 2018-06-06

## 2018-06-06 ENCOUNTER — ANESTHESIA (OUTPATIENT)
Dept: GASTROENTEROLOGY | Facility: HOSPITAL | Age: 68
End: 2018-06-06

## 2018-06-06 ENCOUNTER — HOSPITAL ENCOUNTER (OUTPATIENT)
Facility: HOSPITAL | Age: 68
Setting detail: HOSPITAL OUTPATIENT SURGERY
Discharge: HOME | End: 2018-06-06
Attending: SURGERY | Admitting: SURGERY

## 2018-06-06 VITALS
TEMPERATURE: 98.1 F | DIASTOLIC BLOOD PRESSURE: 78 MMHG | HEART RATE: 60 BPM | SYSTOLIC BLOOD PRESSURE: 151 MMHG | HEIGHT: 71 IN | OXYGEN SATURATION: 94 % | BODY MASS INDEX: 30.38 KG/M2 | WEIGHT: 217 LBS | RESPIRATION RATE: 16 BRPM

## 2018-06-06 LAB — GLUCOSE BLDC GLUCOMTR-MCNC: 140 MG/DL (ref 70–130)

## 2018-06-06 PROCEDURE — S0260 H&P FOR SURGERY: HCPCS | Performed by: SURGERY

## 2018-06-06 PROCEDURE — 82962 GLUCOSE BLOOD TEST: CPT

## 2018-06-06 PROCEDURE — G0105 COLORECTAL SCRN; HI RISK IND: HCPCS | Performed by: SURGERY

## 2018-06-06 PROCEDURE — 25010000002 PROPOFOL 10 MG/ML EMULSION: Performed by: ANESTHESIOLOGY

## 2018-06-06 RX ORDER — SODIUM CHLORIDE, SODIUM LACTATE, POTASSIUM CHLORIDE, CALCIUM CHLORIDE 600; 310; 30; 20 MG/100ML; MG/100ML; MG/100ML; MG/100ML
1000 INJECTION, SOLUTION INTRAVENOUS CONTINUOUS
Status: DISCONTINUED | OUTPATIENT
Start: 2018-06-06 | End: 2018-06-06 | Stop reason: HOSPADM

## 2018-06-06 RX ORDER — LIDOCAINE HYDROCHLORIDE 20 MG/ML
INJECTION, SOLUTION INFILTRATION; PERINEURAL AS NEEDED
Status: DISCONTINUED | OUTPATIENT
Start: 2018-06-06 | End: 2018-06-06 | Stop reason: SURG

## 2018-06-06 RX ORDER — SODIUM CHLORIDE 0.9 % (FLUSH) 0.9 %
3 SYRINGE (ML) INJECTION AS NEEDED
Status: DISCONTINUED | OUTPATIENT
Start: 2018-06-06 | End: 2018-06-06 | Stop reason: HOSPADM

## 2018-06-06 RX ORDER — PROPOFOL 10 MG/ML
VIAL (ML) INTRAVENOUS AS NEEDED
Status: DISCONTINUED | OUTPATIENT
Start: 2018-06-06 | End: 2018-06-06 | Stop reason: SURG

## 2018-06-06 RX ORDER — LIDOCAINE HYDROCHLORIDE 10 MG/ML
0.5 INJECTION, SOLUTION INFILTRATION; PERINEURAL ONCE AS NEEDED
Status: DISCONTINUED | OUTPATIENT
Start: 2018-06-06 | End: 2018-06-06 | Stop reason: HOSPADM

## 2018-06-06 RX ADMIN — LIDOCAINE HYDROCHLORIDE 50 MG: 20 INJECTION, SOLUTION INFILTRATION; PERINEURAL at 10:51

## 2018-06-06 RX ADMIN — PROPOFOL 200 MG: 10 INJECTION, EMULSION INTRAVENOUS at 10:51

## 2018-06-06 RX ADMIN — PROPOFOL 200 MG: 10 INJECTION, EMULSION INTRAVENOUS at 11:10

## 2018-06-06 RX ADMIN — SODIUM CHLORIDE, POTASSIUM CHLORIDE, SODIUM LACTATE AND CALCIUM CHLORIDE 1000 ML: 600; 310; 30; 20 INJECTION, SOLUTION INTRAVENOUS at 10:04

## 2018-06-06 NOTE — ANESTHESIA POSTPROCEDURE EVALUATION
Patient: Eric Liu    Procedure Summary     Date:  06/06/18 Room / Location:  [facility] ENDOSCOPY 10 /  [facility]    Anesthesia Start:  1051 Anesthesia Stop:  1114    Procedure:  COLONOSCOPY TO ANASTOMOSIS AND INTO TERMINAL ILEUM (N/A ) Diagnosis:       History of colonic polyps      (History of colonic polyps [Z86.010])    Surgeon:  Pollo Liao MD Provider:  Mana Rangel MD    Anesthesia Type:  MAC ASA Status:  3          Anesthesia Type: MAC  Last vitals  BP   135/71 (06/06/18 1125)   Temp   36.7 °C (98.1 °F) (06/06/18 1125)   Pulse   59 (06/06/18 1125)   Resp   16 (06/06/18 1125)     SpO2   95 % (06/06/18 1125)     Post Anesthesia Care and Evaluation    Patient location during evaluation: PACU  Patient participation: complete - patient participated  Level of consciousness: awake and alert  Pain management: adequate  Airway patency: patent  Anesthetic complications: No anesthetic complications  PONV Status: none  Cardiovascular status: acceptable  Respiratory status: acceptable  Hydration status: acceptable

## 2018-06-06 NOTE — ED NOTES
"Pt has no complaints. States he feels better post eating. Reports, \"we know what the problem was, my sugar dropped because I took insulin while I was fasting.\" Pt appears competent in NAD. Ambulates in waiting room without noted difficulty.      Sridevi Mancuso RN  06/05/18 2075    "

## 2018-06-06 NOTE — ED NOTES
Provided the pt with turkey sandwich and potato chips after discussing current blood glucose with ALEYDA Elias Dandy  06/05/18 9207

## 2018-06-06 NOTE — H&P
CHIEF COMPLAINT:     Follow-up from colon resection.     HISTORY OF PRESENT ILLNESS:     Eric Liu is a 67 y.o. male who underwent laparoscopic right hemicolectomy for an endoscopically unresectable polyp at the hepatic flexure of the ascending colon on 6/1/2017.  At his last office visit, he was still having issues with bowel movements.  They fluctuated between normal and diarrhea.  This is now resolved.  He is here for a screening colonoscopic exam.    Past Medical History:   Diagnosis Date   • Acute bronchitis, unspecified    • Anemia    • Atherosclerotic heart disease of native coronary artery without angina pectoris    • CAD (coronary atherosclerotic disease)    • Chest pain    • Colon polyp    • Colonic mass    • High risk medication use    • History of transfusion    • Hyperlipidemia    • Hyperplasia of prostate without urinary obstruction    • Hypertension    • Hypothyroidism (acquired)    • Mass of hepatic flexure of colon    • Myocardial infarction    • Retinal hemorrhage    • Type 2 diabetes mellitus        Past Surgical History:   Procedure Laterality Date   • CARDIAC CATHETERIZATION Left 01/27/2011    Left heart catheterization, coronary angiogarphy, left ventriculography-Dr. Sánchez Casanova   • CATARACT EXTRACTION Bilateral    • COLON RESECTION Right 6/1/2017    Procedure: LAPAROSCOPIC RIGHT COLON RESECTION;  Surgeon: Pollo Liao MD;  Location: [facility] OR;  Service:    • COLONOSCOPY N/A 01/31/2005    Colonic diverticulosis, internal hemorrhoids, and a few small colon polyps; Dr. Mushtaq Booth   • COLONOSCOPY N/A 1/24/2017    Procedure: COLONOSCOPY TO CECUM AND TERMINAL ILEUM WITH COLD BIOPSY AND HOT SNARE POLYPECTOMIES, INJECTED WITH NORMAL SALINE 3ML, INJECTED WITH SPOT INK 5ML, AND BIOPSIES;  Surgeon: Mushtaq Booth MD;  Location: [facility] ENDOSCOPY;  Service:    • COLONOSCOPY N/A 4/25/2017    Procedure: COLONOSCOPY to cecum and terminal ileum with cold polypectomy, and biopsies and tattoo (5cc)  to mass at 75cm ;  Surgeon: Mushtaq Booth MD;  Location: [facility] ENDOSCOPY;  Service:    • CORONARY ARTERY BYPASS GRAFT N/A 02/08/2011    Transesophageal echo, endoscopic vein harvest, coronary artery bypass graft x5, left internal mammary graft to the LAD, vein graft to RV branch, vein graft ot distal posterior descending artery, second marginal branch of the circumflex, third marginal branch of the circumflex, temporary cardiopulmonary bypass, antegrade and retrograde cold blood cardioplegia, warm reperfusion-Dr. Brandon Bucio   • NOSE SURGERY      Laser Suite Ret Treatment Pan-Ablated Inferior Nasal Retina   • SHOULDER MANIPULATION Left 09/01/2004    Manipulation under anesthesia and injection of left shoulder-Dr. INA Ceja   • TONSILLECTOMY N/A          Current Facility-Administered Medications:   •  lactated ringers infusion 1,000 mL, 1,000 mL, Intravenous, Continuous, Pollo Liao MD, Last Rate: 25 mL/hr at 06/06/18 1004, 1,000 mL at 06/06/18 1004  •  lidocaine (XYLOCAINE) 1 % injection 0.5 mL, 0.5 mL, Intradermal, Once PRN, Pollo Liao MD  •  sodium chloride 0.9 % flush 3 mL, 3 mL, Intravenous, PRN, Pollo Liao MD    No Known Allergies    Family History   Problem Relation Age of Onset   • Heart attack Father    • Heart disease Father    • Hypertension Other    • Malig Hyperthermia Neg Hx        Social History     Social History   • Marital status:      Spouse name: N/A   • Number of children: N/A   • Years of education: N/A     Occupational History   • Not on file.     Social History Main Topics   • Smoking status: Former Smoker     Years: 20.00     Quit date: 3/8/1986   • Smokeless tobacco: Never Used   • Alcohol use Yes      Comment: occasionally   • Drug use: No   • Sexual activity: Defer     Other Topics Concern   • Not on file     Social History Narrative   • No narrative on file       Review of Systems   Respiratory: Negative for shortness of breath.   "  Cardiovascular: Negative for chest pain and palpitations.   Gastrointestinal: Negative for blood in stool, constipation and diarrhea.       Objective     /77 (BP Location: Left arm, Patient Position: Sitting)   Pulse 63   Temp 98.1 °F (36.7 °C) (Oral)   Resp 16   Ht 180.3 cm (71\")   Wt 98.4 kg (217 lb)   SpO2 95%   BMI 30.27 kg/m²     Physical Exam   Constitutional: He is oriented to person, place, and time. He appears well-developed and well-nourished.   HENT:   Head: Normocephalic and atraumatic.   Eyes: Conjunctivae are normal. No scleral icterus.   Cardiovascular: Normal rate, regular rhythm, normal heart sounds and intact distal pulses.    No murmur heard.  Pulmonary/Chest: Effort normal and breath sounds normal. No respiratory distress. He has no wheezes. He has no rales.   Abdominal: Soft. Bowel sounds are normal. There is no tenderness. There is no guarding.   Musculoskeletal: He exhibits no edema or deformity.   Neurological: He is alert and oriented to person, place, and time.   Skin: Skin is warm and dry.   Psychiatric: He has a normal mood and affect.   Vitals reviewed.      DIAGNOSTIC DATA:    Tubulovillous adenoma on pathology    ASSESSMENT:     S/P laparoscopic right hemicolectomy on 6/1/2017 for an endoscopically unresectable polyp of the hepatic flexure (Tubulovillous adenoma 2.5 cm in size plus some smaller ones in the specimen.)        PLAN:     He is here for a screening exam.      "

## 2018-06-06 NOTE — OP NOTE
Preoperative diagnosis:   History of colon polyps.  History of colon resection.    Postoperative diagnosis:   Normal postoperative anatomy.  Diverticulosis.    Procedure:   Colonoscopy.    Attending surgeon: Pollo Liao M.D.  Anesthesia: MAC  Specimens:  None  Blood loss: Minimal.  Drains: None.  Bowel prep: Excellent.    Indications: Patient is a 67-year-old man who is asymptomatic, who underwent right colectomy in June 2017 by me for an unresectable polyp.  He presents for a surveillance exam.    Description of procedure: He is taken to the endoscopy suite and positioned on the stretcher in the left lateral decubitus position.  After graduated amounts of propofol were administered, a digital rectal exam was performed. There are no hemorrhoids.    The flexible colonoscope was inserted into the anus and advanced to the anastomosis without difficulty.  The anastomosis was identified at 85 cm from the anal verge.  There is a blind end of colon that was proximal to the anastomosis that looks normal.  The afferent limb was intubated easily.  It appeared normal.  The intraluminal surface of the colon was examined upon withdrawal scope.    The bowel prep was excellent.  There was turbid liquid within all segments of the colon that was easily evacuated with the suction channel the scope.  There were no polyps.  There were no vascular malformations.  A retroflexed view in the rectum revealed no internal hemorrhoids. There were wide and narrow orifice diverticuli in all colonic segments.     He tolerated the procedure very well, and is returned to the recovery area in stable condition.

## 2018-06-28 RX ORDER — AMLODIPINE BESYLATE 5 MG/1
TABLET ORAL
Qty: 45 TABLET | Refills: 4 | Status: SHIPPED | OUTPATIENT
Start: 2018-06-28 | End: 2018-11-26 | Stop reason: SDUPTHER

## 2018-07-24 ENCOUNTER — OFFICE VISIT (OUTPATIENT)
Dept: FAMILY MEDICINE CLINIC | Facility: CLINIC | Age: 68
End: 2018-07-24

## 2018-07-24 VITALS
WEIGHT: 223.4 LBS | TEMPERATURE: 97.8 F | DIASTOLIC BLOOD PRESSURE: 66 MMHG | HEART RATE: 61 BPM | OXYGEN SATURATION: 97 % | HEIGHT: 71 IN | BODY MASS INDEX: 31.27 KG/M2 | SYSTOLIC BLOOD PRESSURE: 146 MMHG

## 2018-07-24 DIAGNOSIS — L91.8 SKIN TAG: ICD-10-CM

## 2018-07-24 DIAGNOSIS — I10 ESSENTIAL HYPERTENSION: ICD-10-CM

## 2018-07-24 DIAGNOSIS — Z79.4 CONTROLLED TYPE 2 DIABETES MELLITUS WITHOUT COMPLICATION, WITH LONG-TERM CURRENT USE OF INSULIN (HCC): ICD-10-CM

## 2018-07-24 DIAGNOSIS — E03.9 ACQUIRED HYPOTHYROIDISM: ICD-10-CM

## 2018-07-24 DIAGNOSIS — D64.9 ANEMIA, UNSPECIFIED TYPE: ICD-10-CM

## 2018-07-24 DIAGNOSIS — I25.10 CORONARY ARTERIOSCLEROSIS IN NATIVE ARTERY: Primary | ICD-10-CM

## 2018-07-24 DIAGNOSIS — E11.9 CONTROLLED TYPE 2 DIABETES MELLITUS WITHOUT COMPLICATION, WITH LONG-TERM CURRENT USE OF INSULIN (HCC): ICD-10-CM

## 2018-07-24 DIAGNOSIS — E78.5 HYPERLIPIDEMIA, UNSPECIFIED HYPERLIPIDEMIA TYPE: ICD-10-CM

## 2018-07-24 LAB
BASOPHILS # BLD AUTO: 0.06 10*3/MM3 (ref 0–0.2)
BASOPHILS NFR BLD AUTO: 0.6 % (ref 0–1.5)
BUN SERPL-MCNC: 17 MG/DL (ref 8–23)
BUN/CREAT SERPL: 16 (ref 7–25)
CALCIUM SERPL-MCNC: 9.4 MG/DL (ref 8.6–10.5)
CHLORIDE SERPL-SCNC: 101 MMOL/L (ref 98–107)
CO2 SERPL-SCNC: 28.5 MMOL/L (ref 22–29)
CREAT SERPL-MCNC: 1.06 MG/DL (ref 0.76–1.27)
EOSINOPHIL # BLD AUTO: 0.26 10*3/MM3 (ref 0–0.7)
EOSINOPHIL NFR BLD AUTO: 2.4 % (ref 0.3–6.2)
ERYTHROCYTE [DISTWIDTH] IN BLOOD BY AUTOMATED COUNT: 14.2 % (ref 11.5–14.5)
GLUCOSE SERPL-MCNC: 70 MG/DL (ref 65–99)
HBA1C MFR BLD: 7.81 % (ref 4.8–5.6)
HCT VFR BLD AUTO: 40.6 % (ref 40.4–52.2)
HGB BLD-MCNC: 13 G/DL (ref 13.7–17.6)
IMM GRANULOCYTES # BLD: 0.02 10*3/MM3 (ref 0–0.03)
IMM GRANULOCYTES NFR BLD: 0.2 % (ref 0–0.5)
LYMPHOCYTES # BLD AUTO: 2.79 10*3/MM3 (ref 0.9–4.8)
LYMPHOCYTES NFR BLD AUTO: 26.1 % (ref 19.6–45.3)
MCH RBC QN AUTO: 27.3 PG (ref 27–32.7)
MCHC RBC AUTO-ENTMCNC: 32 G/DL (ref 32.6–36.4)
MCV RBC AUTO: 85.3 FL (ref 79.8–96.2)
MONOCYTES # BLD AUTO: 0.85 10*3/MM3 (ref 0.2–1.2)
MONOCYTES NFR BLD AUTO: 8 % (ref 5–12)
NEUTROPHILS # BLD AUTO: 6.71 10*3/MM3 (ref 1.9–8.1)
NEUTROPHILS NFR BLD AUTO: 62.9 % (ref 42.7–76)
PLATELET # BLD AUTO: 303 10*3/MM3 (ref 140–500)
POTASSIUM SERPL-SCNC: 3.7 MMOL/L (ref 3.5–5.2)
RBC # BLD AUTO: 4.76 10*6/MM3 (ref 4.6–6)
SODIUM SERPL-SCNC: 143 MMOL/L (ref 136–145)
T4 FREE SERPL-MCNC: 1.54 NG/DL (ref 0.93–1.7)
TSH SERPL DL<=0.005 MIU/L-ACNC: 4.14 MIU/ML (ref 0.27–4.2)
WBC # BLD AUTO: 10.67 10*3/MM3 (ref 4.5–10.7)

## 2018-07-24 PROCEDURE — 99213 OFFICE O/P EST LOW 20 MIN: CPT | Performed by: FAMILY MEDICINE

## 2018-07-24 PROCEDURE — 11200 RMVL SKIN TAGS UP TO&INC 15: CPT | Performed by: FAMILY MEDICINE

## 2018-07-24 RX ORDER — SODIUM, POTASSIUM,MAG SULFATES 17.5-3.13G
SOLUTION, RECONSTITUTED, ORAL ORAL
COMMUNITY
Start: 2018-06-03 | End: 2018-07-24

## 2018-07-24 NOTE — PROGRESS NOTES
HPI  Eric Liu is a 68 y.o. male who is here for follow up of diabetes and coronary artery disease hypertension and hyperlipidemia.  Recently had colonoscopy for follow-up after colon resection said to be clear.  Also seen by cardiologist and told to return in one year.  Doing well with no complaints at this time.  Did have thyroid supplement increased 3 months ago and will recheck level.  Only new complaint is a polyp in front of the left axilla which is catching on things and irritated etc.  All of this was discussed including removal of polyp.      Review of Systems   Respiratory: Negative for shortness of breath.    Cardiovascular: Negative for chest pain.   All other systems reviewed and are negative.        Past Medical History:   Diagnosis Date   • Acute bronchitis, unspecified    • Anemia    • Atherosclerotic heart disease of native coronary artery without angina pectoris    • CAD (coronary atherosclerotic disease)    • Chest pain    • Colon polyp    • Colonic mass    • High risk medication use    • History of transfusion    • Hyperlipidemia    • Hyperplasia of prostate without urinary obstruction    • Hypertension    • Hypothyroidism (acquired)    • Mass of hepatic flexure of colon    • Myocardial infarction    • Retinal hemorrhage    • Type 2 diabetes mellitus (CMS/HCC)        Past Surgical History:   Procedure Laterality Date   • CARDIAC CATHETERIZATION Left 01/27/2011    Left heart catheterization, coronary angiogarphy, left ventriculography-Dr. Sánchez Casanova   • CATARACT EXTRACTION Bilateral    • COLON RESECTION Right 6/1/2017    Procedure: LAPAROSCOPIC RIGHT COLON RESECTION;  Surgeon: Pollo Liao MD;  Location: [facility];  Service:    • COLONOSCOPY N/A 01/31/2005    Colonic diverticulosis, internal hemorrhoids, and a few small colon polyps; Dr. Mushtaq Booth   • COLONOSCOPY N/A 1/24/2017    Procedure: COLONOSCOPY TO CECUM AND TERMINAL ILEUM WITH COLD BIOPSY AND HOT SNARE POLYPECTOMIES,  INJECTED WITH NORMAL SALINE 3ML, INJECTED WITH SPOT INK 5ML, AND BIOPSIES;  Surgeon: Mushtaq Booth MD;  Location: [facility] ENDOSCOPY;  Service:    • COLONOSCOPY N/A 4/25/2017    Procedure: COLONOSCOPY to cecum and terminal ileum with cold polypectomy, and biopsies and tattoo (5cc) to mass at 75cm ;  Surgeon: Mushtaq Booth MD;  Location: [facility] ENDOSCOPY;  Service:    • COLONOSCOPY N/A 6/6/2018    Procedure: COLONOSCOPY TO ANASTOMOSIS AND INTO TERMINAL ILEUM;  Surgeon: Pollo Liao MD;  Location: [facility] ENDOSCOPY;  Service: Gastroenterology   • CORONARY ARTERY BYPASS GRAFT N/A 02/08/2011    Transesophageal echo, endoscopic vein harvest, coronary artery bypass graft x5, left internal mammary graft to the LAD, vein graft to RV branch, vein graft ot distal posterior descending artery, second marginal branch of the circumflex, third marginal branch of the circumflex, temporary cardiopulmonary bypass, antegrade and retrograde cold blood cardioplegia, warm reperfusion-Dr. Brandon Bucio   • NOSE SURGERY      Laser Suite Ret Treatment Pan-Ablated Inferior Nasal Retina   • SHOULDER MANIPULATION Left 09/01/2004    Manipulation under anesthesia and injection of left shoulder-Dr. INA Ceja   • TONSILLECTOMY N/A        Family History   Problem Relation Age of Onset   • Heart attack Father    • Heart disease Father    • Hypertension Other    • Malig Hyperthermia Neg Hx        Social History     Social History   • Marital status:      Spouse name: N/A   • Number of children: N/A   • Years of education: N/A     Occupational History   • Not on file.     Social History Main Topics   • Smoking status: Former Smoker     Years: 20.00     Quit date: 3/8/1986   • Smokeless tobacco: Never Used   • Alcohol use Yes      Comment: occasionally   • Drug use: No   • Sexual activity: Defer     Other Topics Concern   • Not on file     Social History Narrative   • No narrative on file         Physical Exam   Constitutional: He is  oriented to person, place, and time. He appears well-developed and well-nourished. No distress.   HENT:   Head: Normocephalic and atraumatic.   Mouth/Throat: Oropharynx is clear and moist.   Eyes: Pupils are equal, round, and reactive to light. EOM are normal.   Neck: Normal range of motion. Neck supple.   Cardiovascular: Normal rate and regular rhythm.    Pulmonary/Chest: Effort normal. No respiratory distress.   Abdominal: Soft. He exhibits no distension.   Musculoskeletal: He exhibits no edema or deformity.   Neurological: He is oriented to person, place, and time. Coordination normal.   Skin: Skin is warm and dry.        Psychiatric: He has a normal mood and affect. His behavior is normal. Judgment and thought content normal.   Nursing note and vitals reviewed.        Assessment/Plan    Eric was seen today for hyperlipidemia, hypertension, diabetes and coronary artery disease.    Diagnoses and all orders for this visit:    Coronary arteriosclerosis in native artery    Hyperlipidemia, unspecified hyperlipidemia type    Essential hypertension  -     Basic Metabolic Panel    Acquired hypothyroidism  -     TSH+Free T4    Anemia, unspecified type  -     CBC & Differential    Controlled type 2 diabetes mellitus without complication, with long-term current use of insulin (CMS/[facility])  -     Basic Metabolic Panel  -     Hemoglobin A1c    Skin tag      Patient here for routine follow-up of multiple medical problems.  Doing very well on current regimen which will be continued.  Recheck lab as noted and adjust medication as indicated.  Otherwise follow-up in 3 months.    A large pedunculated skin tag noted anterior to the left axilla.  After cleansing with alcohol and Xylocaine prep tag was removed after clamping with scissors and bleeding controlled with silver nitrate and pressure.  Call if any problems.    This note includes information entered using a voice recognition dictation system.  Though reviewed, some  nonsensible errors may remain.

## 2018-08-03 RX ORDER — INSULIN NPH HUM/REG INSULIN HM 70-30/ML
VIAL (ML) SUBCUTANEOUS
Qty: 10 ML | Refills: 2 | Status: SHIPPED | OUTPATIENT
Start: 2018-08-03 | End: 2018-09-05 | Stop reason: SDUPTHER

## 2018-09-05 DIAGNOSIS — Z79.4 CONTROLLED TYPE 2 DIABETES MELLITUS WITHOUT COMPLICATION, WITH LONG-TERM CURRENT USE OF INSULIN (HCC): Primary | ICD-10-CM

## 2018-09-05 DIAGNOSIS — E11.9 CONTROLLED TYPE 2 DIABETES MELLITUS WITHOUT COMPLICATION, WITH LONG-TERM CURRENT USE OF INSULIN (HCC): Primary | ICD-10-CM

## 2018-09-10 DIAGNOSIS — E11.9 CONTROLLED TYPE 2 DIABETES MELLITUS WITHOUT COMPLICATION, WITH LONG-TERM CURRENT USE OF INSULIN (HCC): ICD-10-CM

## 2018-09-10 DIAGNOSIS — Z79.4 CONTROLLED TYPE 2 DIABETES MELLITUS WITHOUT COMPLICATION, WITH LONG-TERM CURRENT USE OF INSULIN (HCC): ICD-10-CM

## 2018-09-17 NOTE — ANESTHESIA PREPROCEDURE EVALUATION
Anesthesia Evaluation     Patient summary reviewed and Nursing notes reviewed   NPO Solid Status: > 8 hours  NPO Liquid Status: > 6 hours           Airway   Mallampati: III  Possible difficult intubation  Dental    (+) lower dentures and upper dentures    Pulmonary - normal exam   (+) a smoker Former,   Cardiovascular     Patient on routine beta blocker    (+) hypertension 2 medications or greater, past MI , CAD, CABG, hyperlipidemia,       Neuro/Psych  GI/Hepatic/Renal/Endo    (+)   diabetes mellitus type 2 using insulin, hypothyroidism,     Musculoskeletal     Abdominal  - normal exam   Substance History      OB/GYN          Other                      Anesthesia Plan    ASA 3     MAC     Anesthetic plan and risks discussed with patient.      
.

## 2018-10-17 RX ORDER — LISINOPRIL 40 MG/1
TABLET ORAL
Qty: 60 TABLET | Refills: 6 | Status: SHIPPED | OUTPATIENT
Start: 2018-10-17 | End: 2018-12-13 | Stop reason: SDUPTHER

## 2018-10-24 ENCOUNTER — OFFICE VISIT (OUTPATIENT)
Dept: FAMILY MEDICINE CLINIC | Facility: CLINIC | Age: 68
End: 2018-10-24

## 2018-10-24 VITALS
SYSTOLIC BLOOD PRESSURE: 120 MMHG | HEART RATE: 61 BPM | HEIGHT: 71 IN | BODY MASS INDEX: 31.33 KG/M2 | WEIGHT: 223.8 LBS | OXYGEN SATURATION: 98 % | DIASTOLIC BLOOD PRESSURE: 80 MMHG | TEMPERATURE: 97.8 F

## 2018-10-24 DIAGNOSIS — E03.9 ACQUIRED HYPOTHYROIDISM: ICD-10-CM

## 2018-10-24 DIAGNOSIS — D64.9 ANEMIA, UNSPECIFIED TYPE: ICD-10-CM

## 2018-10-24 DIAGNOSIS — E11.65 UNCONTROLLED TYPE 2 DIABETES MELLITUS WITH HYPERGLYCEMIA (HCC): ICD-10-CM

## 2018-10-24 DIAGNOSIS — I10 ESSENTIAL HYPERTENSION: ICD-10-CM

## 2018-10-24 DIAGNOSIS — Z79.899 HIGH RISK MEDICATION USE: Primary | ICD-10-CM

## 2018-10-24 DIAGNOSIS — E78.5 HYPERLIPIDEMIA, UNSPECIFIED HYPERLIPIDEMIA TYPE: ICD-10-CM

## 2018-10-24 PROCEDURE — 99213 OFFICE O/P EST LOW 20 MIN: CPT | Performed by: FAMILY MEDICINE

## 2018-10-24 NOTE — PROGRESS NOTES
HPI  Eric Liu is a 68 y.o. male who is here for follow up of diabetes known coronary artery disease hypothyroidism and other medical problems as noted below.  Denies any new complaints and seems to be doing extremely well on current regimen including insulin.  Discussed getting flu shot at local pharmacy since we are currently out.      Review of Systems   All other systems reviewed and are negative.        Past Medical History:   Diagnosis Date   • Acute bronchitis, unspecified    • Anemia    • Atherosclerotic heart disease of native coronary artery without angina pectoris    • CAD (coronary atherosclerotic disease)    • Chest pain    • Colon polyp    • Colonic mass    • High risk medication use    • History of transfusion    • Hyperlipidemia    • Hyperplasia of prostate without urinary obstruction    • Hypertension    • Hypothyroidism (acquired)    • Mass of hepatic flexure of colon    • Myocardial infarction    • Retinal hemorrhage    • Type 2 diabetes mellitus (CMS/HCC)        Past Surgical History:   Procedure Laterality Date   • CARDIAC CATHETERIZATION Left 01/27/2011    Left heart catheterization, coronary angiogarphy, left ventriculography-Dr. Sánchez Casanova   • CATARACT EXTRACTION Bilateral    • COLON RESECTION Right 6/1/2017    Procedure: LAPAROSCOPIC RIGHT COLON RESECTION;  Surgeon: Pollo Liao MD;  Location: [facility] OR;  Service:    • COLONOSCOPY N/A 01/31/2005    Colonic diverticulosis, internal hemorrhoids, and a few small colon polyps; Dr. Mushtaq Booth   • COLONOSCOPY N/A 1/24/2017    Procedure: COLONOSCOPY TO CECUM AND TERMINAL ILEUM WITH COLD BIOPSY AND HOT SNARE POLYPECTOMIES, INJECTED WITH NORMAL SALINE 3ML, INJECTED WITH SPOT INK 5ML, AND BIOPSIES;  Surgeon: Mushtaq Booth MD;  Location: [facility] ENDOSCOPY;  Service:    • COLONOSCOPY N/A 4/25/2017    Procedure: COLONOSCOPY to cecum and terminal ileum with cold polypectomy, and biopsies and tattoo (5cc) to mass at 75cm ;  Surgeon: Mushtaq  MD Leobardo;  Location: [facility] ENDOSCOPY;  Service:    • COLONOSCOPY N/A 6/6/2018    Procedure: COLONOSCOPY TO ANASTOMOSIS AND INTO TERMINAL ILEUM;  Surgeon: Pollo Liao MD;  Location: [facility] ENDOSCOPY;  Service: Gastroenterology   • CORONARY ARTERY BYPASS GRAFT N/A 02/08/2011    Transesophageal echo, endoscopic vein harvest, coronary artery bypass graft x5, left internal mammary graft to the LAD, vein graft to RV branch, vein graft ot distal posterior descending artery, second marginal branch of the circumflex, third marginal branch of the circumflex, temporary cardiopulmonary bypass, antegrade and retrograde cold blood cardioplegia, warm reperfusion-Dr. Brandon Bucio   • NOSE SURGERY      Laser Suite Ret Treatment Pan-Ablated Inferior Nasal Retina   • SHOULDER MANIPULATION Left 09/01/2004    Manipulation under anesthesia and injection of left shoulder-Dr. INA Ceja   • TONSILLECTOMY N/A        Family History   Problem Relation Age of Onset   • Heart attack Father    • Heart disease Father    • Hypertension Other    • Malig Hyperthermia Neg Hx        Social History     Social History   • Marital status:      Spouse name: N/A   • Number of children: N/A   • Years of education: N/A     Occupational History   • Not on file.     Social History Main Topics   • Smoking status: Former Smoker     Years: 20.00     Quit date: 3/8/1986   • Smokeless tobacco: Never Used   • Alcohol use Yes      Comment: occasionally   • Drug use: No   • Sexual activity: Defer     Other Topics Concern   • Not on file     Social History Narrative   • No narrative on file         Physical Exam   Constitutional: He is oriented to person, place, and time. He appears well-developed and well-nourished. No distress.   HENT:   Head: Normocephalic.   Nose: Nose normal.   Mouth/Throat: Oropharynx is clear and moist.   Eyes: Pupils are equal, round, and reactive to light. Conjunctivae are normal.   Neck: Normal range of motion. Neck  supple.   Cardiovascular: Normal rate, regular rhythm and normal heart sounds.    Pulmonary/Chest: Effort normal and breath sounds normal.   Abdominal: Soft. He exhibits no distension. There is no tenderness.   Musculoskeletal: Normal range of motion. He exhibits no edema or deformity.   Neurological: He is alert and oriented to person, place, and time. Coordination normal.   Skin: Skin is warm and dry.   Psychiatric: He has a normal mood and affect. His behavior is normal. Judgment and thought content normal.   Nursing note and vitals reviewed.        Assessment/Plan    Eric was seen today for annual exam.    Diagnoses and all orders for this visit:    High risk medication use  -     Comprehensive Metabolic Panel    Hyperlipidemia, unspecified hyperlipidemia type  -     Lipid Panel    Essential hypertension    Acquired hypothyroidism    Uncontrolled type 2 diabetes mellitus with hyperglycemia (CMS/[facility])  -     Hemoglobin A1c  -     Microalbumin / Creatinine Urine Ratio - Urine, Clean Catch    Anemia, unspecified type  -     CBC & Differential        Patient here for routine follow-up of multiple medical problems some of which are noted above.  Seems to be doing extremely well on current regimen which will be continued including follow-up in 3 months.    This note includes information entered using a voice recognition dictation system.  Though reviewed, some nonsensible errors may remain.

## 2018-10-25 ENCOUNTER — FLU SHOT (OUTPATIENT)
Dept: FAMILY MEDICINE CLINIC | Facility: CLINIC | Age: 68
End: 2018-10-25

## 2018-10-25 PROCEDURE — G0008 ADMIN INFLUENZA VIRUS VAC: HCPCS | Performed by: FAMILY MEDICINE

## 2018-10-25 PROCEDURE — 90662 IIV NO PRSV INCREASED AG IM: CPT | Performed by: FAMILY MEDICINE

## 2018-10-26 DIAGNOSIS — Z79.4 CONTROLLED TYPE 2 DIABETES MELLITUS WITHOUT COMPLICATION, WITH LONG-TERM CURRENT USE OF INSULIN (HCC): ICD-10-CM

## 2018-10-26 DIAGNOSIS — I25.10 CORONARY ARTERIOSCLEROSIS IN NATIVE ARTERY: ICD-10-CM

## 2018-10-26 DIAGNOSIS — R09.89 DECREASED DORSALIS PEDIS PULSE: Primary | ICD-10-CM

## 2018-10-26 DIAGNOSIS — E11.9 CONTROLLED TYPE 2 DIABETES MELLITUS WITHOUT COMPLICATION, WITH LONG-TERM CURRENT USE OF INSULIN (HCC): ICD-10-CM

## 2018-10-26 LAB
ALBUMIN SERPL-MCNC: 4.5 G/DL (ref 3.5–5.2)
ALBUMIN/CREAT UR: 955.1 MG/G CREAT (ref 0–30)
ALBUMIN/GLOB SERPL: 1.7 G/DL
ALP SERPL-CCNC: 84 U/L (ref 39–117)
ALT SERPL-CCNC: 24 U/L (ref 1–41)
AST SERPL-CCNC: 22 U/L (ref 1–40)
BASOPHILS # BLD AUTO: 0.04 10*3/MM3 (ref 0–0.2)
BASOPHILS NFR BLD AUTO: 0.5 % (ref 0–1.5)
BILIRUB SERPL-MCNC: 0.3 MG/DL (ref 0.1–1.2)
BUN SERPL-MCNC: 13 MG/DL (ref 8–23)
BUN/CREAT SERPL: 11.7 (ref 7–25)
CALCIUM SERPL-MCNC: 9.1 MG/DL (ref 8.6–10.5)
CHLORIDE SERPL-SCNC: 101 MMOL/L (ref 98–107)
CHOLEST SERPL-MCNC: 142 MG/DL (ref 0–200)
CO2 SERPL-SCNC: 29.6 MMOL/L (ref 22–29)
CREAT SERPL-MCNC: 1.11 MG/DL (ref 0.76–1.27)
CREAT UR-MCNC: 93.4 MG/DL
EOSINOPHIL # BLD AUTO: 0.26 10*3/MM3 (ref 0–0.7)
EOSINOPHIL NFR BLD AUTO: 3 % (ref 0.3–6.2)
ERYTHROCYTE [DISTWIDTH] IN BLOOD BY AUTOMATED COUNT: 14.2 % (ref 11.5–14.5)
GLOBULIN SER CALC-MCNC: 2.6 GM/DL
GLUCOSE SERPL-MCNC: 110 MG/DL (ref 65–99)
HBA1C MFR BLD: 8.2 % (ref 4.8–5.6)
HCT VFR BLD AUTO: 41.2 % (ref 40.4–52.2)
HDLC SERPL-MCNC: 41 MG/DL (ref 40–60)
HGB BLD-MCNC: 13.3 G/DL (ref 13.7–17.6)
IMM GRANULOCYTES # BLD: 0.01 10*3/MM3 (ref 0–0.03)
IMM GRANULOCYTES NFR BLD: 0.1 % (ref 0–0.5)
LDLC SERPL CALC-MCNC: 73 MG/DL (ref 0–100)
LYMPHOCYTES # BLD AUTO: 2.39 10*3/MM3 (ref 0.9–4.8)
LYMPHOCYTES NFR BLD AUTO: 27.2 % (ref 19.6–45.3)
MCH RBC QN AUTO: 27.5 PG (ref 27–32.7)
MCHC RBC AUTO-ENTMCNC: 32.3 G/DL (ref 32.6–36.4)
MCV RBC AUTO: 85.1 FL (ref 79.8–96.2)
MICROALBUMIN UR-MCNC: 892.1 UG/ML
MONOCYTES # BLD AUTO: 0.69 10*3/MM3 (ref 0.2–1.2)
MONOCYTES NFR BLD AUTO: 7.8 % (ref 5–12)
NEUTROPHILS # BLD AUTO: 5.42 10*3/MM3 (ref 1.9–8.1)
NEUTROPHILS NFR BLD AUTO: 61.5 % (ref 42.7–76)
PLATELET # BLD AUTO: 256 10*3/MM3 (ref 140–500)
POTASSIUM SERPL-SCNC: 3.9 MMOL/L (ref 3.5–5.2)
PROT SERPL-MCNC: 7.1 G/DL (ref 6–8.5)
RBC # BLD AUTO: 4.84 10*6/MM3 (ref 4.6–6)
SODIUM SERPL-SCNC: 143 MMOL/L (ref 136–145)
TRIGL SERPL-MCNC: 140 MG/DL (ref 0–150)
VLDLC SERPL CALC-MCNC: 28 MG/DL (ref 5–40)
WBC # BLD AUTO: 8.8 10*3/MM3 (ref 4.5–10.7)

## 2018-11-01 DIAGNOSIS — I25.10 CORONARY ARTERIOSCLEROSIS IN NATIVE ARTERY: ICD-10-CM

## 2018-11-01 DIAGNOSIS — Z79.4 CONTROLLED TYPE 2 DIABETES MELLITUS WITHOUT COMPLICATION, WITH LONG-TERM CURRENT USE OF INSULIN (HCC): ICD-10-CM

## 2018-11-01 DIAGNOSIS — E11.9 CONTROLLED TYPE 2 DIABETES MELLITUS WITHOUT COMPLICATION, WITH LONG-TERM CURRENT USE OF INSULIN (HCC): ICD-10-CM

## 2018-11-26 RX ORDER — CARVEDILOL 25 MG/1
TABLET ORAL
Qty: 180 TABLET | Refills: 2 | Status: SHIPPED | OUTPATIENT
Start: 2018-11-26 | End: 2019-01-08 | Stop reason: SDUPTHER

## 2018-11-27 RX ORDER — AMLODIPINE BESYLATE 5 MG/1
TABLET ORAL
Qty: 45 TABLET | Refills: 5 | Status: SHIPPED | OUTPATIENT
Start: 2018-11-27 | End: 2018-12-13 | Stop reason: SDUPTHER

## 2018-11-29 DIAGNOSIS — I25.10 CORONARY ARTERIOSCLEROSIS IN NATIVE ARTERY: ICD-10-CM

## 2018-11-29 DIAGNOSIS — Z79.4 CONTROLLED TYPE 2 DIABETES MELLITUS WITHOUT COMPLICATION, WITH LONG-TERM CURRENT USE OF INSULIN (HCC): ICD-10-CM

## 2018-11-29 DIAGNOSIS — E11.9 CONTROLLED TYPE 2 DIABETES MELLITUS WITHOUT COMPLICATION, WITH LONG-TERM CURRENT USE OF INSULIN (HCC): ICD-10-CM

## 2018-12-13 RX ORDER — AMLODIPINE BESYLATE 5 MG/1
2.5 TABLET ORAL EVERY MORNING
Qty: 135 TABLET | Refills: 1 | Status: SHIPPED | OUTPATIENT
Start: 2018-12-13 | End: 2019-06-20 | Stop reason: SDUPTHER

## 2018-12-13 RX ORDER — ATORVASTATIN CALCIUM 80 MG/1
TABLET, FILM COATED ORAL
Qty: 135 TABLET | Refills: 3 | Status: SHIPPED | OUTPATIENT
Start: 2018-12-13 | End: 2020-03-31

## 2018-12-13 RX ORDER — LISINOPRIL 40 MG/1
40 TABLET ORAL 2 TIMES DAILY
Qty: 180 TABLET | Refills: 1 | Status: SHIPPED | OUTPATIENT
Start: 2018-12-13 | End: 2019-06-11 | Stop reason: SDUPTHER

## 2018-12-19 DIAGNOSIS — E03.9 ACQUIRED HYPOTHYROIDISM: ICD-10-CM

## 2018-12-19 RX ORDER — LEVOTHYROXINE SODIUM 112 UG/1
112 TABLET ORAL DAILY
Qty: 60 TABLET | Refills: 0 | Status: SHIPPED | OUTPATIENT
Start: 2018-12-19 | End: 2019-01-08 | Stop reason: SDUPTHER

## 2018-12-26 ENCOUNTER — TELEPHONE (OUTPATIENT)
Dept: FAMILY MEDICINE CLINIC | Facility: CLINIC | Age: 68
End: 2018-12-26

## 2019-01-02 ENCOUNTER — TELEPHONE (OUTPATIENT)
Dept: FAMILY MEDICINE CLINIC | Facility: CLINIC | Age: 69
End: 2019-01-02

## 2019-01-02 NOTE — TELEPHONE ENCOUNTER
----- Message from Kiara Campbell sent at 1/2/2019 11:06 AM EST -----  insulin NPH-insulin regular (NOVOLIN 70/30) (70-30) 100 UNIT/ML injection  Inject 40 Units under the skin into the appropriate area as directed 2 (Two) Times a Day With Meals.    PLEASE SEND TEMP SUPPLY - MAIL ORDER WILL NOT GET TO HIM IN TIME PER SUDEEP NARVAEZ

## 2019-01-08 DIAGNOSIS — E03.9 ACQUIRED HYPOTHYROIDISM: ICD-10-CM

## 2019-01-08 RX ORDER — LEVOTHYROXINE SODIUM 112 UG/1
112 TABLET ORAL DAILY
Qty: 90 TABLET | Refills: 3 | Status: SHIPPED | OUTPATIENT
Start: 2019-01-08 | End: 2019-01-10 | Stop reason: SDUPTHER

## 2019-01-08 RX ORDER — CARVEDILOL 25 MG/1
25 TABLET ORAL 2 TIMES DAILY
Qty: 180 TABLET | Refills: 3 | Status: SHIPPED | OUTPATIENT
Start: 2019-01-08 | End: 2019-01-10 | Stop reason: SDUPTHER

## 2019-01-10 DIAGNOSIS — E03.9 ACQUIRED HYPOTHYROIDISM: ICD-10-CM

## 2019-01-10 RX ORDER — CARVEDILOL 25 MG/1
25 TABLET ORAL 2 TIMES DAILY
Qty: 180 TABLET | Refills: 3 | Status: SHIPPED | OUTPATIENT
Start: 2019-01-10 | End: 2020-02-18

## 2019-01-10 RX ORDER — LEVOTHYROXINE SODIUM 112 UG/1
112 TABLET ORAL DAILY
Qty: 90 TABLET | Refills: 3 | Status: SHIPPED | OUTPATIENT
Start: 2019-01-10 | End: 2020-01-14

## 2019-01-23 ENCOUNTER — OFFICE VISIT (OUTPATIENT)
Dept: FAMILY MEDICINE CLINIC | Facility: CLINIC | Age: 69
End: 2019-01-23

## 2019-01-23 VITALS
HEIGHT: 71 IN | HEART RATE: 69 BPM | TEMPERATURE: 97.7 F | WEIGHT: 220.4 LBS | DIASTOLIC BLOOD PRESSURE: 58 MMHG | BODY MASS INDEX: 30.85 KG/M2 | OXYGEN SATURATION: 98 % | SYSTOLIC BLOOD PRESSURE: 108 MMHG | RESPIRATION RATE: 16 BRPM

## 2019-01-23 DIAGNOSIS — Z23 IMMUNIZATION DUE: ICD-10-CM

## 2019-01-23 DIAGNOSIS — Z79.899 HIGH RISK MEDICATION USE: ICD-10-CM

## 2019-01-23 DIAGNOSIS — D64.9 ANEMIA, UNSPECIFIED TYPE: ICD-10-CM

## 2019-01-23 DIAGNOSIS — E11.65 UNCONTROLLED TYPE 2 DIABETES MELLITUS WITH HYPERGLYCEMIA (HCC): ICD-10-CM

## 2019-01-23 DIAGNOSIS — E11.9 CONTROLLED TYPE 2 DIABETES MELLITUS WITHOUT COMPLICATION, WITH LONG-TERM CURRENT USE OF INSULIN (HCC): Primary | ICD-10-CM

## 2019-01-23 DIAGNOSIS — E03.9 ACQUIRED HYPOTHYROIDISM: ICD-10-CM

## 2019-01-23 DIAGNOSIS — G56.02 CARPAL TUNNEL SYNDROME OF LEFT WRIST: ICD-10-CM

## 2019-01-23 DIAGNOSIS — Z79.4 CONTROLLED TYPE 2 DIABETES MELLITUS WITHOUT COMPLICATION, WITH LONG-TERM CURRENT USE OF INSULIN (HCC): Primary | ICD-10-CM

## 2019-01-23 LAB
BASOPHILS # BLD AUTO: 0.03 10*3/MM3 (ref 0–0.2)
BASOPHILS NFR BLD AUTO: 0.3 % (ref 0–1.5)
BUN SERPL-MCNC: 18 MG/DL (ref 8–23)
BUN/CREAT SERPL: 14.6 (ref 7–25)
CALCIUM SERPL-MCNC: 8.9 MG/DL (ref 8.6–10.5)
CHLORIDE SERPL-SCNC: 101 MMOL/L (ref 98–107)
CO2 SERPL-SCNC: 26.8 MMOL/L (ref 22–29)
CREAT SERPL-MCNC: 1.23 MG/DL (ref 0.76–1.27)
EOSINOPHIL # BLD AUTO: 0.2 10*3/MM3 (ref 0–0.7)
EOSINOPHIL NFR BLD AUTO: 1.9 % (ref 0.3–6.2)
ERYTHROCYTE [DISTWIDTH] IN BLOOD BY AUTOMATED COUNT: 14.6 % (ref 11.5–14.5)
GLUCOSE SERPL-MCNC: 77 MG/DL (ref 65–99)
HBA1C MFR BLD: 7.16 % (ref 4.8–5.6)
HCT VFR BLD AUTO: 45.5 % (ref 40.4–52.2)
HGB BLD-MCNC: 14.6 G/DL (ref 13.7–17.6)
IMM GRANULOCYTES # BLD AUTO: 0.01 10*3/MM3 (ref 0–0.03)
IMM GRANULOCYTES NFR BLD AUTO: 0.1 % (ref 0–0.5)
LYMPHOCYTES # BLD AUTO: 2.47 10*3/MM3 (ref 0.9–4.8)
LYMPHOCYTES NFR BLD AUTO: 23.5 % (ref 19.6–45.3)
MCH RBC QN AUTO: 28.1 PG (ref 27–32.7)
MCHC RBC AUTO-ENTMCNC: 32.1 G/DL (ref 32.6–36.4)
MCV RBC AUTO: 87.7 FL (ref 79.8–96.2)
MONOCYTES # BLD AUTO: 1.01 10*3/MM3 (ref 0.2–1.2)
MONOCYTES NFR BLD AUTO: 9.6 % (ref 5–12)
NEUTROPHILS # BLD AUTO: 6.78 10*3/MM3 (ref 1.9–8.1)
NEUTROPHILS NFR BLD AUTO: 64.7 % (ref 42.7–76)
PLATELET # BLD AUTO: 289 10*3/MM3 (ref 140–500)
POTASSIUM SERPL-SCNC: 3.6 MMOL/L (ref 3.5–5.2)
RBC # BLD AUTO: 5.19 10*6/MM3 (ref 4.6–6)
SODIUM SERPL-SCNC: 145 MMOL/L (ref 136–145)
T4 FREE SERPL-MCNC: 1.56 NG/DL (ref 0.93–1.7)
TSH SERPL DL<=0.005 MIU/L-ACNC: 3.56 MIU/ML (ref 0.27–4.2)
WBC # BLD AUTO: 10.49 10*3/MM3 (ref 4.5–10.7)

## 2019-01-23 PROCEDURE — 90715 TDAP VACCINE 7 YRS/> IM: CPT | Performed by: FAMILY MEDICINE

## 2019-01-23 PROCEDURE — 90471 IMMUNIZATION ADMIN: CPT | Performed by: FAMILY MEDICINE

## 2019-01-23 PROCEDURE — 99214 OFFICE O/P EST MOD 30 MIN: CPT | Performed by: FAMILY MEDICINE

## 2019-01-23 NOTE — PROGRESS NOTES
HPI  Eric Liu is a 68 y.o. male who is here for follow up of diabetes and hypothyroidism.  Also has known coronary artery disease.  Is concerned about heart because of persistent numbness and tingling in his left hand which pretty much is constant.  Not related to activity or time of day etc.  Especially affects his fingers.  Recently started on new medication for diabetes and for now have been giving samples.       Review of Systems   Neurological: Positive for numbness.   All other systems reviewed and are negative.        Past Medical History:   Diagnosis Date   • Acute bronchitis, unspecified    • Anemia    • Atherosclerotic heart disease of native coronary artery without angina pectoris    • CAD (coronary atherosclerotic disease)    • Chest pain    • Colon polyp    • Colonic mass    • High risk medication use    • History of transfusion    • Hyperlipidemia    • Hyperplasia of prostate without urinary obstruction    • Hypertension    • Hypothyroidism (acquired)    • Mass of hepatic flexure of colon    • Myocardial infarction (CMS/HCC)    • Retinal hemorrhage    • Type 2 diabetes mellitus (CMS/HCC)        Past Surgical History:   Procedure Laterality Date   • CARDIAC CATHETERIZATION Left 01/27/2011    Left heart catheterization, coronary angiogarphy, left ventriculography-Dr. Sánchez Casanova   • CATARACT EXTRACTION Bilateral    • COLON RESECTION Right 6/1/2017    Procedure: LAPAROSCOPIC RIGHT COLON RESECTION;  Surgeon: Pollo Liao MD;  Location: [facility] OR;  Service:    • COLONOSCOPY N/A 01/31/2005    Colonic diverticulosis, internal hemorrhoids, and a few small colon polyps; Dr. Mushtaq Booth   • COLONOSCOPY N/A 1/24/2017    Procedure: COLONOSCOPY TO CECUM AND TERMINAL ILEUM WITH COLD BIOPSY AND HOT SNARE POLYPECTOMIES, INJECTED WITH NORMAL SALINE 3ML, INJECTED WITH SPOT INK 5ML, AND BIOPSIES;  Surgeon: Mushtaq Booth MD;  Location: [facility] ENDOSCOPY;  Service:    • COLONOSCOPY N/A 4/25/2017    Procedure:  COLONOSCOPY to cecum and terminal ileum with cold polypectomy, and biopsies and tattoo (5cc) to mass at 75cm ;  Surgeon: Mushtaq Booth MD;  Location: [facility] ENDOSCOPY;  Service:    • COLONOSCOPY N/A 2018    Procedure: COLONOSCOPY TO ANASTOMOSIS AND INTO TERMINAL ILEUM;  Surgeon: Pollo Liao MD;  Location: [facility] ENDOSCOPY;  Service: Gastroenterology   • CORONARY ARTERY BYPASS GRAFT N/A 2011    Transesophageal echo, endoscopic vein harvest, coronary artery bypass graft x5, left internal mammary graft to the LAD, vein graft to RV branch, vein graft ot distal posterior descending artery, second marginal branch of the circumflex, third marginal branch of the circumflex, temporary cardiopulmonary bypass, antegrade and retrograde cold blood cardioplegia, warm reperfusion-Dr. Brandon Bucio   • NOSE SURGERY      Laser Suite Ret Treatment Pan-Ablated Inferior Nasal Retina   • SHOULDER MANIPULATION Left 2004    Manipulation under anesthesia and injection of left shoulder-Dr. INA Ceja   • TONSILLECTOMY N/A        Family History   Problem Relation Age of Onset   • Heart attack Father    • Heart disease Father    • Hypertension Other    • Malig Hyperthermia Neg Hx        Social History     Socioeconomic History   • Marital status:      Spouse name: Not on file   • Number of children: Not on file   • Years of education: Not on file   • Highest education level: Not on file   Social Needs   • Financial resource strain: Not on file   • Food insecurity - worry: Not on file   • Food insecurity - inability: Not on file   • Transportation needs - medical: Not on file   • Transportation needs - non-medical: Not on file   Occupational History   • Not on file   Tobacco Use   • Smoking status: Former Smoker     Years: 20.00     Last attempt to quit: 3/8/1986     Years since quittin.9   • Smokeless tobacco: Never Used   Substance and Sexual Activity   • Alcohol use: Yes     Comment: occasionally    • Drug use: No   • Sexual activity: Defer   Other Topics Concern   • Not on file   Social History Narrative   • Not on file         Physical Exam   Constitutional: He is oriented to person, place, and time. He appears well-developed and well-nourished. No distress.   HENT:   Head: Normocephalic and atraumatic.   Nose: Nose normal.   Mouth/Throat: Oropharynx is clear and moist. No oropharyngeal exudate.   Eyes: Conjunctivae and EOM are normal. Pupils are equal, round, and reactive to light.   Neck: Normal range of motion. No thyromegaly present.   Cardiovascular: Normal rate, regular rhythm and normal heart sounds.   Pulmonary/Chest: Effort normal. No respiratory distress.   Abdominal: Soft. He exhibits no distension. There is no guarding.   Musculoskeletal: Normal range of motion. He exhibits no edema or deformity.        Left wrist: He exhibits tenderness. He exhibits normal range of motion.        Arms:  Lymphadenopathy:     He has no cervical adenopathy.   Neurological: He is alert and oriented to person, place, and time. He displays normal reflexes. Coordination normal.   Skin: Skin is warm and dry.   Psychiatric: He has a normal mood and affect. His behavior is normal. Judgment and thought content normal.   Nursing note and vitals reviewed.        Assessment/Plan    Eric was seen today for hyperlipidemia, hypertension and numbness.    Diagnoses and all orders for this visit:    Controlled type 2 diabetes mellitus without complication, with long-term current use of insulin (CMS/[facility])    Acquired hypothyroidism  -     TSH+Free T4    High risk medication use  -     Basic Metabolic Panel    Anemia, unspecified type  -     CBC & Differential    Uncontrolled type 2 diabetes mellitus with hyperglycemia (CMS/[facility])  -     Basic Metabolic Panel  -     Hemoglobin A1c    Carpal tunnel syndrome of left wrist  -     Ambulatory Referral to Hand Surgery        Patient here especially for diabetes hypothyroidism and other  medical issues noted above.  Reports several week history of numbness and tingling of left hand and does have exacerbation by tapping on median nerve.  Discussed increased risk of carpal tunnel with diabetes and hypothyroidism.  Briefly discussed treatment options and reassured not related to his cardiac disease.  Also discussed recommendations for Tdap which will be given.    This note includes information entered using a voice recognition dictation system.  Though reviewed, some nonsensible errors may remain.

## 2019-01-24 DIAGNOSIS — E11.9 CONTROLLED TYPE 2 DIABETES MELLITUS WITHOUT COMPLICATION, WITH LONG-TERM CURRENT USE OF INSULIN (HCC): ICD-10-CM

## 2019-01-24 DIAGNOSIS — I25.10 CORONARY ARTERIOSCLEROSIS IN NATIVE ARTERY: ICD-10-CM

## 2019-01-24 DIAGNOSIS — Z79.4 CONTROLLED TYPE 2 DIABETES MELLITUS WITHOUT COMPLICATION, WITH LONG-TERM CURRENT USE OF INSULIN (HCC): ICD-10-CM

## 2019-01-28 ENCOUNTER — TELEPHONE (OUTPATIENT)
Dept: FAMILY MEDICINE CLINIC | Facility: CLINIC | Age: 69
End: 2019-01-28

## 2019-01-28 DIAGNOSIS — Z79.4 CONTROLLED TYPE 2 DIABETES MELLITUS WITHOUT COMPLICATION, WITH LONG-TERM CURRENT USE OF INSULIN (HCC): ICD-10-CM

## 2019-01-28 DIAGNOSIS — I25.10 CORONARY ARTERIOSCLEROSIS IN NATIVE ARTERY: ICD-10-CM

## 2019-01-28 DIAGNOSIS — E11.9 CONTROLLED TYPE 2 DIABETES MELLITUS WITHOUT COMPLICATION, WITH LONG-TERM CURRENT USE OF INSULIN (HCC): ICD-10-CM

## 2019-01-28 NOTE — TELEPHONE ENCOUNTER
----- Message from Cynthia Knight sent at 1/28/2019  9:10 AM EST -----  PT SAID HE CALLED MAIL PHARM AND THEY HAVE NOT RECEIVED THIS PRESCRIPTION YET.  PLEASE RESEND.    Empagliflozin (JARDIANCE) 10 MG tablet   Sig: Take 10 mg by mouth Daily.      [facility] - [facility] [address] - [phone]  - [phone] FX

## 2019-02-12 ENCOUNTER — OFFICE VISIT (OUTPATIENT)
Dept: FAMILY MEDICINE CLINIC | Facility: CLINIC | Age: 69
End: 2019-02-12

## 2019-02-12 ENCOUNTER — HOSPITAL ENCOUNTER (OUTPATIENT)
Dept: GENERAL RADIOLOGY | Facility: HOSPITAL | Age: 69
Discharge: HOME | End: 2019-02-12
Admitting: FAMILY MEDICINE

## 2019-02-12 VITALS
RESPIRATION RATE: 16 BRPM | HEIGHT: 71 IN | TEMPERATURE: 98 F | WEIGHT: 221.6 LBS | DIASTOLIC BLOOD PRESSURE: 68 MMHG | OXYGEN SATURATION: 96 % | HEART RATE: 70 BPM | BODY MASS INDEX: 31.02 KG/M2 | SYSTOLIC BLOOD PRESSURE: 150 MMHG

## 2019-02-12 DIAGNOSIS — M54.14 THORACIC RADICULOPATHY: Primary | ICD-10-CM

## 2019-02-12 DIAGNOSIS — M54.14 THORACIC RADICULOPATHY: ICD-10-CM

## 2019-02-12 PROCEDURE — 99213 OFFICE O/P EST LOW 20 MIN: CPT | Performed by: FAMILY MEDICINE

## 2019-02-12 PROCEDURE — 72072 X-RAY EXAM THORAC SPINE 3VWS: CPT

## 2019-02-12 RX ORDER — METHYLPREDNISOLONE 4 MG/1
TABLET ORAL
Qty: 21 TABLET | Refills: 0 | Status: SHIPPED | OUTPATIENT
Start: 2019-02-12 | End: 2019-02-15

## 2019-02-12 NOTE — PROGRESS NOTES
HPI  Eric Liu is a 68 y.o. male who is here for pain in left arm.  He noticed this Sunday morning and does not know of any injury.  Seems to worsen when he twists thorax to the left.  Denies previous similar episodes though of interest is currently being evaluated for carpal tunnel syndrome in the left hand?  Fact is scheduled for nerve conduction studies in the future.  Does have known coronary artery disease but pain seems more musculoskeletal and not cardiac in nature.  Other health issues noted below including diabetes as well as colon resection etc.       Review of Systems   Constitutional: Negative for chills and fever.   Musculoskeletal: Positive for arthralgias. Negative for neck pain and neck stiffness.   Skin: Negative for rash.   All other systems reviewed and are negative.        Past Medical History:   Diagnosis Date   • Acute bronchitis, unspecified    • Anemia    • Atherosclerotic heart disease of native coronary artery without angina pectoris    • CAD (coronary atherosclerotic disease)    • Chest pain    • Colon polyp    • Colonic mass    • High risk medication use    • History of transfusion    • Hyperlipidemia    • Hyperplasia of prostate without urinary obstruction    • Hypertension    • Hypothyroidism (acquired)    • Mass of hepatic flexure of colon    • Myocardial infarction (CMS/HCC)    • Retinal hemorrhage    • Type 2 diabetes mellitus (CMS/HCC)        Past Surgical History:   Procedure Laterality Date   • CARDIAC CATHETERIZATION Left 01/27/2011    Left heart catheterization, coronary angiogarphy, left ventriculography-Dr. Sánchez Casanova   • CATARACT EXTRACTION Bilateral    • COLON RESECTION Right 6/1/2017    Procedure: LAPAROSCOPIC RIGHT COLON RESECTION;  Surgeon: Pollo Liao MD;  Location: [facility];  Service:    • COLONOSCOPY N/A 01/31/2005    Colonic diverticulosis, internal hemorrhoids, and a few small colon polyps; Dr. Mushtaq Booth   • COLONOSCOPY N/A 1/24/2017    Procedure:  COLONOSCOPY TO CECUM AND TERMINAL ILEUM WITH COLD BIOPSY AND HOT SNARE POLYPECTOMIES, INJECTED WITH NORMAL SALINE 3ML, INJECTED WITH SPOT INK 5ML, AND BIOPSIES;  Surgeon: Mushtaq Booth MD;  Location: [facility] ENDOSCOPY;  Service:    • COLONOSCOPY N/A 4/25/2017    Procedure: COLONOSCOPY to cecum and terminal ileum with cold polypectomy, and biopsies and tattoo (5cc) to mass at 75cm ;  Surgeon: Mushtaq Booth MD;  Location: [facility] ENDOSCOPY;  Service:    • COLONOSCOPY N/A 6/6/2018    Procedure: COLONOSCOPY TO ANASTOMOSIS AND INTO TERMINAL ILEUM;  Surgeon: Pollo Liao MD;  Location: [facility] ENDOSCOPY;  Service: Gastroenterology   • CORONARY ARTERY BYPASS GRAFT N/A 02/08/2011    Transesophageal echo, endoscopic vein harvest, coronary artery bypass graft x5, left internal mammary graft to the LAD, vein graft to RV branch, vein graft ot distal posterior descending artery, second marginal branch of the circumflex, third marginal branch of the circumflex, temporary cardiopulmonary bypass, antegrade and retrograde cold blood cardioplegia, warm reperfusion-Dr. Brandon Bucio   • NOSE SURGERY      Laser Suite Ret Treatment Pan-Ablated Inferior Nasal Retina   • SHOULDER MANIPULATION Left 09/01/2004    Manipulation under anesthesia and injection of left shoulder-Dr. INA Ceja   • TONSILLECTOMY N/A        Family History   Problem Relation Age of Onset   • Heart attack Father    • Heart disease Father    • Hypertension Other    • Malig Hyperthermia Neg Hx        Social History     Socioeconomic History   • Marital status:      Spouse name: Not on file   • Number of children: Not on file   • Years of education: Not on file   • Highest education level: Not on file   Social Needs   • Financial resource strain: Not on file   • Food insecurity - worry: Not on file   • Food insecurity - inability: Not on file   • Transportation needs - medical: Not on file   • Transportation needs - non-medical: Not on file    Occupational History   • Not on file   Tobacco Use   • Smoking status: Former Smoker     Years: 20.00     Last attempt to quit: 3/8/1986     Years since quittin.9   • Smokeless tobacco: Never Used   Substance and Sexual Activity   • Alcohol use: Yes     Comment: occasionally   • Drug use: No   • Sexual activity: Defer   Other Topics Concern   • Not on file   Social History Narrative   • Not on file         Physical Exam   Constitutional: He is oriented to person, place, and time. He appears well-developed and well-nourished. No distress.   HENT:   Head: Normocephalic.   Nose: Nose normal.   Mouth/Throat: Oropharynx is clear and moist. No oropharyngeal exudate.   Eyes: EOM are normal. Pupils are equal, round, and reactive to light.   Neck: Normal range of motion. No thyromegaly present.   Cardiovascular: Normal rate and regular rhythm.   Pulmonary/Chest: Effort normal. No respiratory distress.   Abdominal: Soft. He exhibits no distension. There is no tenderness.   Musculoskeletal: He exhibits no edema, tenderness or deformity.        Left shoulder: Normal. He exhibits normal range of motion, no bony tenderness, no deformity and no spasm.        Cervical back: He exhibits decreased range of motion.        Thoracic back: Normal. He exhibits normal range of motion, no tenderness, no swelling and no deformity.        Back:    Lymphadenopathy:     He has no cervical adenopathy.   Neurological: He is alert and oriented to person, place, and time.   Skin: Skin is warm and dry.   Psychiatric: He has a normal mood and affect. His behavior is normal. Judgment and thought content normal.   Nursing note and vitals reviewed.        Assessment/Plan    Eric was seen today for shoulder pain, arm pain and numbness.    Diagnoses and all orders for this visit:    Thoracic radiculopathy  -     XR spine thoracic 3 vw; Future  -     MethylPREDNISolone (MEDROL) 4 MG tablet; follow package directions      And presents with pain in  left arm which started spontaneously [place] morning.  Does seem to be exacerbated by twisting thoracic spine and suspect this is etiology of his pain.  Will get x-rays as noted above.  Medrol Dosepak trial will be given.  Discussed possibility of shingles and told to watch closely for development of rash.  Call if rash develops and will start antiviral medication?  Of note nerve conduction studies may be of interest to rule out spinal etiology for numbness in left hand?  Did inform patient Medrol will probably increase blood sugar.  Call if symptoms worsen or persist and also if rash develops as noted above.    This note includes information entered using a voice recognition dictation system.  Though reviewed, some nonsensible errors may remain.

## 2019-02-15 DIAGNOSIS — M54.12 CERVICAL RADICULAR PAIN: Primary | ICD-10-CM

## 2019-02-15 RX ORDER — CYCLOBENZAPRINE HCL 10 MG
TABLET ORAL
Qty: 30 TABLET | Refills: 2 | Status: SHIPPED | OUTPATIENT
Start: 2019-02-15 | End: 2019-02-15 | Stop reason: SDUPTHER

## 2019-02-15 RX ORDER — CYCLOBENZAPRINE HCL 10 MG
TABLET ORAL
Qty: 30 TABLET | Refills: 2 | Status: SHIPPED | OUTPATIENT
Start: 2019-02-15 | End: 2019-06-20

## 2019-02-25 DIAGNOSIS — M54.12 CERVICAL RADICULAR PAIN: Primary | ICD-10-CM

## 2019-02-28 DIAGNOSIS — M54.12 RADICULAR PAIN OF SHOULDER: Primary | ICD-10-CM

## 2019-02-28 RX ORDER — HYDROCODONE BITARTRATE AND ACETAMINOPHEN 5; 325 MG/1; MG/1
2 TABLET ORAL EVERY 6 HOURS PRN
Qty: 10 TABLET | Refills: 0 | Status: SHIPPED | OUTPATIENT
Start: 2019-02-28 | End: 2019-06-20

## 2019-03-04 DIAGNOSIS — M48.03 SPINAL STENOSIS OF CERVICOTHORACIC REGION: Primary | ICD-10-CM

## 2019-04-23 ENCOUNTER — OFFICE VISIT (OUTPATIENT)
Dept: FAMILY MEDICINE CLINIC | Facility: CLINIC | Age: 69
End: 2019-04-23

## 2019-04-23 VITALS
HEIGHT: 71 IN | WEIGHT: 222.4 LBS | OXYGEN SATURATION: 96 % | RESPIRATION RATE: 16 BRPM | HEART RATE: 60 BPM | BODY MASS INDEX: 31.14 KG/M2 | SYSTOLIC BLOOD PRESSURE: 138 MMHG | TEMPERATURE: 97.8 F | DIASTOLIC BLOOD PRESSURE: 60 MMHG

## 2019-04-23 DIAGNOSIS — Z79.899 HIGH RISK MEDICATION USE: ICD-10-CM

## 2019-04-23 DIAGNOSIS — E03.9 ACQUIRED HYPOTHYROIDISM: ICD-10-CM

## 2019-04-23 DIAGNOSIS — I25.10 CORONARY ARTERIOSCLEROSIS IN NATIVE ARTERY: ICD-10-CM

## 2019-04-23 DIAGNOSIS — E11.9 CONTROLLED TYPE 2 DIABETES MELLITUS WITHOUT COMPLICATION, WITH LONG-TERM CURRENT USE OF INSULIN (HCC): ICD-10-CM

## 2019-04-23 DIAGNOSIS — I10 ESSENTIAL HYPERTENSION: ICD-10-CM

## 2019-04-23 DIAGNOSIS — Z79.4 CONTROLLED TYPE 2 DIABETES MELLITUS WITHOUT COMPLICATION, WITH LONG-TERM CURRENT USE OF INSULIN (HCC): ICD-10-CM

## 2019-04-23 DIAGNOSIS — E78.5 HYPERLIPIDEMIA, UNSPECIFIED HYPERLIPIDEMIA TYPE: Primary | ICD-10-CM

## 2019-04-23 PROCEDURE — 99214 OFFICE O/P EST MOD 30 MIN: CPT | Performed by: FAMILY MEDICINE

## 2019-04-23 NOTE — PROGRESS NOTES
HPI  Eric Liu is a 68 y.o. male who is here for follow up of multiple ongoing medical issues including known coronary artery disease with history of previous surgery.  Also with history of diabetes and hyperlipidemia.  Patient had previous history of cervical radicular pain which spontaneously resolved and he therefore has canceled appointment with neurosurgeon.  This was discussed.  Also followed annually by cardiologist as well as urologist for prostate hyperplasia.  Urologist checks PSA as well as examination.      Review of Systems   Respiratory: Negative for shortness of breath.    Cardiovascular: Negative for chest pain, palpitations and leg swelling.   Musculoskeletal: Negative for neck pain.   All other systems reviewed and are negative.        Past Medical History:   Diagnosis Date   • Acute bronchitis, unspecified    • Anemia    • Atherosclerotic heart disease of native coronary artery without angina pectoris    • CAD (coronary atherosclerotic disease)    • Chest pain    • Colon polyp    • Colonic mass    • High risk medication use    • History of transfusion    • Hyperlipidemia    • Hyperplasia of prostate without urinary obstruction    • Hypertension    • Hypothyroidism (acquired)    • Mass of hepatic flexure of colon    • Myocardial infarction (CMS/HCC)    • Retinal hemorrhage    • Type 2 diabetes mellitus (CMS/HCC)        Past Surgical History:   Procedure Laterality Date   • CARDIAC CATHETERIZATION Left 01/27/2011    Left heart catheterization, coronary angiogarphy, left ventriculography-Dr. Sánchez Casanova   • CATARACT EXTRACTION Bilateral    • COLON RESECTION Right 6/1/2017    Procedure: LAPAROSCOPIC RIGHT COLON RESECTION;  Surgeon: Pollo Liao MD;  Location: [facility];  Service:    • COLONOSCOPY N/A 01/31/2005    Colonic diverticulosis, internal hemorrhoids, and a few small colon polyps; Dr. Mushtaq Booth   • COLONOSCOPY N/A 1/24/2017    Procedure: COLONOSCOPY TO CECUM AND TERMINAL ILEUM  WITH COLD BIOPSY AND HOT SNARE POLYPECTOMIES, INJECTED WITH NORMAL SALINE 3ML, INJECTED WITH SPOT INK 5ML, AND BIOPSIES;  Surgeon: Mushtaq Booth MD;  Location: [facility] ENDOSCOPY;  Service:    • COLONOSCOPY N/A 2017    Procedure: COLONOSCOPY to cecum and terminal ileum with cold polypectomy, and biopsies and tattoo (5cc) to mass at 75cm ;  Surgeon: Mushtaq Booth MD;  Location: [facility] ENDOSCOPY;  Service:    • COLONOSCOPY N/A 2018    Procedure: COLONOSCOPY TO ANASTOMOSIS AND INTO TERMINAL ILEUM;  Surgeon: Pollo Liao MD;  Location: [facility] ENDOSCOPY;  Service: Gastroenterology   • CORONARY ARTERY BYPASS GRAFT N/A 2011    Transesophageal echo, endoscopic vein harvest, coronary artery bypass graft x5, left internal mammary graft to the LAD, vein graft to RV branch, vein graft ot distal posterior descending artery, second marginal branch of the circumflex, third marginal branch of the circumflex, temporary cardiopulmonary bypass, antegrade and retrograde cold blood cardioplegia, warm reperfusion-Dr. Brandon Bucio   • NOSE SURGERY      Laser Suite Ret Treatment Pan-Ablated Inferior Nasal Retina   • SHOULDER MANIPULATION Left 2004    Manipulation under anesthesia and injection of left shoulder-Dr. INA Ceja   • TONSILLECTOMY N/A        Family History   Problem Relation Age of Onset   • Heart attack Father    • Heart disease Father    • Hypertension Other    • Malig Hyperthermia Neg Hx        Social History     Socioeconomic History   • Marital status:      Spouse name: Not on file   • Number of children: Not on file   • Years of education: Not on file   • Highest education level: Not on file   Tobacco Use   • Smoking status: Former Smoker     Years: 20.00     Last attempt to quit: 3/8/1986     Years since quittin.1   • Smokeless tobacco: Never Used   Substance and Sexual Activity   • Alcohol use: Yes     Comment: occasionally   • Drug use: No   • Sexual activity: Defer          Physical Exam   Constitutional: He is oriented to person, place, and time. He appears well-developed and well-nourished. No distress.   HENT:   Nose: Nose normal.   Mouth/Throat: Oropharynx is clear and moist. No oropharyngeal exudate.   Eyes: Conjunctivae and EOM are normal. Pupils are equal, round, and reactive to light.   Neck: Normal range of motion. No thyromegaly present.   Cardiovascular: Normal rate, regular rhythm and normal heart sounds.   Pulmonary/Chest: Effort normal and breath sounds normal. No respiratory distress.   Abdominal: Soft. He exhibits no distension and no mass. There is no tenderness.   Musculoskeletal: Normal range of motion. He exhibits no edema or deformity.   Lymphadenopathy:     He has no cervical adenopathy.   Neurological: He is alert and oriented to person, place, and time. He exhibits normal muscle tone. Coordination normal.   Skin: Skin is warm and dry. No rash noted.   Psychiatric: He has a normal mood and affect. His behavior is normal. Judgment and thought content normal.   Nursing note and vitals reviewed.        Assessment/Plan    Eric was seen today for diabetes.    Diagnoses and all orders for this visit:    Hyperlipidemia, unspecified hyperlipidemia type  -     Lipid Panel    Essential hypertension    Acquired hypothyroidism    Controlled type 2 diabetes mellitus without complication, with long-term current use of insulin (CMS/[facility])  -     Comprehensive Metabolic Panel  -     Hemoglobin A1c    Coronary arteriosclerosis in native artery    High risk medication use    Patient here for routine follow-up of multiple medical problems as noted above.  Overall seems to be doing extremely well on current regimen which will be continued.  Patient was started on Jardiance several months ago with improved A1c level.  If A1c level remains above 7 may increase Jardiance from 10 mg to 25 mg daily?  Otherwise continue current therapy with probable follow-up in 3 months.  Do  recommend checking with local pharmacy regarding shingles vaccines.    This note includes information entered using a voice recognition dictation system.  Though reviewed, some nonsensible errors may remain.

## 2019-04-24 LAB
ALBUMIN SERPL-MCNC: 4.5 G/DL (ref 3.5–5.2)
ALBUMIN/GLOB SERPL: 1.9 G/DL
ALP SERPL-CCNC: 68 U/L (ref 39–117)
ALT SERPL-CCNC: 22 U/L (ref 1–41)
AST SERPL-CCNC: 22 U/L (ref 1–40)
BILIRUB SERPL-MCNC: 0.4 MG/DL (ref 0.2–1.2)
BUN SERPL-MCNC: 13 MG/DL (ref 8–23)
BUN/CREAT SERPL: 12.1 (ref 7–25)
CALCIUM SERPL-MCNC: 9 MG/DL (ref 8.6–10.5)
CHLORIDE SERPL-SCNC: 106 MMOL/L (ref 98–107)
CHOLEST SERPL-MCNC: 129 MG/DL (ref 0–200)
CO2 SERPL-SCNC: 28.2 MMOL/L (ref 22–29)
CREAT SERPL-MCNC: 1.07 MG/DL (ref 0.76–1.27)
GLOBULIN SER CALC-MCNC: 2.4 GM/DL
GLUCOSE SERPL-MCNC: 77 MG/DL (ref 65–99)
HBA1C MFR BLD: 6.9 % (ref 4.8–5.6)
HDLC SERPL-MCNC: 39 MG/DL (ref 40–60)
LDLC SERPL CALC-MCNC: 64 MG/DL (ref 0–100)
POTASSIUM SERPL-SCNC: 4 MMOL/L (ref 3.5–5.2)
PROT SERPL-MCNC: 6.9 G/DL (ref 6–8.5)
SODIUM SERPL-SCNC: 144 MMOL/L (ref 136–145)
TRIGL SERPL-MCNC: 129 MG/DL (ref 0–150)
VLDLC SERPL CALC-MCNC: 25.8 MG/DL

## 2019-04-30 DIAGNOSIS — Z79.4 CONTROLLED TYPE 2 DIABETES MELLITUS WITHOUT COMPLICATION, WITH LONG-TERM CURRENT USE OF INSULIN (HCC): ICD-10-CM

## 2019-04-30 DIAGNOSIS — E11.9 CONTROLLED TYPE 2 DIABETES MELLITUS WITHOUT COMPLICATION, WITH LONG-TERM CURRENT USE OF INSULIN (HCC): ICD-10-CM

## 2019-04-30 DIAGNOSIS — I25.10 CORONARY ARTERIOSCLEROSIS IN NATIVE ARTERY: ICD-10-CM

## 2019-06-11 RX ORDER — LISINOPRIL 40 MG/1
TABLET ORAL
Qty: 180 TABLET | Refills: 0 | Status: SHIPPED | OUTPATIENT
Start: 2019-06-11 | End: 2019-08-14 | Stop reason: SDUPTHER

## 2019-06-20 ENCOUNTER — OFFICE VISIT (OUTPATIENT)
Dept: CARDIOLOGY | Facility: CLINIC | Age: 69
End: 2019-06-20

## 2019-06-20 ENCOUNTER — TRANSCRIBE ORDERS (OUTPATIENT)
Dept: ADMINISTRATIVE | Facility: HOSPITAL | Age: 69
End: 2019-06-20

## 2019-06-20 VITALS
DIASTOLIC BLOOD PRESSURE: 78 MMHG | SYSTOLIC BLOOD PRESSURE: 124 MMHG | HEIGHT: 71 IN | HEART RATE: 66 BPM | WEIGHT: 220.6 LBS | BODY MASS INDEX: 30.88 KG/M2

## 2019-06-20 DIAGNOSIS — Z13.6 ENCOUNTER FOR SCREENING FOR VASCULAR DISEASE: ICD-10-CM

## 2019-06-20 DIAGNOSIS — Z12.39 SCREENING BREAST EXAMINATION: Primary | ICD-10-CM

## 2019-06-20 DIAGNOSIS — Z79.4 CONTROLLED TYPE 2 DIABETES MELLITUS WITHOUT COMPLICATION, WITH LONG-TERM CURRENT USE OF INSULIN (HCC): ICD-10-CM

## 2019-06-20 DIAGNOSIS — I25.10 CORONARY ARTERIOSCLEROSIS IN NATIVE ARTERY: ICD-10-CM

## 2019-06-20 DIAGNOSIS — I25.810 CORONARY ARTERY DISEASE INVOLVING CORONARY BYPASS GRAFT OF NATIVE HEART WITHOUT ANGINA PECTORIS: Primary | ICD-10-CM

## 2019-06-20 DIAGNOSIS — E11.9 CONTROLLED TYPE 2 DIABETES MELLITUS WITHOUT COMPLICATION, WITH LONG-TERM CURRENT USE OF INSULIN (HCC): ICD-10-CM

## 2019-06-20 DIAGNOSIS — I10 ESSENTIAL HYPERTENSION: ICD-10-CM

## 2019-06-20 PROCEDURE — 99213 OFFICE O/P EST LOW 20 MIN: CPT | Performed by: INTERNAL MEDICINE

## 2019-06-20 PROCEDURE — 93000 ELECTROCARDIOGRAM COMPLETE: CPT | Performed by: INTERNAL MEDICINE

## 2019-06-20 RX ORDER — AMLODIPINE BESYLATE 5 MG/1
TABLET ORAL
Qty: 135 TABLET | Refills: 3 | Status: SHIPPED | OUTPATIENT
Start: 2019-06-20 | End: 2020-02-12 | Stop reason: SDUPTHER

## 2019-06-20 NOTE — PROGRESS NOTES
Date of Office Visit: 2019  Encounter Provider: Sharri Garcia MD  Place of Service: [facility] CARDIOLOGY  Patient Name: Eric Liu  :1950    Chief complaint  Follow-up of coronary artery disease, carotid artery disease    History of Present Illness  The patient is a pleasant 67-year-old gentleman with diabetes, hypertension,  hyperlipidemia, who has a history of coronary artery bypass grafting in  with prior inferior wall infarction. He has a normal systolic function. He underwent coronary artery bypass grafting with a LIMA graft to the LAD, vein graft to the RV branch, as well as a vein graft to the distal PDA and second obtuse marginal branch, circumflex artery and third obtuse marginal branch. He had postoperative fluid retention that resolved with diuretics. He has had intermittent chest pain since then and has had a couple of stress tests since then. His last stress test in 2013 revealed a small inferior wall infarction with minimal amount of richard-infarct ischemia. His ejection fraction was felt to be possibly reduced at 45%. It was unchanged from his prior study a year earlier. However, he had an echocardiogram that revealed normal left ventricular size and function with hypokinesis of the inferior wall with a 61% ejection fraction. There was grade I diastolic dysfunction, moderate left ventricular hypertrophy, no significant valvular dysfunction. He was treated medically.  2016 he was seen for worsening shortness of breath and fatigue a stress echocardiogram revealed no ischemia at a good workload with no significant valvular dysfunction and with normal systolic function.    Last visit he is walking 4-5 times a week 3 miles in 40 minutes each time.  He denies any chest pain, shortness of breath, palpitations, syncope near syncope.  His blood pressure has been controlled.  Was checked in 2019 were fairly stable with an HDL of 39    Past Medical  History:   Diagnosis Date   • Acute bronchitis, unspecified    • Anemia    • Atherosclerotic heart disease of native coronary artery without angina pectoris    • CAD (coronary atherosclerotic disease)    • Chest pain    • Colon polyp    • Colonic mass    • High risk medication use    • History of transfusion    • Hyperlipidemia    • Hyperplasia of prostate without urinary obstruction    • Hypertension    • Hypothyroidism (acquired)    • Mass of hepatic flexure of colon    • Myocardial infarction (CMS/HCC)    • Retinal hemorrhage    • Type 2 diabetes mellitus (CMS/HCC)      Past Surgical History:   Procedure Laterality Date   • CARDIAC CATHETERIZATION Left 01/27/2011    Left heart catheterization, coronary angiogarphy, left ventriculography-Dr. Sánchez Casanova   • CATARACT EXTRACTION Bilateral    • COLON RESECTION Right 6/1/2017    Procedure: LAPAROSCOPIC RIGHT COLON RESECTION;  Surgeon: Pollo Liao MD;  Location: [facility] OR;  Service:    • COLONOSCOPY N/A 01/31/2005    Colonic diverticulosis, internal hemorrhoids, and a few small colon polyps; Dr. Mushtaq Booth   • COLONOSCOPY N/A 1/24/2017    Procedure: COLONOSCOPY TO CECUM AND TERMINAL ILEUM WITH COLD BIOPSY AND HOT SNARE POLYPECTOMIES, INJECTED WITH NORMAL SALINE 3ML, INJECTED WITH SPOT INK 5ML, AND BIOPSIES;  Surgeon: Mushtaq Booth MD;  Location: [facility] ENDOSCOPY;  Service:    • COLONOSCOPY N/A 4/25/2017    Procedure: COLONOSCOPY to cecum and terminal ileum with cold polypectomy, and biopsies and tattoo (5cc) to mass at 75cm ;  Surgeon: Mushtaq Booth MD;  Location: [facility] ENDOSCOPY;  Service:    • COLONOSCOPY N/A 6/6/2018    Procedure: COLONOSCOPY TO ANASTOMOSIS AND INTO TERMINAL ILEUM;  Surgeon: Pollo Liao MD;  Location: [facility] ENDOSCOPY;  Service: Gastroenterology   • CORONARY ARTERY BYPASS GRAFT N/A 02/08/2011    Transesophageal echo, endoscopic vein harvest, coronary artery bypass graft x5, left internal mammary graft to the LAD, vein graft to RV  "branch, vein graft ot distal posterior descending artery, second marginal branch of the circumflex, third marginal branch of the circumflex, temporary cardiopulmonary bypass, antegrade and retrograde cold blood cardioplegia, warm reperfusion-Dr. Brandon Bucio   • NOSE SURGERY      Laser Suite Ret Treatment Pan-Ablated Inferior Nasal Retina   • SHOULDER MANIPULATION Left 09/01/2004    Manipulation under anesthesia and injection of left shoulder-Dr. INA Ceja   • TONSILLECTOMY N/A      Outpatient Medications Prior to Visit   Medication Sig Dispense Refill   • aspirin 325 MG tablet Take 325 mg by mouth Daily. HOLD PER MD INSTR-STOPPED 5/27/2017.     • atorvastatin (LIPITOR) 80 MG tablet TAKE ONE-HALF TABLET BY MOUTH DAILY. 135 tablet 3   • carvedilol (COREG) 25 MG tablet Take 1 tablet by mouth 2 (Two) Times a Day. 180 tablet 3   • Empagliflozin (JARDIANCE) 25 MG tablet Take 12.5 mg by mouth Daily. As directed 90 tablet 3   • insulin NPH-insulin regular (NOVOLIN 70/30) (70-30) 100 UNIT/ML injection Inject 40 Units under the skin into the appropriate area as directed 2 (Two) Times a Day With Meals. Dx: E11.9 80 mL 3   • Insulin Syringe-Needle U-100 30G X 1/2\" 0.5 ML misc 1 each 2 (Two) Times a Day. 200 each 3   • levothyroxine (SYNTHROID, LEVOTHROID) 112 MCG tablet Take 1 tablet by mouth Daily. *needs appt 90 tablet 3   • lisinopril (PRINIVIL,ZESTRIL) 40 MG tablet TAKE 1 TABLET TWICE DAILY 180 tablet 0   • metFORMIN (GLUCOPHAGE) 1000 MG tablet TAKE ONE TABLET BY MOUTH TWICE A DAY WITH MEALS 60 tablet 0   • Omega-3 Fatty Acids (FISH OIL) 1000 MG capsule capsule Take 1,000 mg by mouth Daily With Breakfast. HOLD PER MD INSTR-STOP 5/30/2017     • amLODIPine (NORVASC) 5 MG tablet Take 0.5 tablets by mouth Every Morning. 5mg in the  tablet 1   • cyclobenzaprine (FLEXERIL) 10 MG tablet 1/2-1 tab po qhs 30 tablet 2   • HYDROcodone-acetaminophen (NORCO) 5-325 MG per tablet Take 2 tablets by mouth Every 6 (Six) " Hours As Needed for Severe Pain . 10 tablet 0     No facility-administered medications prior to visit.        Allergies as of 2019   • (No Known Allergies)     Social History     Socioeconomic History   • Marital status:      Spouse name: Not on file   • Number of children: Not on file   • Years of education: Not on file   • Highest education level: Not on file   Tobacco Use   • Smoking status: Former Smoker     Years: 20.00     Last attempt to quit: 3/8/1986     Years since quittin.3   • Smokeless tobacco: Never Used   • Tobacco comment: Daily caffeine use   Substance and Sexual Activity   • Alcohol use: Yes     Comment: occasionally   • Drug use: No   • Sexual activity: Defer     Family History   Problem Relation Age of Onset   • Heart attack Father    • Heart disease Father    • Hypertension Other    • Malig Hyperthermia Neg Hx      Review of Systems   Constitution: Negative for fever, malaise/fatigue, weight gain and weight loss.   HENT: Negative for ear pain, hearing loss, nosebleeds and sore throat.    Eyes: Negative for double vision, pain, vision loss in left eye and vision loss in right eye.   Cardiovascular:        See history of present illness.   Respiratory: Positive for cough. Negative for shortness of breath, sleep disturbances due to breathing, snoring and wheezing.    Endocrine: Negative for cold intolerance, heat intolerance and polyuria.   Skin: Negative for itching, poor wound healing and rash.   Musculoskeletal: Negative for joint pain, joint swelling and myalgias.   Gastrointestinal: Negative for abdominal pain, diarrhea, hematochezia, nausea and vomiting.   Genitourinary: Negative for hematuria and hesitancy.   Neurological: Negative for numbness, paresthesias and seizures.   Psychiatric/Behavioral: Negative for depression. The patient is not nervous/anxious.         Objective:     Vitals:    19 1032   BP: 124/78   Pulse: 66   Weight: 100 kg (220 lb 9.6 oz)   Height:  "180.3 cm (71\")     Body mass index is 30.77 kg/m².    Physical Exam   Constitutional: He is oriented to person, place, and time. He appears well-developed and well-nourished.   HENT:   Head: Normocephalic.   Nose: Nose normal.   Mouth/Throat: Oropharynx is clear and moist.   Eyes: Conjunctivae and EOM are normal. Pupils are equal, round, and reactive to light. Right eye exhibits no discharge. No scleral icterus.   Neck: Normal range of motion. Neck supple. No JVD present. No thyromegaly present.   Cardiovascular: Normal rate, regular rhythm, normal heart sounds and intact distal pulses. Exam reveals no gallop and no friction rub.   No murmur heard.  Pulses:       Carotid pulses are 2+ on the right side, and 2+ on the left side.       Radial pulses are 2+ on the right side, and 2+ on the left side.        Femoral pulses are 2+ on the right side, and 2+ on the left side.       Popliteal pulses are 2+ on the right side, and 2+ on the left side.        Dorsalis pedis pulses are 2+ on the right side, and 0 on the left side.        Posterior tibial pulses are 2+ on the right side, and 1+ on the left side.   Pulmonary/Chest: Effort normal and breath sounds normal. No respiratory distress. He has no wheezes. He has no rales.   Abdominal: Soft. Bowel sounds are normal. He exhibits no distension. There is no hepatosplenomegaly. There is no tenderness. There is no rebound.   Musculoskeletal: Normal range of motion. He exhibits no edema or tenderness.   Neurological: He is alert and oriented to person, place, and time.   Skin: Skin is warm and dry. No rash noted. No erythema.   Psychiatric: He has a normal mood and affect. His behavior is normal. Judgment and thought content normal.   Vitals reviewed.    Lab Review:     ECG 12 Lead  Date/Time: 6/20/2019 10:33 AM  Performed by: Sharri Garcia MD  Authorized by: Sharri Garcia MD   Comparison: compared with previous ECG   Rhythm: sinus rhythm  Conduction: right bundle branch " block    Clinical impression: abnormal EKG          Assessment:       Diagnosis Plan   1. Coronary artery disease involving coronary bypass graft of native heart without angina pectoris  ECG 12 Lead   2. Encounter for screening for vascular disease  Vascular Screening   3. Essential hypertension     4. Coronary arteriosclerosis in native artery     5. Controlled type 2 diabetes mellitus without complication, with long-term current use of insulin (CMS/[facility])       Plan:       1.  Coronary artery disease with history of prior myocardial infarction and coronary artery bypass grafting.  With a negative stress test in 2016 Plan on a stress test in 1 year  2.  Carotid artery disease, negative carotid Doppler and March 2016.  Vascular screening study ordered.  3.  Hypertension.  Controlled  4.  Hyperlipidemia.  Blood work in April 2019 with reasonable lipid control with an HDL of 39 but otherwise acceptable panel.  5.  Diabetes  6.  Recent colonoscopy, neg for recurrent nonmalignant mass that was previously removed in 2017  7.  Decreased pulse in the left leg however has no claudication symptoms.  Prior ALYX was negative several years ago.  We will check a vascular screening study    Coronary Artery Disease  Assessment  • The patient has no angina    Plan  • Lifestyle modifications discussed include adhering to a heart healthy diet, maintenance of a healthy weight, regular exercise and regular monitoring of cholesterol and blood pressure    Subjective - Objective  • There is a history of past MI  • There is a history of previous coronary artery bypass graft  • Current antiplatelet therapy includes aspirin 325 mg         Your medication list           Accurate as of 6/20/19 11:59 PM. If you have any questions, ask your nurse or doctor.               CHANGE how you take these medications      Instructions Last Dose Given Next Dose Due   amLODIPine 5 MG tablet  Commonly known as:  NORVASC  What changed:  See the new  "instructions.  Changed by:  Sharri Garcia MD      TAKE 1/2 TABLET EVERY MORNING  AND TAKE 1 TABLET EVERY EVENING          CONTINUE taking these medications      Instructions Last Dose Given Next Dose Due   aspirin 325 MG tablet      Take 325 mg by mouth Daily. HOLD PER MD INSTR-STOPPED 5/27/2017.       atorvastatin 80 MG tablet  Commonly known as:  LIPITOR      TAKE ONE-HALF TABLET BY MOUTH DAILY.       carvedilol 25 MG tablet  Commonly known as:  COREG      Take 1 tablet by mouth 2 (Two) Times a Day.       Empagliflozin 25 MG tablet  Commonly known as:  JARDIANCE      Take 12.5 mg by mouth Daily. As directed       fish oil 1000 MG capsule capsule      Take 1,000 mg by mouth Daily With Breakfast. HOLD PER MD INSTR-STOP 5/30/2017       insulin NPH-insulin regular (70-30) 100 UNIT/ML injection  Commonly known as:  NOVOLIN 70/30      Inject 40 Units under the skin into the appropriate area as directed 2 (Two) Times a Day With Meals. Dx: E11.9       Insulin Syringe-Needle U-100 30G X 1/2\" 0.5 ML misc      1 each 2 (Two) Times a Day.       levothyroxine 112 MCG tablet  Commonly known as:  SYNTHROID, LEVOTHROID      Take 1 tablet by mouth Daily. *needs appt       lisinopril 40 MG tablet  Commonly known as:  PRINIVIL,ZESTRIL      TAKE 1 TABLET TWICE DAILY       metFORMIN 1000 MG tablet  Commonly known as:  GLUCOPHAGE      TAKE ONE TABLET BY MOUTH TWICE A DAY WITH MEALS          STOP taking these medications    cyclobenzaprine 10 MG tablet  Commonly known as:  FLEXERIL  Stopped by:  Sharri Garcia MD        HYDROcodone-acetaminophen 5-325 MG per tablet  Commonly known as:  NORCO  Stopped by:  Sharri Garcia MD              Where to Get Your Medications      These medications were sent to [facility] - [place], [place] - [address] - [phone] [facility] [phone] FX  [address], [facility] [address]    Phone:  [phone]   · amLODIPine 5 MG tablet         Patient is no longer taking -.  I " corrected the med list to reflect this.  I did not stop these medications.    Dictated utilizing Dragon dictation

## 2019-06-22 PROBLEM — Z13.6 ENCOUNTER FOR SCREENING FOR VASCULAR DISEASE: Status: ACTIVE | Noted: 2019-06-22

## 2019-06-22 PROBLEM — I25.810 CORONARY ARTERY DISEASE INVOLVING CORONARY BYPASS GRAFT OF NATIVE HEART WITHOUT ANGINA PECTORIS: Status: ACTIVE | Noted: 2019-06-22

## 2019-06-24 ENCOUNTER — HOSPITAL ENCOUNTER (OUTPATIENT)
Dept: CARDIOLOGY | Facility: HOSPITAL | Age: 69
Discharge: HOME | End: 2019-06-24
Admitting: INTERNAL MEDICINE

## 2019-06-24 VITALS
HEART RATE: 70 BPM | SYSTOLIC BLOOD PRESSURE: 164 MMHG | HEIGHT: 71 IN | BODY MASS INDEX: 30.52 KG/M2 | DIASTOLIC BLOOD PRESSURE: 72 MMHG | WEIGHT: 218 LBS

## 2019-06-24 DIAGNOSIS — Z12.39 SCREENING BREAST EXAMINATION: ICD-10-CM

## 2019-06-24 LAB
BH CV ECHO MEAS - DIST AO DIAM: 1.41 CM
BH CV VAS BP LEFT ARM: NORMAL MMHG
BH CV VAS BP RIGHT ARM: NORMAL MMHG
BH CV XLRA MEAS - MID AO DIAM: 1.48 CM
BH CV XLRA MEAS - PAD LEFT ABI DP: NORMAL
BH CV XLRA MEAS - PAD LEFT ABI PT: NORMAL
BH CV XLRA MEAS - PAD LEFT ARM: 160 MMHG
BH CV XLRA MEAS - PAD LEFT LEG DP: NORMAL MMHG
BH CV XLRA MEAS - PAD LEFT LEG PT: NORMAL MMHG
BH CV XLRA MEAS - PAD RIGHT ABI DP: NORMAL
BH CV XLRA MEAS - PAD RIGHT ABI PT: NORMAL
BH CV XLRA MEAS - PAD RIGHT ARM: 164 MMHG
BH CV XLRA MEAS - PAD RIGHT LEG DP: NORMAL MMHG
BH CV XLRA MEAS - PAD RIGHT LEG PT: NORMAL MMHG
BH CV XLRA MEAS - PROX AO DIAM: 1.58 CM
BH CV XLRA MEAS LEFT ICA/CCA RATIO: 1.28
BH CV XLRA MEAS LEFT MID CCA PSV: NORMAL CM/SEC
BH CV XLRA MEAS LEFT MID ICA PSV: NORMAL CM/SEC
BH CV XLRA MEAS LEFT PROX ECA PSV: NORMAL CM/SEC
BH CV XLRA MEAS RIGHT ICA/CCA RATIO: 1.39
BH CV XLRA MEAS RIGHT MID CCA PSV: NORMAL CM/SEC
BH CV XLRA MEAS RIGHT MID ICA PSV: NORMAL CM/SEC
BH CV XLRA MEAS RIGHT PROX ECA PSV: NORMAL CM/SEC

## 2019-06-24 PROCEDURE — 93799 UNLISTED CV SVC/PROCEDURE: CPT

## 2019-07-01 ENCOUNTER — TELEPHONE (OUTPATIENT)
Dept: CARDIOLOGY | Facility: CLINIC | Age: 69
End: 2019-07-01

## 2019-07-02 NOTE — TELEPHONE ENCOUNTER
Please let the patient know normal blood flow to the legs and no abdominal  aneurysm. Carotid doppler with < 50% (no significant stenosis) plaque. Plan a repeat doppler in 2 years. pako

## 2019-07-24 ENCOUNTER — OFFICE VISIT (OUTPATIENT)
Dept: FAMILY MEDICINE CLINIC | Facility: CLINIC | Age: 69
End: 2019-07-24

## 2019-07-24 VITALS
BODY MASS INDEX: 30.66 KG/M2 | OXYGEN SATURATION: 97 % | TEMPERATURE: 98.2 F | HEIGHT: 71 IN | HEART RATE: 62 BPM | WEIGHT: 219 LBS | DIASTOLIC BLOOD PRESSURE: 82 MMHG | SYSTOLIC BLOOD PRESSURE: 120 MMHG

## 2019-07-24 DIAGNOSIS — E11.9 CONTROLLED TYPE 2 DIABETES MELLITUS WITHOUT COMPLICATION, WITH LONG-TERM CURRENT USE OF INSULIN (HCC): Primary | ICD-10-CM

## 2019-07-24 DIAGNOSIS — I25.10 CORONARY ARTERIOSCLEROSIS IN NATIVE ARTERY: ICD-10-CM

## 2019-07-24 DIAGNOSIS — E03.9 ACQUIRED HYPOTHYROIDISM: ICD-10-CM

## 2019-07-24 DIAGNOSIS — E78.5 HYPERLIPIDEMIA, UNSPECIFIED HYPERLIPIDEMIA TYPE: ICD-10-CM

## 2019-07-24 DIAGNOSIS — Z79.4 CONTROLLED TYPE 2 DIABETES MELLITUS WITHOUT COMPLICATION, WITH LONG-TERM CURRENT USE OF INSULIN (HCC): Primary | ICD-10-CM

## 2019-07-24 PROCEDURE — 99213 OFFICE O/P EST LOW 20 MIN: CPT | Performed by: FAMILY MEDICINE

## 2019-07-24 NOTE — PROGRESS NOTES
HPI  Eric Liu is a 69 y.o. male who is here for follow up diabetes known vascular disease.  Overall seems to be doing excellent on current regimen.  Has been monitoring blood sugars and reports since starting Jardiance has been under good control.  Unfortunately is out of the donut hole and Jardiance is costing several $100 a month.  Currently taking one half 25 mg tablet.      Review of Systems   All other systems reviewed and are negative.        Past Medical History:   Diagnosis Date   • Acute bronchitis, unspecified    • Anemia    • Atherosclerotic heart disease of native coronary artery without angina pectoris    • CAD (coronary atherosclerotic disease)    • Chest pain    • Colon polyp    • Colonic mass    • High risk medication use    • History of transfusion    • Hyperlipidemia    • Hyperplasia of prostate without urinary obstruction    • Hypertension    • Hypothyroidism (acquired)    • Mass of hepatic flexure of colon    • Myocardial infarction (CMS/HCC)    • Retinal hemorrhage    • Type 2 diabetes mellitus (CMS/HCC)        Past Surgical History:   Procedure Laterality Date   • CARDIAC CATHETERIZATION Left 01/27/2011    Left heart catheterization, coronary angiogarphy, left ventriculography-Dr. Sánchez Casanova   • CATARACT EXTRACTION Bilateral    • COLON RESECTION Right 6/1/2017    Procedure: LAPAROSCOPIC RIGHT COLON RESECTION;  Surgeon: Pollo Liao MD;  Location: [facility] OR;  Service:    • COLONOSCOPY N/A 01/31/2005    Colonic diverticulosis, internal hemorrhoids, and a few small colon polyps; Dr. Mushtaq Booth   • COLONOSCOPY N/A 1/24/2017    Procedure: COLONOSCOPY TO CECUM AND TERMINAL ILEUM WITH COLD BIOPSY AND HOT SNARE POLYPECTOMIES, INJECTED WITH NORMAL SALINE 3ML, INJECTED WITH SPOT INK 5ML, AND BIOPSIES;  Surgeon: Mushtaq Booth MD;  Location: [facility] ENDOSCOPY;  Service:    • COLONOSCOPY N/A 4/25/2017    Procedure: COLONOSCOPY to cecum and terminal ileum with cold polypectomy, and biopsies and  tattoo (5cc) to mass at 75cm ;  Surgeon: Mushtaq Booth MD;  Location: [facility] ENDOSCOPY;  Service:    • COLONOSCOPY N/A 2018    Procedure: COLONOSCOPY TO ANASTOMOSIS AND INTO TERMINAL ILEUM;  Surgeon: Pollo Liao MD;  Location: [facility] ENDOSCOPY;  Service: Gastroenterology   • CORONARY ARTERY BYPASS GRAFT N/A 2011    Transesophageal echo, endoscopic vein harvest, coronary artery bypass graft x5, left internal mammary graft to the LAD, vein graft to RV branch, vein graft ot distal posterior descending artery, second marginal branch of the circumflex, third marginal branch of the circumflex, temporary cardiopulmonary bypass, antegrade and retrograde cold blood cardioplegia, warm reperfusion-Dr. Brandon Bucio   • NOSE SURGERY      Laser Suite Ret Treatment Pan-Ablated Inferior Nasal Retina   • SHOULDER MANIPULATION Left 2004    Manipulation under anesthesia and injection of left shoulder-Dr. INA Ceja   • TONSILLECTOMY N/A        Family History   Problem Relation Age of Onset   • Heart attack Father    • Heart disease Father    • Hypertension Other    • Malig Hyperthermia Neg Hx        Social History     Socioeconomic History   • Marital status:      Spouse name: Not on file   • Number of children: Not on file   • Years of education: Not on file   • Highest education level: Not on file   Tobacco Use   • Smoking status: Former Smoker     Years: 20.00     Last attempt to quit: 3/8/1986     Years since quittin.4   • Smokeless tobacco: Never Used   • Tobacco comment: Daily caffeine use   Substance and Sexual Activity   • Alcohol use: Yes     Comment: occasionally   • Drug use: No   • Sexual activity: Defer         Physical Exam   Constitutional: He is oriented to person, place, and time. He appears well-developed and well-nourished.   Eyes: Conjunctivae are normal. Pupils are equal, round, and reactive to light.   Neck: Normal range of motion.   Cardiovascular: Normal rate, regular  rhythm and normal heart sounds.   Pulmonary/Chest: Effort normal.   Abdominal: Soft. He exhibits no distension.   Musculoskeletal: Normal range of motion. He exhibits no edema or deformity.   Neurological: He is alert and oriented to person, place, and time. Coordination normal.   Skin: Skin is warm and dry.   Psychiatric: He has a normal mood and affect. His behavior is normal. Judgment and thought content normal.   Nursing note and vitals reviewed.        Assessment/Plan    Eric was seen today for hyperlipidemia.    Diagnoses and all orders for this visit:    Controlled type 2 diabetes mellitus without complication, with long-term current use of insulin (CMS/[facility])  -     metFORMIN (GLUCOPHAGE) 1000 MG tablet; Take 1 tablet by mouth 2 (Two) Times a Day With Meals.  -     Basic Metabolic Panel  -     Hemoglobin A1c    Acquired hypothyroidism  -     TSH+Free T4    Hyperlipidemia, unspecified hyperlipidemia type    Coronary arteriosclerosis in native artery      Patient here mostly for follow-up of diabetes.  Seems to be doing much better since additional medication started.  Main issue is cost as discussed above.  Even his insulin has become very expensive as previously discussed, even taking Novolin 70/30?  Will await above lab work and depending on results follow-up in 3 to 6 months.  Discussed recommendations for shingles vaccine but because of cost issues will need to check with his pharmacy regarding insurance coverage?    This note includes information entered using a voice recognition dictation system.  Though reviewed, some nonsensible errors may remain.

## 2019-07-25 LAB
BUN SERPL-MCNC: 19 MG/DL (ref 8–23)
BUN/CREAT SERPL: 18.3 (ref 7–25)
CALCIUM SERPL-MCNC: 9.2 MG/DL (ref 8.6–10.5)
CHLORIDE SERPL-SCNC: 99 MMOL/L (ref 98–107)
CO2 SERPL-SCNC: 25.6 MMOL/L (ref 22–29)
CREAT SERPL-MCNC: 1.04 MG/DL (ref 0.76–1.27)
GLUCOSE SERPL-MCNC: 168 MG/DL (ref 65–99)
HBA1C MFR BLD: 6.8 % (ref 4.8–5.6)
POTASSIUM SERPL-SCNC: 4.3 MMOL/L (ref 3.5–5.2)
SODIUM SERPL-SCNC: 141 MMOL/L (ref 136–145)
T4 FREE SERPL-MCNC: 1.42 NG/DL (ref 0.93–1.7)
TSH SERPL DL<=0.005 MIU/L-ACNC: 2.54 MIU/ML (ref 0.27–4.2)

## 2019-08-15 RX ORDER — LISINOPRIL 40 MG/1
TABLET ORAL
Qty: 180 TABLET | Refills: 3 | Status: SHIPPED | OUTPATIENT
Start: 2019-08-15 | End: 2020-08-17

## 2019-09-06 ENCOUNTER — ANESTHESIA (OUTPATIENT)
Dept: PERIOP | Facility: HOSPITAL | Age: 69
End: 2019-09-06

## 2019-09-06 ENCOUNTER — HOSPITAL ENCOUNTER (OUTPATIENT)
Facility: HOSPITAL | Age: 69
Setting detail: HOSPITAL OUTPATIENT SURGERY
Discharge: HOME | End: 2019-09-06
Attending: PLASTIC SURGERY | Admitting: PLASTIC SURGERY

## 2019-09-06 ENCOUNTER — ANESTHESIA EVENT (OUTPATIENT)
Dept: PERIOP | Facility: HOSPITAL | Age: 69
End: 2019-09-06

## 2019-09-06 VITALS
TEMPERATURE: 97.7 F | OXYGEN SATURATION: 94 % | DIASTOLIC BLOOD PRESSURE: 79 MMHG | HEART RATE: 58 BPM | SYSTOLIC BLOOD PRESSURE: 144 MMHG | RESPIRATION RATE: 16 BRPM

## 2019-09-06 LAB — GLUCOSE BLDC GLUCOMTR-MCNC: 113 MG/DL (ref 70–130)

## 2019-09-06 PROCEDURE — 82962 GLUCOSE BLOOD TEST: CPT

## 2019-09-06 PROCEDURE — 25010000002 ROPIVACAINE PER 1 MG: Performed by: ANESTHESIOLOGY

## 2019-09-06 PROCEDURE — 25010000002 MIDAZOLAM PER 1 MG: Performed by: ANESTHESIOLOGY

## 2019-09-06 PROCEDURE — 25010000002 PROPOFOL 10 MG/ML EMULSION: Performed by: NURSE ANESTHETIST, CERTIFIED REGISTERED

## 2019-09-06 PROCEDURE — 25010000002 MIDAZOLAM PER 1 MG: Performed by: NURSE ANESTHETIST, CERTIFIED REGISTERED

## 2019-09-06 RX ORDER — FLUMAZENIL 0.1 MG/ML
0.2 INJECTION INTRAVENOUS AS NEEDED
Status: CANCELLED | OUTPATIENT
Start: 2019-09-06

## 2019-09-06 RX ORDER — SODIUM CHLORIDE 0.9 % (FLUSH) 0.9 %
3-10 SYRINGE (ML) INJECTION AS NEEDED
Status: DISCONTINUED | OUTPATIENT
Start: 2019-09-06 | End: 2019-09-06 | Stop reason: HOSPADM

## 2019-09-06 RX ORDER — MIDAZOLAM HYDROCHLORIDE 1 MG/ML
1 INJECTION INTRAMUSCULAR; INTRAVENOUS
Status: DISCONTINUED | OUTPATIENT
Start: 2019-09-06 | End: 2019-09-06 | Stop reason: HOSPADM

## 2019-09-06 RX ORDER — SODIUM CHLORIDE, SODIUM LACTATE, POTASSIUM CHLORIDE, CALCIUM CHLORIDE 600; 310; 30; 20 MG/100ML; MG/100ML; MG/100ML; MG/100ML
9 INJECTION, SOLUTION INTRAVENOUS CONTINUOUS
Status: DISCONTINUED | OUTPATIENT
Start: 2019-09-06 | End: 2019-09-06 | Stop reason: HOSPADM

## 2019-09-06 RX ORDER — IBUPROFEN 600 MG/1
600 TABLET ORAL EVERY 6 HOURS PRN
Status: CANCELLED | OUTPATIENT
Start: 2019-09-06

## 2019-09-06 RX ORDER — PROPOFOL 10 MG/ML
VIAL (ML) INTRAVENOUS CONTINUOUS PRN
Status: DISCONTINUED | OUTPATIENT
Start: 2019-09-06 | End: 2019-09-06 | Stop reason: SURG

## 2019-09-06 RX ORDER — PROMETHAZINE HYDROCHLORIDE 25 MG/1
25 TABLET ORAL ONCE AS NEEDED
Status: CANCELLED | OUTPATIENT
Start: 2019-09-06

## 2019-09-06 RX ORDER — FAMOTIDINE 10 MG/ML
20 INJECTION, SOLUTION INTRAVENOUS ONCE
Status: COMPLETED | OUTPATIENT
Start: 2019-09-06 | End: 2019-09-06

## 2019-09-06 RX ORDER — HYDROCODONE BITARTRATE AND ACETAMINOPHEN 7.5; 325 MG/1; MG/1
1 TABLET ORAL ONCE AS NEEDED
Status: CANCELLED | OUTPATIENT
Start: 2019-09-06

## 2019-09-06 RX ORDER — LIDOCAINE HYDROCHLORIDE 10 MG/ML
0.5 INJECTION, SOLUTION EPIDURAL; INFILTRATION; INTRACAUDAL; PERINEURAL ONCE AS NEEDED
Status: DISCONTINUED | OUTPATIENT
Start: 2019-09-06 | End: 2019-09-06 | Stop reason: HOSPADM

## 2019-09-06 RX ORDER — HYDRALAZINE HYDROCHLORIDE 20 MG/ML
5 INJECTION INTRAMUSCULAR; INTRAVENOUS
Status: CANCELLED | OUTPATIENT
Start: 2019-09-06

## 2019-09-06 RX ORDER — FENTANYL CITRATE 50 UG/ML
50 INJECTION, SOLUTION INTRAMUSCULAR; INTRAVENOUS
Status: CANCELLED | OUTPATIENT
Start: 2019-09-06

## 2019-09-06 RX ORDER — OXYCODONE AND ACETAMINOPHEN 7.5; 325 MG/1; MG/1
1 TABLET ORAL ONCE AS NEEDED
Status: CANCELLED | OUTPATIENT
Start: 2019-09-06

## 2019-09-06 RX ORDER — HYDROCODONE BITARTRATE AND ACETAMINOPHEN 5; 325 MG/1; MG/1
1 TABLET ORAL ONCE AS NEEDED
Status: CANCELLED | OUTPATIENT
Start: 2019-09-06 | End: 2019-09-16

## 2019-09-06 RX ORDER — ONDANSETRON 2 MG/ML
4 INJECTION INTRAMUSCULAR; INTRAVENOUS ONCE AS NEEDED
Status: CANCELLED | OUTPATIENT
Start: 2019-09-06

## 2019-09-06 RX ORDER — MIDAZOLAM HYDROCHLORIDE 1 MG/ML
INJECTION INTRAMUSCULAR; INTRAVENOUS AS NEEDED
Status: DISCONTINUED | OUTPATIENT
Start: 2019-09-06 | End: 2019-09-06 | Stop reason: SURG

## 2019-09-06 RX ORDER — EPHEDRINE SULFATE 50 MG/ML
5 INJECTION, SOLUTION INTRAVENOUS ONCE AS NEEDED
Status: CANCELLED | OUTPATIENT
Start: 2019-09-06

## 2019-09-06 RX ORDER — ACETAMINOPHEN 650 MG/1
650 SUPPOSITORY RECTAL ONCE AS NEEDED
Status: CANCELLED | OUTPATIENT
Start: 2019-09-06

## 2019-09-06 RX ORDER — SODIUM CHLORIDE 0.9 % (FLUSH) 0.9 %
3 SYRINGE (ML) INJECTION EVERY 12 HOURS SCHEDULED
Status: DISCONTINUED | OUTPATIENT
Start: 2019-09-06 | End: 2019-09-06 | Stop reason: HOSPADM

## 2019-09-06 RX ORDER — MAGNESIUM HYDROXIDE 1200 MG/15ML
LIQUID ORAL AS NEEDED
Status: DISCONTINUED | OUTPATIENT
Start: 2019-09-06 | End: 2019-09-06 | Stop reason: HOSPADM

## 2019-09-06 RX ORDER — ROPIVACAINE HYDROCHLORIDE 5 MG/ML
INJECTION, SOLUTION EPIDURAL; INFILTRATION; PERINEURAL
Status: COMPLETED | OUTPATIENT
Start: 2019-09-06 | End: 2019-09-06

## 2019-09-06 RX ORDER — PROPOFOL 10 MG/ML
VIAL (ML) INTRAVENOUS AS NEEDED
Status: DISCONTINUED | OUTPATIENT
Start: 2019-09-06 | End: 2019-09-06 | Stop reason: SURG

## 2019-09-06 RX ORDER — DIPHENHYDRAMINE HYDROCHLORIDE 50 MG/ML
12.5 INJECTION INTRAMUSCULAR; INTRAVENOUS
Status: CANCELLED | OUTPATIENT
Start: 2019-09-06

## 2019-09-06 RX ORDER — PROMETHAZINE HYDROCHLORIDE 25 MG/ML
6.25 INJECTION, SOLUTION INTRAMUSCULAR; INTRAVENOUS
Status: CANCELLED | OUTPATIENT
Start: 2019-09-06

## 2019-09-06 RX ORDER — DIPHENHYDRAMINE HCL 25 MG
25 CAPSULE ORAL
Status: CANCELLED | OUTPATIENT
Start: 2019-09-06

## 2019-09-06 RX ORDER — LIDOCAINE HYDROCHLORIDE 5 MG/ML
INJECTION, SOLUTION INFILTRATION; INTRAVENOUS
Status: DISCONTINUED
Start: 2019-09-06 | End: 2019-09-06 | Stop reason: HOSPADM

## 2019-09-06 RX ORDER — PROMETHAZINE HYDROCHLORIDE 25 MG/ML
12.5 INJECTION, SOLUTION INTRAMUSCULAR; INTRAVENOUS ONCE AS NEEDED
Status: CANCELLED | OUTPATIENT
Start: 2019-09-06

## 2019-09-06 RX ORDER — LABETALOL HYDROCHLORIDE 5 MG/ML
5 INJECTION, SOLUTION INTRAVENOUS
Status: CANCELLED | OUTPATIENT
Start: 2019-09-06

## 2019-09-06 RX ORDER — BACITRACIN ZINC 500 [USP'U]/G
OINTMENT TOPICAL AS NEEDED
Status: DISCONTINUED | OUTPATIENT
Start: 2019-09-06 | End: 2019-09-06 | Stop reason: HOSPADM

## 2019-09-06 RX ORDER — HYDROCODONE BITARTRATE AND ACETAMINOPHEN 5; 325 MG/1; MG/1
1-2 TABLET ORAL EVERY 4 HOURS PRN
Qty: 30 TABLET | Refills: 0 | Status: SHIPPED | OUTPATIENT
Start: 2019-09-06 | End: 2020-01-24

## 2019-09-06 RX ORDER — FENTANYL CITRATE 50 UG/ML
50 INJECTION, SOLUTION INTRAMUSCULAR; INTRAVENOUS
Status: DISCONTINUED | OUTPATIENT
Start: 2019-09-06 | End: 2019-09-06 | Stop reason: HOSPADM

## 2019-09-06 RX ORDER — HYDROMORPHONE HYDROCHLORIDE 1 MG/ML
0.5 INJECTION, SOLUTION INTRAMUSCULAR; INTRAVENOUS; SUBCUTANEOUS
Status: CANCELLED | OUTPATIENT
Start: 2019-09-06

## 2019-09-06 RX ORDER — ACETAMINOPHEN 325 MG/1
650 TABLET ORAL ONCE AS NEEDED
Status: CANCELLED | OUTPATIENT
Start: 2019-09-06

## 2019-09-06 RX ORDER — MIDAZOLAM HYDROCHLORIDE 1 MG/ML
0.5 INJECTION INTRAMUSCULAR; INTRAVENOUS
Status: DISCONTINUED | OUTPATIENT
Start: 2019-09-06 | End: 2019-09-06 | Stop reason: HOSPADM

## 2019-09-06 RX ORDER — NALOXONE HCL 0.4 MG/ML
0.2 VIAL (ML) INJECTION AS NEEDED
Status: CANCELLED | OUTPATIENT
Start: 2019-09-06

## 2019-09-06 RX ORDER — PROMETHAZINE HYDROCHLORIDE 25 MG/1
25 SUPPOSITORY RECTAL ONCE AS NEEDED
Status: CANCELLED | OUTPATIENT
Start: 2019-09-06

## 2019-09-06 RX ADMIN — PROPOFOL 60 MG: 10 INJECTION, EMULSION INTRAVENOUS at 13:04

## 2019-09-06 RX ADMIN — SODIUM CHLORIDE, POTASSIUM CHLORIDE, SODIUM LACTATE AND CALCIUM CHLORIDE 9 ML/HR: 600; 310; 30; 20 INJECTION, SOLUTION INTRAVENOUS at 12:36

## 2019-09-06 RX ADMIN — PROPOFOL 140 MCG/KG/MIN: 10 INJECTION, EMULSION INTRAVENOUS at 13:05

## 2019-09-06 RX ADMIN — ROPIVACAINE HYDROCHLORIDE 20 ML: 5 INJECTION, SOLUTION EPIDURAL; INFILTRATION; PERINEURAL at 12:45

## 2019-09-06 RX ADMIN — MIDAZOLAM 1 MG: 1 INJECTION INTRAMUSCULAR; INTRAVENOUS at 13:07

## 2019-09-06 RX ADMIN — SODIUM CHLORIDE, POTASSIUM CHLORIDE, SODIUM LACTATE AND CALCIUM CHLORIDE: 600; 310; 30; 20 INJECTION, SOLUTION INTRAVENOUS at 13:00

## 2019-09-06 RX ADMIN — FAMOTIDINE 20 MG: 10 INJECTION, SOLUTION INTRAVENOUS at 12:54

## 2019-09-06 RX ADMIN — MIDAZOLAM 1 MG: 1 INJECTION INTRAMUSCULAR; INTRAVENOUS at 12:37

## 2019-09-06 RX ADMIN — MIDAZOLAM 1 MG: 1 INJECTION INTRAMUSCULAR; INTRAVENOUS at 13:02

## 2019-09-06 NOTE — ANESTHESIA POSTPROCEDURE EVALUATION
Patient: Eric ADAMSON Noe    Procedure Summary     Date:  09/06/19 Room / Location:   [facility]  [facility]    Anesthesia Start:  1301 Anesthesia Stop:      Procedure:  DECOMPRESSION OF CARPAL TUNNEL LEFT HAND (Left Hand) Diagnosis:      Surgeon:  Sánchez Malone MD Provider:  Kirstie Osman MD    Anesthesia Type:  regional ASA Status:  3          Anesthesia Type: regional  Last vitals  BP   144/79 (09/06/19 1405)   Temp   36.5 °C (97.7 °F) (09/06/19 1130)   Pulse   58 (09/06/19 1405)   Resp   16 (09/06/19 1405)     SpO2   94 % (09/06/19 1405)     Post Anesthesia Care and Evaluation    Patient location during evaluation: bedside  Patient participation: complete - patient participated  Level of consciousness: awake and alert  Pain management: adequate  Airway patency: patent  Anesthetic complications: No anesthetic complications  PONV Status: controlled  Cardiovascular status: acceptable  Respiratory status: acceptable  Hydration status: acceptable

## 2019-09-06 NOTE — ANESTHESIA PROCEDURE NOTES
Peripheral Block      Patient reassessed immediately prior to procedure    Start time: 9/6/2019 12:40 PM  Stop time: 9/6/2019 12:47 PM  Reason for block: primary anesthetic  Performed by  Anesthesiologist: Kirstie Osman MD  Preanesthetic Checklist  Completed: patient identified, site marked, surgical consent, pre-op evaluation, timeout performed, IV checked, risks and benefits discussed and monitors and equipment checked  Prep:  Pt Position: sitting  Sterile barriers:cap, gloves and sterile barriers  Prep: ChloraPrep  Patient monitoring: blood pressure monitoring, continuous pulse oximetry and EKG  Procedure  Sedation:yes  Performed under: local infiltration  Guidance:ultrasound guided  ULTRASOUND INTERPRETATION. Using ultrasound guidance a 22 G gauge needle was placed in close proximity to the nerve, at which point, under ultrasound guidance anesthetic was injected in the area of the nerve and spread of the anesthesia was seen on ultrasound in close proximity thereto.  There were no abnormalities seen on ultrasound; a digital image was taken; and the patient tolerated the procedure with no complications. Images:still images obtained, printed/placed on chart    Laterality:left  Block Type:wrist  Injection Technique:single-shot  Needle Type:echogenic  Needle Gauge:22 G  Resistance on Injection: none    Medications Used: ropivacaine (NAROPIN) 0.5 % injection, 20 mL  Med admintered at 9/6/2019 12:45 PM      Post Assessment  Injection Assessment: negative aspiration for heme, no paresthesia on injection and incremental injection  Patient Tolerance:comfortable throughout block  Complications:no

## 2019-09-06 NOTE — OP NOTE
Date of Procedure: September 6, 2019    PREOPERATIVE DIAGNOSIS: Carpal tunnel syndrome left hand    POSTOPERATIVE DIAGNOSIS: Carpal tunnel syndrome left hand    PROCEDURE: Decompression of the carpal tunnel left hand    SURGEON:  Sánchez Malone MD    ASSISTANT: 0    ANESTHESIA: Regional block of the left upper extremity    COMPLICATIONS: None    ESTIMATED BLOOD LOSS: Minimal    INDICATION FOR PROCEDURE: This 69-year-old gentleman developed numbness, tingling, and discomfort involving the thumb index and long fingers of his leftt hand.  EMG and nerve conduction studies supported the clinical diagnosis of compression of the median nerve in the carpal tunnel.  The treatment options were considered.  It was elected to proceed with the decompression of the carpal tunnel and the hand.  The potential risks and expected benefits of the operative procedure were discussed with the patient in the office.  []    DESCRIPTION OF PROCEDURE:    The patient was brought to the operating room and placed in the supine position.  A regional block of the left upper extremity was obtained by the anesthesiologist.  The left hand and forearm were prepped with surgical soap and sterilely draped.  The operative procedure was performed under the hemostatic control of a pneumatic tourniquet.    A marking pen was used to designate the proposed skin incision.  The skin incision was made with a #15 scalpel blade.  Hemostasis was achieved with electrocautery.  The wound margins were retracted and the deeper structures were examined under 3.5 power loupe magnification.    The palmar aponeurosis was identified and divided.  Transverse carpal ligament was identified and divided.  This division was carried proximally and distally with the tenotomy scissors.  Care was taken to avoid injury of the palmar cutaneous and recurrent branches of the median nerve.  The median nerve was completely decompressed at the completion of this portion of the operative  procedure.    Median nerve was retracted and the thickened and opaque tenosynovium was excised from the flexor tendons.    The operative site was copiously irrigated with antibiotic solution.    The wound margins were reapproximated with interrupted 5-0 black nylon suture.    The pneumatic tourniquet was deflated and adequate reperfusion of the fingers and thumb was noted.    Bacitracin ointment, Adaptic, and a soft compression dressing were applied.  A volar fiberglass splint was finally applied.    Patient tolerated this operation well and left the operating room in stable condition.

## 2019-09-06 NOTE — BRIEF OP NOTE
CARPAL TUNNEL RELEASE  Progress Note    Eric ADAMSON Noe  9/6/2019    Pre-op Diagnosis:   Carpal Tunnel Syndrome Left Hand       Post-Op Diagnosis Codes:   same    Procedure/CPT® Codes:      Procedure(s):  DECOMPRESSION OF CARPAL TUNNEL LEFT HAND    Surgeon(s):  Sánchez Malone MD    Anesthesia: General with Block    Staff:   Circulator: Carleen Mccoy RN  Scrub Person: Daya Bolanos    Estimated Blood Loss: minimal    Urine Voided: * No values recorded between 9/6/2019 12:55 PM and 9/6/2019  1:45 PM *    Specimens:                none          Drains:      Findings: as above    Complications: none      Sánchez Malone MD     Date: 9/6/2019  Time: 1:45 PM

## 2019-09-06 NOTE — ANESTHESIA POSTPROCEDURE EVALUATION
Patient: Eric ADAMSON Noe    Procedure Summary     Date:  09/06/19 Room / [facility]:   [facility]    Anesthesia Start:  1301 Anesthesia Stop:  1352    Procedure:  DECOMPRESSION OF CARPAL TUNNEL LEFT HAND (Left Hand) Diagnosis:      Surgeon:  Sánchez Maolne MD Provider:  Kirstie Osman MD    Anesthesia Type:  regional ASA Status:  3          Anesthesia Type: regional  Last vitals  BP   144/79 (09/06/19 1405)   Temp   36.5 °C (97.7 °F) (09/06/19 1130)   Pulse   58 (09/06/19 1405)   Resp   16 (09/06/19 1405)     SpO2   94 % (09/06/19 1405)     Anesthesia Post Evaluation

## 2019-09-06 NOTE — ANESTHESIA PREPROCEDURE EVALUATION
Anesthesia Evaluation     Patient summary reviewed and Nursing notes reviewed   no history of anesthetic complications:  NPO Solid Status: > 8 hours  NPO Liquid Status: > 2 hours           Airway   Mallampati: II  TM distance: >3 FB  Neck ROM: full  No difficulty expected  Dental    (+) edentulous    Pulmonary - normal exam   Cardiovascular - normal exam  Exercise tolerance: good (4-7 METS)    ECG reviewed    (+) hypertension, past MI , CAD, CABG, hyperlipidemia,   (-) angina, GRACE      Neuro/Psych  GI/Hepatic/Renal/Endo    (+)   diabetes mellitus type 2, hypothyroidism,     Musculoskeletal     Abdominal    Substance History      OB/GYN          Other                        Anesthesia Plan    ASA 3     regional   (Wrist block + propofol)  intravenous induction   Anesthetic plan, all risks, benefits, and alternatives have been provided, discussed and informed consent has been obtained with: patient.

## 2019-11-27 RX ORDER — HUMAN INSULIN 100 [USP'U]/ML
INJECTION, SUSPENSION SUBCUTANEOUS
Qty: 70 ML | Refills: 11 | Status: SHIPPED | OUTPATIENT
Start: 2019-11-27 | End: 2021-01-18

## 2020-01-09 DIAGNOSIS — Z79.4 CONTROLLED TYPE 2 DIABETES MELLITUS WITHOUT COMPLICATION, WITH LONG-TERM CURRENT USE OF INSULIN (HCC): ICD-10-CM

## 2020-01-09 DIAGNOSIS — E11.9 CONTROLLED TYPE 2 DIABETES MELLITUS WITHOUT COMPLICATION, WITH LONG-TERM CURRENT USE OF INSULIN (HCC): ICD-10-CM

## 2020-01-10 DIAGNOSIS — Z79.4 CONTROLLED TYPE 2 DIABETES MELLITUS WITHOUT COMPLICATION, WITH LONG-TERM CURRENT USE OF INSULIN (HCC): ICD-10-CM

## 2020-01-10 DIAGNOSIS — E11.9 CONTROLLED TYPE 2 DIABETES MELLITUS WITHOUT COMPLICATION, WITH LONG-TERM CURRENT USE OF INSULIN (HCC): ICD-10-CM

## 2020-01-13 DIAGNOSIS — E03.9 ACQUIRED HYPOTHYROIDISM: ICD-10-CM

## 2020-01-14 RX ORDER — LEVOTHYROXINE SODIUM 112 UG/1
TABLET ORAL
Qty: 90 TABLET | Refills: 3 | Status: SHIPPED | OUTPATIENT
Start: 2020-01-14 | End: 2020-12-31

## 2020-01-24 ENCOUNTER — OFFICE VISIT (OUTPATIENT)
Dept: FAMILY MEDICINE CLINIC | Facility: CLINIC | Age: 70
End: 2020-01-24

## 2020-01-24 VITALS
HEIGHT: 71 IN | WEIGHT: 222 LBS | BODY MASS INDEX: 31.08 KG/M2 | SYSTOLIC BLOOD PRESSURE: 150 MMHG | DIASTOLIC BLOOD PRESSURE: 82 MMHG | OXYGEN SATURATION: 98 % | TEMPERATURE: 98.3 F | RESPIRATION RATE: 16 BRPM | HEART RATE: 62 BPM

## 2020-01-24 DIAGNOSIS — I25.10 CORONARY ARTERIOSCLEROSIS IN NATIVE ARTERY: Primary | ICD-10-CM

## 2020-01-24 DIAGNOSIS — Z00.00 MEDICARE ANNUAL WELLNESS VISIT, INITIAL: ICD-10-CM

## 2020-01-24 DIAGNOSIS — E11.9 CONTROLLED TYPE 2 DIABETES MELLITUS WITHOUT COMPLICATION, WITH LONG-TERM CURRENT USE OF INSULIN (HCC): ICD-10-CM

## 2020-01-24 DIAGNOSIS — E78.5 HYPERLIPIDEMIA, UNSPECIFIED HYPERLIPIDEMIA TYPE: ICD-10-CM

## 2020-01-24 DIAGNOSIS — I10 ESSENTIAL HYPERTENSION: ICD-10-CM

## 2020-01-24 DIAGNOSIS — Z79.899 HIGH RISK MEDICATION USE: ICD-10-CM

## 2020-01-24 DIAGNOSIS — Z86.010 HISTORY OF COLON POLYPS: ICD-10-CM

## 2020-01-24 DIAGNOSIS — Z79.4 CONTROLLED TYPE 2 DIABETES MELLITUS WITHOUT COMPLICATION, WITH LONG-TERM CURRENT USE OF INSULIN (HCC): ICD-10-CM

## 2020-01-24 DIAGNOSIS — E03.9 ACQUIRED HYPOTHYROIDISM: ICD-10-CM

## 2020-01-24 DIAGNOSIS — I25.810 CORONARY ARTERY DISEASE INVOLVING CORONARY BYPASS GRAFT OF NATIVE HEART WITHOUT ANGINA PECTORIS: ICD-10-CM

## 2020-01-24 PROCEDURE — G0439 PPPS, SUBSEQ VISIT: HCPCS | Performed by: FAMILY MEDICINE

## 2020-01-24 PROCEDURE — 96160 PT-FOCUSED HLTH RISK ASSMT: CPT | Performed by: FAMILY MEDICINE

## 2020-01-24 NOTE — PROGRESS NOTES
The ABCs of the Annual Wellness Visit  Initial Medicare Wellness Visit    Chief Complaint   Patient presents with   • Annual Exam     AWV       Subjective   History of Present Illness:  Eric Liu is a 69 y.o. male who presents for an Initial Medicare Wellness Visit.    HEALTH RISK ASSESSMENT    Recent Hospitalizations:  No hospitalization(s) within the last year.    Current Medical Providers:  Patient Care Team:  Ralph Chadwick MD as PCP - General  Ralph Chadwick MD as PCP - Family Medicine  Ralph Chadwick MD as PCP - Claims Attributed  Sharri Garcia MD as Consulting Physician (Cardiology)  Dc Mcgee MD as Consulting Physician (Urology)  Sánchez Malone MD as Consulting Physician (Plastic Surgery)  Tico Haynes MD as Surgeon (Neurosurgery)    Smoking Status:  Social History     Tobacco Use   Smoking Status Former Smoker   • Years: 20.00   • Last attempt to quit: 3/8/1986   • Years since quittin.9   Smokeless Tobacco Never Used   Tobacco Comment    Daily caffeine use       Alcohol Consumption:  Social History     Substance and Sexual Activity   Alcohol Use Yes    Comment: occasionally       Depression Screen:   PHQ-2/PHQ-9 Depression Screening 2020   Little interest or pleasure in doing things 0   Feeling down, depressed, or hopeless 0   Trouble falling or staying asleep, or sleeping too much 0   Feeling tired or having little energy 0   Poor appetite or overeating 0   Feeling bad about yourself - or that you are a failure or have let yourself or your family down 0   Trouble concentrating on things, such as reading the newspaper or watching television 0   Moving or speaking so slowly that other people could have noticed. Or the opposite - being so fidgety or restless that you have been moving around a lot more than usual 0   Thoughts that you would be better off dead, or of hurting yourself in some way 0   Total Score 0   If you checked off any problems, how difficult have  these problems made it for you to do your work, take care of things at home, or get along with other people? Not difficult at all       Fall Risk Screen:  MARIANNA Fall Risk Assessment was completed, and patient is at LOW risk for falls.Assessment completed on:1/24/2020    Health Habits and Functional and Cognitive Screening:  Functional & Cognitive Status 1/24/2020   Do you have difficulty preparing food and eating? No   Do you have difficulty bathing yourself, getting dressed or grooming yourself? No   Do you have difficulty using the toilet? No   Do you have difficulty moving around from place to place? No   Do you have trouble with steps or getting out of a bed or a chair? No   Current Diet Well Balanced Diet   Dental Exam Up to date   Eye Exam Up to date   Exercise (times per week) 4 times per week   Current Exercise Activities Include Walking   Do you need help using the phone?  No   Are you deaf or do you have serious difficulty hearing?  No   Do you need help with transportation? No   Do you need help shopping? No   Do you need help preparing meals?  No   Do you need help with housework?  No   Do you need help with laundry? No   Do you need help taking your medications? No   Do you need help managing money? No   Do you ever drive or ride in a car without wearing a seat belt? No   Have you felt unusual stress, anger or loneliness in the last month? No   Who do you live with? Spouse   If you need help, do you have trouble finding someone available to you? No   Have you been bothered in the last four weeks by sexual problems? No   Do you have difficulty concentrating, remembering or making decisions? No         Does the patient have evidence of cognitive impairment? No    Asprin use counseling:Taking ASA appropriately as indicated    Age-appropriate Screening Schedule:  Refer to the list below for future screening recommendations based on patient's age, sex and/or medical conditions. Orders for these recommended  "tests are listed in the plan section. The patient has been provided with a written plan.    Health Maintenance   Topic Date Due   • ZOSTER VACCINE (1 of 2) 06/18/1999   • DIABETIC FOOT EXAM  10/24/2019   • URINE MICROALBUMIN  10/24/2019   • HEMOGLOBIN A1C  01/24/2020   • LIPID PANEL  04/23/2020   • DIABETIC EYE EXAM  06/28/2020   • COLONOSCOPY  06/06/2028   • TDAP/TD VACCINES (2 - Td) 01/23/2029   • INFLUENZA VACCINE  Completed          The following portions of the patient's history were reviewed and updated as appropriate: allergies, past family history, past medical history, past social history, past surgical history and problem list.    Outpatient Medications Prior to Visit   Medication Sig Dispense Refill   • amLODIPine (NORVASC) 5 MG tablet TAKE 1/2 TABLET EVERY MORNING  AND TAKE 1 TABLET EVERY EVENING 135 tablet 3   • aspirin 325 MG tablet Take 325 mg by mouth Daily. HOLD PER MD INSTR-STOPPED 5/27/2017.     • atorvastatin (LIPITOR) 80 MG tablet TAKE ONE-HALF TABLET BY MOUTH DAILY. 135 tablet 3   • carvedilol (COREG) 25 MG tablet Take 1 tablet by mouth 2 (Two) Times a Day. 180 tablet 3   • Empagliflozin (JARDIANCE) 25 MG tablet Take 12.5 mg by mouth Daily. As directed 90 tablet 3   • Insulin Syringe-Needle U-100 30G X 1/2\" 0.5 ML misc 1 each 2 (Two) Times a Day. 200 each 3   • levothyroxine (SYNTHROID, LEVOTHROID) 112 MCG tablet TAKE 1 TABLET EVERY DAY (NEED MD APPOINTMENT) 90 tablet 3   • lisinopril (PRINIVIL,ZESTRIL) 40 MG tablet TAKE 1 TABLET TWICE DAILY 180 tablet 3   • metFORMIN (GLUCOPHAGE) 1000 MG tablet Take 1 tablet by mouth 2 (Two) Times a Day With Meals. *NEEDS APPT 60 tablet 0   • NOVOLIN 70/30 (70-30) 100 UNIT/ML injection INJECT 40 UNITS UNDER THE SKIN INTO THE APPROPRIATE AREA AS DIRECTED 2 (TWO) TIMES A DAY WITH MEALS. 70 mL 11   • Omega-3 Fatty Acids (FISH OIL) 1000 MG capsule capsule Take 1,000 mg by mouth Daily With Breakfast. HOLD PER MD INSTR-STOP 5/30/2017     • HYDROcodone-acetaminophen " "(NORCO) 5-325 MG per tablet Take 1-2 tablets by mouth Every 4 (Four) Hours As Needed (Pain). 30 tablet 0     No facility-administered medications prior to visit.        Patient Active Problem List   Diagnosis   • Coronary arteriosclerosis in native artery   • Hyperlipidemia   • Hypertension   • Fatigue   • Acquired hypothyroidism   • Lipoma of right upper extremity   • Decreased dorsalis pedis pulse   • Decreased pedal pulses   • Controlled type 2 diabetes mellitus without complication, with long-term current use of insulin (CMS/[facility])   • History of colon polyps   • Coronary artery disease involving coronary bypass graft of native heart without angina pectoris   • Encounter for screening for vascular disease       Advanced Care Planning:  Patient does not have an advance directive - information provided to the patient today    Review of Systems   Constitutional: Negative for fatigue.   Respiratory: Negative for shortness of breath.    Cardiovascular: Negative for chest pain and leg swelling.   Endocrine: Negative for polydipsia, polyphagia and polyuria.   Skin: Negative for pallor.   Neurological: Negative for dizziness, tremors, seizures, speech difficulty, weakness and headaches.   Psychiatric/Behavioral: Negative for confusion. The patient is not nervous/anxious.        Compared to one year ago, the patient feels his physical health is the same.  Compared to one year ago, the patient feels his mental health is the same.    Reviewed chart for potential of high risk medication in the elderly: no  Reviewed chart for potential of harmful drug interactions in the elderly:no    Objective         Vitals:    01/24/20 0927   BP: 150/82   BP Location: Left arm   Patient Position: Sitting   Cuff Size: Adult   Pulse: 62   Resp: 16   Temp: 98.3 °F (36.8 °C)   TempSrc: Oral   SpO2: 98%   Weight: 101 kg (222 lb)   Height: 179.7 cm (70.75\")       Body mass index is 31.18 kg/m².  Discussed the patient's BMI with him. The BMI is " above average; BMI management plan is completed.    Physical Exam   Constitutional: He is oriented to person, place, and time. He appears well-developed and well-nourished. No distress.   HENT:   Head: Normocephalic and atraumatic.   Nose: Nose normal.   Mouth/Throat: Oropharynx is clear and moist.   Eyes: Pupils are equal, round, and reactive to light. Conjunctivae and EOM are normal.   Neck: Normal range of motion. No thyromegaly present.   Cardiovascular: Normal rate, regular rhythm and normal heart sounds.   Pulmonary/Chest: Effort normal and breath sounds normal.   Abdominal: Soft. He exhibits no distension. There is no tenderness.   Musculoskeletal: Normal range of motion. He exhibits no edema or deformity.   Lymphadenopathy:     He has no cervical adenopathy.   Neurological: He is alert and oriented to person, place, and time. He exhibits normal muscle tone. Coordination normal.   Skin: Skin is warm and dry.   Psychiatric: He has a normal mood and affect. His behavior is normal. Judgment and thought content normal.   Vitals reviewed.            Assessment/Plan   Medicare Risks and Personalized Health Plan  CMS Preventative Services Quick Reference  Advance Directive Discussion  Obesity/Overweight     The above risks/problems have been discussed with the patient.  Pertinent information has been shared with the patient in the After Visit Summary.  Follow up plans and orders are seen below in the Assessment/Plan Section.    Diagnoses and all orders for this visit:    1. Coronary arteriosclerosis in native artery (Primary)    2. Coronary artery disease involving coronary bypass graft of native heart without angina pectoris    3. Essential hypertension    4. Hyperlipidemia, unspecified hyperlipidemia type  -     Lipid Panel    5. Acquired hypothyroidism  -     TSH+Free T4    6. Controlled type 2 diabetes mellitus without complication, with long-term current use of insulin (CMS/[facility])  -     Comprehensive Metabolic  Panel  -     Hemoglobin A1c  -     Microalbumin / Creatinine Urine Ratio - Urine, Clean Catch    7. History of colon polyps    8. High risk medication use  -     CBC & Differential  -     Comprehensive Metabolic Panel    9. Medicare annual wellness visit, initial      Follow Up:  Patient here for Medicare wellness evaluation as discussed.  Routine follow-up also of above-noted medical problems.  Also followed by cardiologist.  Did have outpatient surgery recently.  Most recent lab work showed A1c less than 7 and patient remains on Jardiance though has been having some difficulty with cost etc.  Discussed losing weight and admits to some diet issues over the holidays.  Hopefully will improve.  Overall doing well on current regimen.  Concerned about blood pressure increase, this also should improve with diet and exercise.  Immunization updates reviewed as well as other health maintenance issues.  Await lab tests and determine if adjustments necessary to medication.  Follow-up in 3 to 6 months depending on results of above lab.    This note includes information entered using a voice recognition dictation system.  Though reviewed, some nonsensible errors may remain.      An After Visit Summary and PPPS were given to the patient.           Answers for HPI/ROS submitted by the patient on 1/22/2020   Diabetes problem  Diabetes type: type 2  MedicAlert ID: No  Disease duration: 25 years  blurred vision: No  foot paresthesias: No  foot ulcerations: No  visual change: No  weight loss: No  Symptom course: stable  hunger: No  mood changes: No  sleepiness: No  sweats: No  blackouts: No  hospitalization: No  required assistance: No  required glucagon: No  CVA: No  heart disease: No  nephropathy: No  peripheral neuropathy: No  retinopathy: No  CAD risks: no known risk factors  Current treatments: diet, insulin injections, oral agent (dual therapy)  Treatment compliance: all of the time  Dose schedule: pre-breakfast,  pre-dinner  Given by: patient  Injection sites: abdominal wall, thighs  Home blood tests: 1-2 x per week  Home urines: <1 x per month  Monitoring compliance: no compliance  Blood glucose trend: no change  breakfast time: 9-10 am  breakfast glucose level: 110-130  lunch time: 1-2 pm  lunch glucose level: 130-140  dinner time: 6-7 pm  dinner glucose level: 110-130  High score: 130-140  Overall: 110-130  Weight trend: stable  Current diet: generally healthy  Meal planning: none  Exercise: every other day  Dietitian visit: No  Eye exam current: Yes  Sees podiatrist: No

## 2020-01-25 LAB
ALBUMIN SERPL-MCNC: 4.4 G/DL (ref 3.5–5.2)
ALBUMIN/CREAT UR: 680 MG/G CREAT (ref 0–29)
ALBUMIN/GLOB SERPL: 1.9 G/DL
ALP SERPL-CCNC: 76 U/L (ref 39–117)
ALT SERPL-CCNC: 18 U/L (ref 1–41)
AST SERPL-CCNC: 25 U/L (ref 1–40)
BASOPHILS # BLD AUTO: 0.07 10*3/MM3 (ref 0–0.2)
BASOPHILS NFR BLD AUTO: 0.7 % (ref 0–1.5)
BILIRUB SERPL-MCNC: 0.4 MG/DL (ref 0.2–1.2)
BUN SERPL-MCNC: 12 MG/DL (ref 8–23)
BUN/CREAT SERPL: 10.3 (ref 7–25)
CALCIUM SERPL-MCNC: 8.9 MG/DL (ref 8.6–10.5)
CHLORIDE SERPL-SCNC: 101 MMOL/L (ref 98–107)
CHOLEST SERPL-MCNC: 132 MG/DL (ref 0–200)
CO2 SERPL-SCNC: 27.6 MMOL/L (ref 22–29)
CREAT SERPL-MCNC: 1.17 MG/DL (ref 0.76–1.27)
CREAT UR-MCNC: 87.5 MG/DL
EOSINOPHIL # BLD AUTO: 0.31 10*3/MM3 (ref 0–0.4)
EOSINOPHIL NFR BLD AUTO: 3.2 % (ref 0.3–6.2)
ERYTHROCYTE [DISTWIDTH] IN BLOOD BY AUTOMATED COUNT: 14 % (ref 12.3–15.4)
GLOBULIN SER CALC-MCNC: 2.3 GM/DL
GLUCOSE SERPL-MCNC: 67 MG/DL (ref 65–99)
HBA1C MFR BLD: 7 % (ref 4.8–5.6)
HCT VFR BLD AUTO: 46.2 % (ref 37.5–51)
HDLC SERPL-MCNC: 42 MG/DL (ref 40–60)
HGB BLD-MCNC: 15 G/DL (ref 13–17.7)
IMM GRANULOCYTES # BLD AUTO: 0.06 10*3/MM3 (ref 0–0.05)
IMM GRANULOCYTES NFR BLD AUTO: 0.6 % (ref 0–0.5)
LDLC SERPL CALC-MCNC: 61 MG/DL (ref 0–100)
LYMPHOCYTES # BLD AUTO: 2.14 10*3/MM3 (ref 0.7–3.1)
LYMPHOCYTES NFR BLD AUTO: 22.4 % (ref 19.6–45.3)
MCH RBC QN AUTO: 27.8 PG (ref 26.6–33)
MCHC RBC AUTO-ENTMCNC: 32.5 G/DL (ref 31.5–35.7)
MCV RBC AUTO: 85.6 FL (ref 79–97)
MICROALBUMIN UR-MCNC: 594.9 UG/ML
MONOCYTES # BLD AUTO: 0.87 10*3/MM3 (ref 0.1–0.9)
MONOCYTES NFR BLD AUTO: 9.1 % (ref 5–12)
NEUTROPHILS # BLD AUTO: 6.12 10*3/MM3 (ref 1.7–7)
NEUTROPHILS NFR BLD AUTO: 64 % (ref 42.7–76)
NRBC BLD AUTO-RTO: 0 /100 WBC (ref 0–0.2)
PLATELET # BLD AUTO: 274 10*3/MM3 (ref 140–450)
POTASSIUM SERPL-SCNC: 4 MMOL/L (ref 3.5–5.2)
PROT SERPL-MCNC: 6.7 G/DL (ref 6–8.5)
RBC # BLD AUTO: 5.4 10*6/MM3 (ref 4.14–5.8)
SODIUM SERPL-SCNC: 144 MMOL/L (ref 136–145)
T4 FREE SERPL-MCNC: 1.47 NG/DL (ref 0.93–1.7)
TRIGL SERPL-MCNC: 147 MG/DL (ref 0–150)
TSH SERPL DL<=0.005 MIU/L-ACNC: 4.96 UIU/ML (ref 0.27–4.2)
VLDLC SERPL CALC-MCNC: 29.4 MG/DL
WBC # BLD AUTO: 9.57 10*3/MM3 (ref 3.4–10.8)

## 2020-01-31 RX ORDER — SYRGE-NDL,INS 0.5 ML HALF MARK 30 G X1/2"
SYRINGE, EMPTY DISPOSABLE MISCELLANEOUS
Qty: 200 EACH | Refills: 3 | Status: SHIPPED | OUTPATIENT
Start: 2020-01-31 | End: 2021-02-18

## 2020-02-10 DIAGNOSIS — Z79.4 CONTROLLED TYPE 2 DIABETES MELLITUS WITHOUT COMPLICATION, WITH LONG-TERM CURRENT USE OF INSULIN (HCC): ICD-10-CM

## 2020-02-10 DIAGNOSIS — E11.9 CONTROLLED TYPE 2 DIABETES MELLITUS WITHOUT COMPLICATION, WITH LONG-TERM CURRENT USE OF INSULIN (HCC): ICD-10-CM

## 2020-02-12 DIAGNOSIS — E11.9 CONTROLLED TYPE 2 DIABETES MELLITUS WITHOUT COMPLICATION, WITH LONG-TERM CURRENT USE OF INSULIN (HCC): ICD-10-CM

## 2020-02-12 DIAGNOSIS — Z79.4 CONTROLLED TYPE 2 DIABETES MELLITUS WITHOUT COMPLICATION, WITH LONG-TERM CURRENT USE OF INSULIN (HCC): ICD-10-CM

## 2020-02-12 RX ORDER — AMLODIPINE BESYLATE 5 MG/1
2.5 TABLET ORAL EVERY MORNING
Qty: 135 TABLET | Refills: 3 | Status: SHIPPED | OUTPATIENT
Start: 2020-02-12 | End: 2021-02-01 | Stop reason: SDUPTHER

## 2020-02-17 DIAGNOSIS — E11.9 CONTROLLED TYPE 2 DIABETES MELLITUS WITHOUT COMPLICATION, WITH LONG-TERM CURRENT USE OF INSULIN (HCC): ICD-10-CM

## 2020-02-17 DIAGNOSIS — Z79.4 CONTROLLED TYPE 2 DIABETES MELLITUS WITHOUT COMPLICATION, WITH LONG-TERM CURRENT USE OF INSULIN (HCC): ICD-10-CM

## 2020-02-18 DIAGNOSIS — Z79.4 CONTROLLED TYPE 2 DIABETES MELLITUS WITHOUT COMPLICATION, WITH LONG-TERM CURRENT USE OF INSULIN (HCC): ICD-10-CM

## 2020-02-18 DIAGNOSIS — E11.9 CONTROLLED TYPE 2 DIABETES MELLITUS WITHOUT COMPLICATION, WITH LONG-TERM CURRENT USE OF INSULIN (HCC): ICD-10-CM

## 2020-02-18 RX ORDER — CARVEDILOL 25 MG/1
TABLET ORAL
Qty: 180 TABLET | Refills: 1 | Status: SHIPPED | OUTPATIENT
Start: 2020-02-18 | End: 2020-08-13

## 2020-03-31 RX ORDER — ATORVASTATIN CALCIUM 80 MG/1
TABLET, FILM COATED ORAL
Qty: 45 TABLET | Refills: 3 | Status: SHIPPED | OUTPATIENT
Start: 2020-03-31 | End: 2020-04-03 | Stop reason: SDUPTHER

## 2020-04-03 RX ORDER — ATORVASTATIN CALCIUM 80 MG/1
TABLET, FILM COATED ORAL
Qty: 45 TABLET | Refills: 3 | Status: SHIPPED | OUTPATIENT
Start: 2020-04-03 | End: 2021-03-24 | Stop reason: SDUPTHER

## 2020-06-22 ENCOUNTER — OFFICE VISIT (OUTPATIENT)
Dept: CARDIOLOGY | Facility: CLINIC | Age: 70
End: 2020-06-22

## 2020-06-22 VITALS
HEART RATE: 59 BPM | HEIGHT: 70 IN | WEIGHT: 220 LBS | SYSTOLIC BLOOD PRESSURE: 144 MMHG | DIASTOLIC BLOOD PRESSURE: 84 MMHG | BODY MASS INDEX: 31.5 KG/M2

## 2020-06-22 DIAGNOSIS — I10 ESSENTIAL HYPERTENSION: ICD-10-CM

## 2020-06-22 DIAGNOSIS — Z79.4 CONTROLLED TYPE 2 DIABETES MELLITUS WITHOUT COMPLICATION, WITH LONG-TERM CURRENT USE OF INSULIN (HCC): ICD-10-CM

## 2020-06-22 DIAGNOSIS — E78.5 HYPERLIPIDEMIA, UNSPECIFIED HYPERLIPIDEMIA TYPE: ICD-10-CM

## 2020-06-22 DIAGNOSIS — I25.810 CORONARY ARTERY DISEASE INVOLVING CORONARY BYPASS GRAFT OF NATIVE HEART WITHOUT ANGINA PECTORIS: Primary | ICD-10-CM

## 2020-06-22 DIAGNOSIS — E11.9 CONTROLLED TYPE 2 DIABETES MELLITUS WITHOUT COMPLICATION, WITH LONG-TERM CURRENT USE OF INSULIN (HCC): ICD-10-CM

## 2020-06-22 PROCEDURE — 99213 OFFICE O/P EST LOW 20 MIN: CPT | Performed by: INTERNAL MEDICINE

## 2020-06-22 PROCEDURE — 93000 ELECTROCARDIOGRAM COMPLETE: CPT | Performed by: INTERNAL MEDICINE

## 2020-06-22 NOTE — PROGRESS NOTES
Date of Office Visit: 2020  Encounter Provider: Sharri Garcia MD  Place of Service: [facility] CARDIOLOGY  Patient Name: Eric Liu  :1950    Chief complaint  Follow-up of coronary artery disease, carotid artery disease    History of Present Illness  The patient is a pleasant 70-year-old gentleman with diabetes, hypertension,  hyperlipidemia, who has a history of coronary artery bypass grafting in  with prior inferior wall infarction. He has a normal systolic function. He underwent coronary artery bypass grafting with a LIMA graft to the LAD, vein graft to the RV branch, as well as a vein graft to the distal PDA and second obtuse marginal branch, circumflex artery and third obtuse marginal branch. He had postoperative fluid retention that resolved with diuretics. He has had intermittent chest pain since then and has had a couple of stress tests since then. His last stress test in 2013 revealed a small inferior wall infarction with minimal amount of richard-infarct ischemia. His ejection fraction was felt to be possibly reduced at 45%. It was unchanged from his prior study a year earlier. However, he had an echocardiogram that revealed normal left ventricular size and function with hypokinesis of the inferior wall with a 61% ejection fraction. There was grade I diastolic dysfunction, moderate left ventricular hypertrophy, no significant valvular dysfunction. He was treated medically.  2016 he was seen for worsening shortness of breath and fatigue a stress echocardiogram revealed no ischemia at a good workload with no significant valvular dysfunction and with normal systolic function.    Since his last visit he is walking every other day.  He denies any chest pain, shortness of breath, palpitations, syncope near syncope.  A walking tube 0.2 to 2.8 miles every other week.  Blood pressure at home has been much lower than it is today.    Past Medical History:      Diagnosis Date   • Acute bronchitis, unspecified    • Anemia    • Atherosclerotic heart disease of native coronary artery without angina pectoris    • CAD (coronary atherosclerotic disease)    • Chest pain    • Colon polyp    • Colonic mass    • High risk medication use    • History of transfusion    • Hyperlipidemia    • Hyperplasia of prostate without urinary obstruction    • Hypertension    • Hypothyroidism (acquired)    • Mass of hepatic flexure of colon    • Myocardial infarction (CMS/HCC)    • Retinal hemorrhage    • Type 2 diabetes mellitus (CMS/HCC)      Past Surgical History:   Procedure Laterality Date   • CARDIAC CATHETERIZATION Left 01/27/2011    Left heart catheterization, coronary angiogarphy, left ventriculography-Dr. Sánchez Casanova   • CARPAL TUNNEL RELEASE Left 9/6/2019    Procedure: DECOMPRESSION OF CARPAL TUNNEL LEFT HAND;  Surgeon: Sánchez Malone MD;  Location: [facility] OR [facility];  Service: Plastics   • CATARACT EXTRACTION Bilateral    • COLON RESECTION Right 6/1/2017    Procedure: LAPAROSCOPIC RIGHT COLON RESECTION;  Surgeon: Pollo Liao MD;  Location: [facility] MAIN OR;  Service:    • COLONOSCOPY N/A 01/31/2005    Colonic diverticulosis, internal hemorrhoids, and a few small colon polyps; Dr. Mushtaq Booth   • COLONOSCOPY N/A 1/24/2017    Procedure: COLONOSCOPY TO CECUM AND TERMINAL ILEUM WITH COLD BIOPSY AND HOT SNARE POLYPECTOMIES, INJECTED WITH NORMAL SALINE 3ML, INJECTED WITH SPOT INK 5ML, AND BIOPSIES;  Surgeon: Mushtaq Booth MD;  Location: [facility] ENDOSCOPY;  Service:    • COLONOSCOPY N/A 4/25/2017    Procedure: COLONOSCOPY to cecum and terminal ileum with cold polypectomy, and biopsies and tattoo (5cc) to mass at 75cm ;  Surgeon: Mushtaq Booth MD;  Location: [facility] ENDOSCOPY;  Service:    • COLONOSCOPY N/A 6/6/2018    Procedure: COLONOSCOPY TO ANASTOMOSIS AND INTO TERMINAL ILEUM;  Surgeon: Pollo Liao MD;  Location: [facility] ENDOSCOPY;  Service: Gastroenterology   • CORONARY ARTERY  "BYPASS GRAFT N/A 02/08/2011    Transesophageal echo, endoscopic vein harvest, coronary artery bypass graft x5, left internal mammary graft to the LAD, vein graft to RV branch, vein graft ot distal posterior descending artery, second marginal branch of the circumflex, third marginal branch of the circumflex, temporary cardiopulmonary bypass, antegrade and retrograde cold blood cardioplegia, warm reperfusion-Dr. Brandon Bucio   • NOSE SURGERY      Laser Suite Ret Treatment Pan-Ablated Inferior Nasal Retina   • SHOULDER MANIPULATION Left 09/01/2004    Manipulation under anesthesia and injection of left shoulder-Dr. INA Ceja   • TONSILLECTOMY N/A      Outpatient Medications Prior to Visit   Medication Sig Dispense Refill   • amLODIPine (NORVASC) 5 MG tablet Take 0.5 tablets by mouth Every Morning. 1 tablet by mouth every evening. 135 tablet 3   • aspirin 325 MG tablet Take 325 mg by mouth Daily. HOLD PER MD INSTR-STOPPED 5/27/2017.     • atorvastatin (LIPITOR) 80 MG tablet TAKE 1/2 TABLET EVERY DAY 45 tablet 3   • carvedilol (COREG) 25 MG tablet TAKE 1 TABLET TWICE DAILY 180 tablet 1   • DROPLET INSULIN SYRINGE 30G X 1/2\" 0.5 ML misc USE TWICE DAILY 200 each 3   • Empagliflozin (JARDIANCE) 25 MG tablet Take 12.5 mg by mouth Daily. As directed 90 tablet 3   • levothyroxine (SYNTHROID, LEVOTHROID) 112 MCG tablet TAKE 1 TABLET EVERY DAY (NEED MD APPOINTMENT) 90 tablet 3   • lisinopril (PRINIVIL,ZESTRIL) 40 MG tablet TAKE 1 TABLET TWICE DAILY 180 tablet 3   • metFORMIN (GLUCOPHAGE) 1000 MG tablet Take 1 tablet by mouth 2 (Two) Times a Day With Meals. 180 tablet 3   • NOVOLIN 70/30 (70-30) 100 UNIT/ML injection INJECT 40 UNITS UNDER THE SKIN INTO THE APPROPRIATE AREA AS DIRECTED 2 (TWO) TIMES A DAY WITH MEALS. 70 mL 11   • Omega-3 Fatty Acids (FISH OIL) 1000 MG capsule capsule Take 1,000 mg by mouth Daily With Breakfast. HOLD PER MD INSTR-STOP 5/30/2017       No facility-administered medications prior to visit.     " "      Allergies as of 2020   • (No Known Allergies)     Social History     Socioeconomic History   • Marital status:      Spouse name: Not on file   • Number of children: Not on file   • Years of education: Not on file   • Highest education level: Not on file   Tobacco Use   • Smoking status: Former Smoker     Years: 20.00     Last attempt to quit: 3/8/1986     Years since quittin.3   • Smokeless tobacco: Never Used   • Tobacco comment: Daily caffeine use   Substance and Sexual Activity   • Alcohol use: Yes     Comment: occasionally   • Drug use: No   • Sexual activity: Defer     Family History   Problem Relation Age of Onset   • Heart attack Father    • Heart disease Father    • Hypertension Other    • Malig Hyperthermia Neg Hx      Review of Systems   Constitution: Negative for fever, malaise/fatigue, weight gain and weight loss.   HENT: Negative for ear pain, hearing loss, nosebleeds and sore throat.    Eyes: Negative for double vision, pain, vision loss in left eye and vision loss in right eye.   Cardiovascular:        See history of present illness.   Respiratory: Negative for cough, shortness of breath, sleep disturbances due to breathing, snoring and wheezing.    Endocrine: Negative for cold intolerance, heat intolerance and polyuria.   Skin: Negative for itching, poor wound healing and rash.   Musculoskeletal: Negative for joint pain, joint swelling and myalgias.   Gastrointestinal: Negative for abdominal pain, diarrhea, hematochezia, nausea and vomiting.   Genitourinary: Negative for hematuria and hesitancy.   Neurological: Negative for numbness, paresthesias and seizures.   Psychiatric/Behavioral: Negative for depression. The patient is not nervous/anxious.         Objective:     Vitals:    20 0945   BP: 144/84   Pulse: 59   Weight: 99.8 kg (220 lb)   Height: 177.8 cm (70\")     Body mass index is 31.57 kg/m².    Physical Exam   Constitutional: He is oriented to person, place, and " time. He appears well-developed and well-nourished.   Obese   HENT:   Head: Normocephalic.   Nose: Nose normal.   Mouth/Throat: Oropharynx is clear and moist.   Eyes: Pupils are equal, round, and reactive to light. Conjunctivae and EOM are normal. Right eye exhibits no discharge. No scleral icterus.   Neck: Normal range of motion. Neck supple. No JVD present. No thyromegaly present.   Cardiovascular: Normal rate, regular rhythm, normal heart sounds and intact distal pulses. Exam reveals no gallop and no friction rub.   No murmur heard.  Pulses:       Carotid pulses are 2+ on the right side, and 2+ on the left side.       Radial pulses are 2+ on the right side, and 2+ on the left side.        Femoral pulses are 2+ on the right side, and 2+ on the left side.       Popliteal pulses are 2+ on the right side, and 2+ on the left side.        Dorsalis pedis pulses are 2+ on the right side, and 2+ on the left side.        Posterior tibial pulses are 2+ on the right side, and 2+ on the left side.   Pulmonary/Chest: Effort normal and breath sounds normal. No respiratory distress. He has no wheezes. He has no rales.   Abdominal: Soft. Bowel sounds are normal. He exhibits no distension. There is no hepatosplenomegaly. There is no tenderness. There is no rebound.   Musculoskeletal: Normal range of motion. He exhibits no edema or tenderness.   Neurological: He is alert and oriented to person, place, and time.   Skin: Skin is warm and dry. No rash noted. No erythema.   Psychiatric: He has a normal mood and affect. His behavior is normal. Judgment and thought content normal.   Vitals reviewed.    Lab Review:     ECG 12 Lead  Date/Time: 6/22/2020 10:28 AM  Performed by: Sharri Garcia MD  Authorized by: Sharri Garcia MD   Comparison: compared with previous ECG   Similar to previous ECG  Rhythm: sinus rhythm  Conduction: right bundle branch block  Other findings comments: Consider prior inferior wall infarction    Clinical impression:  "abnormal EKG          Assessment:       Diagnosis Plan   1. Coronary artery disease involving coronary bypass graft of native heart without angina pectoris  ECG 12 Lead   2. Essential hypertension     3. Hyperlipidemia, unspecified hyperlipidemia type     4. Controlled type 2 diabetes mellitus without complication, with long-term current use of insulin (CMS/[facility])       Plan:         1.  Coronary artery disease with history of prior myocardial infarction and CABG 2011.  With a negative stress test in 2016.  He wishes to defer stress testing with COVID restriction still only recently lifted for several months.  We will have him return in November and see UCHE Ryan at that time will consider treadmill exercise stress test.  2.  Carotid artery disease, negative carotid Doppler and March 2016.  Negative vascular study 2019  3.  Hypertension.  Much lower at home.  Will continue current regimen  4.  Hyperlipidemia.  Within reasonable limits in January.  5.  Diabetes  6.  Hypothyroidism         Your medication list           Accurate as of June 22, 2020 11:59 PM. If you have any questions, ask your nurse or doctor.               CONTINUE taking these medications      Instructions Last Dose Given Next Dose Due   amLODIPine 5 MG tablet  Commonly known as:  NORVASC      Take 0.5 tablets by mouth Every Morning. 1 tablet by mouth every evening.       aspirin 325 MG tablet      Take 325 mg by mouth Daily. HOLD PER MD CISSE-STOPPED 5/27/2017.       atorvastatin 80 MG tablet  Commonly known as:  LIPITOR      TAKE 1/2 TABLET EVERY DAY       carvedilol 25 MG tablet  Commonly known as:  COREG      TAKE 1 TABLET TWICE DAILY       Droplet Insulin Syringe 30G X 1/2\" 0.5 ML misc  Generic drug:  Insulin Syringe-Needle U-100      USE TWICE DAILY       Empagliflozin 25 MG tablet  Commonly known as:  Jardiance      Take 12.5 mg by mouth Daily. As directed       fish oil 1000 MG capsule capsule      Take 1,000 mg by mouth Daily With " Breakfast. HOLD PER MD INSTR-STOP 5/30/2017       levothyroxine 112 MCG tablet  Commonly known as:  SYNTHROID, LEVOTHROID      TAKE 1 TABLET EVERY DAY (NEED MD APPOINTMENT)       lisinopril 40 MG tablet  Commonly known as:  PRINIVIL,ZESTRIL      TAKE 1 TABLET TWICE DAILY       metFORMIN 1000 MG tablet  Commonly known as:  GLUCOPHAGE      Take 1 tablet by mouth 2 (Two) Times a Day With Meals.       NovoLIN 70/30 (70-30) 100 UNIT/ML injection  Generic drug:  insulin NPH-insulin regular      INJECT 40 UNITS UNDER THE SKIN INTO THE APPROPRIATE AREA AS DIRECTED 2 (TWO) TIMES A DAY WITH MEALS.              Patient is no longer taking -.  I corrected the med list to reflect this.  I did not stop these medications.    Dictated utilizing Dragon dictation

## 2020-07-06 ENCOUNTER — TELEPHONE (OUTPATIENT)
Dept: FAMILY MEDICINE CLINIC | Facility: CLINIC | Age: 70
End: 2020-07-06

## 2020-07-06 NOTE — TELEPHONE ENCOUNTER
Left phone message for patient informing no generic for Jardiance.  Sometimes better insurance coverage for similar medication but would depend on insurance, part D coverage?  Told to return call to discuss

## 2020-07-06 NOTE — TELEPHONE ENCOUNTER
PATIENT IS CALLING TO SEE IF THERE WAS A GENERIC ON HIS MEDICATION   Empagliflozin (JARDIANCE) 25 MG tablet WHEN THE MEDICATION IS DUE TO BE REFILL THE CO-PAY WILL BE $411.00    PATIENT SAID THIS DRUG IS HELPING BUT NEEDS TO SEE IF THERE IS A GENERIC    PLEASE CONTACT PT @[phone]

## 2020-07-13 DIAGNOSIS — I25.10 CORONARY ARTERIOSCLEROSIS IN NATIVE ARTERY: ICD-10-CM

## 2020-07-13 DIAGNOSIS — E11.9 CONTROLLED TYPE 2 DIABETES MELLITUS WITHOUT COMPLICATION, WITH LONG-TERM CURRENT USE OF INSULIN (HCC): ICD-10-CM

## 2020-07-13 DIAGNOSIS — Z79.4 CONTROLLED TYPE 2 DIABETES MELLITUS WITHOUT COMPLICATION, WITH LONG-TERM CURRENT USE OF INSULIN (HCC): ICD-10-CM

## 2020-07-14 RX ORDER — EMPAGLIFLOZIN 25 MG/1
TABLET, FILM COATED ORAL
Qty: 90 TABLET | Refills: 3 | Status: SHIPPED | OUTPATIENT
Start: 2020-07-14 | End: 2021-08-23

## 2020-07-24 ENCOUNTER — OFFICE VISIT (OUTPATIENT)
Dept: FAMILY MEDICINE CLINIC | Facility: CLINIC | Age: 70
End: 2020-07-24

## 2020-07-24 VITALS
DIASTOLIC BLOOD PRESSURE: 72 MMHG | OXYGEN SATURATION: 97 % | HEART RATE: 62 BPM | WEIGHT: 218 LBS | SYSTOLIC BLOOD PRESSURE: 130 MMHG | BODY MASS INDEX: 31.21 KG/M2 | TEMPERATURE: 98.5 F | HEIGHT: 70 IN | RESPIRATION RATE: 20 BRPM

## 2020-07-24 DIAGNOSIS — Z79.899 HIGH RISK MEDICATION USE: ICD-10-CM

## 2020-07-24 DIAGNOSIS — I10 ESSENTIAL HYPERTENSION: Primary | ICD-10-CM

## 2020-07-24 DIAGNOSIS — E78.5 HYPERLIPIDEMIA, UNSPECIFIED HYPERLIPIDEMIA TYPE: ICD-10-CM

## 2020-07-24 DIAGNOSIS — I25.810 CORONARY ARTERY DISEASE INVOLVING CORONARY BYPASS GRAFT OF NATIVE HEART WITHOUT ANGINA PECTORIS: ICD-10-CM

## 2020-07-24 DIAGNOSIS — E03.9 ACQUIRED HYPOTHYROIDISM: ICD-10-CM

## 2020-07-24 DIAGNOSIS — E11.9 CONTROLLED TYPE 2 DIABETES MELLITUS WITHOUT COMPLICATION, WITH LONG-TERM CURRENT USE OF INSULIN (HCC): ICD-10-CM

## 2020-07-24 DIAGNOSIS — Z79.4 CONTROLLED TYPE 2 DIABETES MELLITUS WITHOUT COMPLICATION, WITH LONG-TERM CURRENT USE OF INSULIN (HCC): ICD-10-CM

## 2020-07-24 PROCEDURE — 99214 OFFICE O/P EST MOD 30 MIN: CPT | Performed by: FAMILY MEDICINE

## 2020-07-24 NOTE — PROGRESS NOTES
HPI  Eric Liu is a 70 y.o. male who is here for follow up of multiple ongoing medical issues including diabetes hypertension and known coronary artery disease.  Reports does have follow-up with cardiologist in a couple of months.  Also recently seen by urologist and told PSA level okay as well as prostate exam.  Updated immunizations and reports did get shingles vaccines several months ago.  Seen by ophthalmologist every 6 months.      Review of Systems   Respiratory: Negative for shortness of breath.    Cardiovascular: Negative for chest pain and leg swelling.   All other systems reviewed and are negative.        Past Medical History:   Diagnosis Date   • Acute bronchitis, unspecified    • Anemia    • Atherosclerotic heart disease of native coronary artery without angina pectoris    • CAD (coronary atherosclerotic disease)    • Chest pain    • Colon polyp    • Colonic mass    • High risk medication use    • History of transfusion    • Hyperlipidemia    • Hyperplasia of prostate without urinary obstruction    • Hypertension    • Hypothyroidism (acquired)    • Mass of hepatic flexure of colon    • Myocardial infarction (CMS/HCC)    • Retinal hemorrhage    • Type 2 diabetes mellitus (CMS/HCC)        Past Surgical History:   Procedure Laterality Date   • CARDIAC CATHETERIZATION Left 01/27/2011    Left heart catheterization, coronary angiogarphy, left ventriculography-Dr. Pozo    • CARPAL TUNNEL RELEASE Left 9/6/2019    Procedure: DECOMPRESSION OF CARPAL TUNNEL LEFT HAND;  Surgeon: Sánchez Malone MD;  Location: [facility];  Service: Plastics   • CATARACT EXTRACTION Bilateral    • COLON RESECTION Right 6/1/2017    Procedure: LAPAROSCOPIC RIGHT COLON RESECTION;  Surgeon: Pollo Liao MD;  Location: [facility];  Service:    • COLONOSCOPY N/A 01/31/2005    Colonic diverticulosis, internal hemorrhoids, and a few small colon polyps; Dr. Mushtaq Booth   • COLONOSCOPY N/A 1/24/2017    Procedure:  COLONOSCOPY TO CECUM AND TERMINAL ILEUM WITH COLD BIOPSY AND HOT SNARE POLYPECTOMIES, INJECTED WITH NORMAL SALINE 3ML, INJECTED WITH SPOT INK 5ML, AND BIOPSIES;  Surgeon: Mushtaq Booth MD;  Location: [facility] ENDOSCOPY;  Service:    • COLONOSCOPY N/A 2017    Procedure: COLONOSCOPY to cecum and terminal ileum with cold polypectomy, and biopsies and tattoo (5cc) to mass at 75cm ;  Surgeon: Mushtaq Booth MD;  Location: [facility] ENDOSCOPY;  Service:    • COLONOSCOPY N/A 2018    Procedure: COLONOSCOPY TO ANASTOMOSIS AND INTO TERMINAL ILEUM;  Surgeon: Pollo Liao MD;  Location: [facility] ENDOSCOPY;  Service: Gastroenterology   • CORONARY ARTERY BYPASS GRAFT N/A 2011    Transesophageal echo, endoscopic vein harvest, coronary artery bypass graft x5, left internal mammary graft to the LAD, vein graft to RV branch, vein graft ot distal posterior descending artery, second marginal branch of the circumflex, third marginal branch of the circumflex, temporary cardiopulmonary bypass, antegrade and retrograde cold blood cardioplegia, warm reperfusion-Dr. Brandon Bucio   • NOSE SURGERY      Laser Suite Ret Treatment Pan-Ablated Inferior Nasal Retina   • SHOULDER MANIPULATION Left 2004    Manipulation under anesthesia and injection of left shoulder-Dr. INA Ceja   • TONSILLECTOMY N/A        Family History   Problem Relation Age of Onset   • Heart attack Father    • Heart disease Father    • Hypertension Other    • Malig Hyperthermia Neg Hx        Social History     Socioeconomic History   • Marital status:      Spouse name: Not on file   • Number of children: Not on file   • Years of education: Not on file   • Highest education level: Not on file   Tobacco Use   • Smoking status: Former Smoker     Years: 20.00     Last attempt to quit: 3/8/1986     Years since quittin.4   • Smokeless tobacco: Never Used   • Tobacco comment: Daily caffeine use   Substance and Sexual Activity   • Alcohol use:  Yes     Comment: occasionally   • Drug use: No   • Sexual activity: Defer         Physical Exam   Constitutional: He is oriented to person, place, and time. He appears well-developed and well-nourished. No distress.   HENT:   Head: Normocephalic and atraumatic.   Eyes: Pupils are equal, round, and reactive to light. Conjunctivae and EOM are normal.   Neck: Normal range of motion.   Cardiovascular: Normal rate and regular rhythm.   Pulmonary/Chest: Effort normal. No respiratory distress.   Musculoskeletal: Normal range of motion. He exhibits no edema or deformity.   Lymphadenopathy:     He has no cervical adenopathy.   Neurological: He is alert and oriented to person, place, and time. Coordination normal.   Skin: Skin is warm and dry.   Psychiatric: He has a normal mood and affect. His behavior is normal. Judgment and thought content normal.   Nursing note and vitals reviewed.        Assessment/Plan    Eric was seen today for diabetes.    Diagnoses and all orders for this visit:    Essential hypertension  -     Comprehensive Metabolic Panel    Hyperlipidemia, unspecified hyperlipidemia type  -     Lipid Panel    Controlled type 2 diabetes mellitus without complication, with long-term current use of insulin (CMS/[facility])  -     Hemoglobin A1c    Acquired hypothyroidism  -     TSH+Free T4    Coronary artery disease involving coronary bypass graft of native heart without angina pectoris    High risk medication use  -     CBC & Differential  -     Comprehensive Metabolic Panel      Patient here for routine follow-up of above-noted medical problems.  Overall seems to be doing extremely well on current regimen which will be continued including follow-up every 6 months.  Will be due for annual Medicare wellness evaluation after January 21, 2021.    This note includes information entered using a voice recognition dictation system.  Though reviewed, some nonsensible errors may remain.

## 2020-07-25 LAB
ALBUMIN SERPL-MCNC: 4.2 G/DL (ref 3.5–5.2)
ALBUMIN/GLOB SERPL: 1.6 G/DL
ALP SERPL-CCNC: 73 U/L (ref 39–117)
ALT SERPL-CCNC: 18 U/L (ref 1–41)
AST SERPL-CCNC: 21 U/L (ref 1–40)
BASOPHILS # BLD AUTO: 0.07 10*3/MM3 (ref 0–0.2)
BASOPHILS NFR BLD AUTO: 0.7 % (ref 0–1.5)
BILIRUB SERPL-MCNC: 0.4 MG/DL (ref 0–1.2)
BUN SERPL-MCNC: 17 MG/DL (ref 8–23)
BUN/CREAT SERPL: 15 (ref 7–25)
CALCIUM SERPL-MCNC: 9 MG/DL (ref 8.6–10.5)
CHLORIDE SERPL-SCNC: 102 MMOL/L (ref 98–107)
CHOLEST SERPL-MCNC: 133 MG/DL (ref 0–200)
CO2 SERPL-SCNC: 28 MMOL/L (ref 22–29)
CREAT SERPL-MCNC: 1.13 MG/DL (ref 0.76–1.27)
EOSINOPHIL # BLD AUTO: 0.23 10*3/MM3 (ref 0–0.4)
EOSINOPHIL NFR BLD AUTO: 2.2 % (ref 0.3–6.2)
ERYTHROCYTE [DISTWIDTH] IN BLOOD BY AUTOMATED COUNT: 14.5 % (ref 12.3–15.4)
GLOBULIN SER CALC-MCNC: 2.7 GM/DL
GLUCOSE SERPL-MCNC: 155 MG/DL (ref 65–99)
HBA1C MFR BLD: 6.9 % (ref 4.8–5.6)
HCT VFR BLD AUTO: 45.4 % (ref 37.5–51)
HDLC SERPL-MCNC: 42 MG/DL (ref 40–60)
HGB BLD-MCNC: 15 G/DL (ref 13–17.7)
IMM GRANULOCYTES # BLD AUTO: 0.02 10*3/MM3 (ref 0–0.05)
IMM GRANULOCYTES NFR BLD AUTO: 0.2 % (ref 0–0.5)
LDLC SERPL CALC-MCNC: 63 MG/DL (ref 0–100)
LYMPHOCYTES # BLD AUTO: 2.21 10*3/MM3 (ref 0.7–3.1)
LYMPHOCYTES NFR BLD AUTO: 21.3 % (ref 19.6–45.3)
MCH RBC QN AUTO: 28.1 PG (ref 26.6–33)
MCHC RBC AUTO-ENTMCNC: 33 G/DL (ref 31.5–35.7)
MCV RBC AUTO: 85.2 FL (ref 79–97)
MONOCYTES # BLD AUTO: 0.86 10*3/MM3 (ref 0.1–0.9)
MONOCYTES NFR BLD AUTO: 8.3 % (ref 5–12)
NEUTROPHILS # BLD AUTO: 6.99 10*3/MM3 (ref 1.7–7)
NEUTROPHILS NFR BLD AUTO: 67.3 % (ref 42.7–76)
NRBC BLD AUTO-RTO: 0 /100 WBC (ref 0–0.2)
PLATELET # BLD AUTO: 254 10*3/MM3 (ref 140–450)
POTASSIUM SERPL-SCNC: 4.2 MMOL/L (ref 3.5–5.2)
PROT SERPL-MCNC: 6.9 G/DL (ref 6–8.5)
RBC # BLD AUTO: 5.33 10*6/MM3 (ref 4.14–5.8)
SODIUM SERPL-SCNC: 142 MMOL/L (ref 136–145)
T4 FREE SERPL-MCNC: 1.58 NG/DL (ref 0.93–1.7)
TRIGL SERPL-MCNC: 140 MG/DL (ref 0–150)
TSH SERPL DL<=0.005 MIU/L-ACNC: 2.66 UIU/ML (ref 0.27–4.2)
VLDLC SERPL CALC-MCNC: 28 MG/DL
WBC # BLD AUTO: 10.38 10*3/MM3 (ref 3.4–10.8)

## 2020-07-28 ENCOUNTER — TELEPHONE (OUTPATIENT)
Dept: FAMILY MEDICINE CLINIC | Facility: CLINIC | Age: 70
End: 2020-07-28

## 2020-07-31 ENCOUNTER — TELEPHONE (OUTPATIENT)
Dept: FAMILY MEDICINE CLINIC | Facility: CLINIC | Age: 70
End: 2020-07-31

## 2020-08-13 RX ORDER — CARVEDILOL 25 MG/1
TABLET ORAL
Qty: 180 TABLET | Refills: 1 | Status: SHIPPED | OUTPATIENT
Start: 2020-08-13 | End: 2021-02-15 | Stop reason: SDUPTHER

## 2020-08-17 RX ORDER — LISINOPRIL 40 MG/1
TABLET ORAL
Qty: 180 TABLET | Refills: 1 | Status: SHIPPED | OUTPATIENT
Start: 2020-08-17 | End: 2021-03-24

## 2020-12-31 DIAGNOSIS — E03.9 ACQUIRED HYPOTHYROIDISM: ICD-10-CM

## 2020-12-31 RX ORDER — LEVOTHYROXINE SODIUM 112 UG/1
TABLET ORAL
Qty: 90 TABLET | Refills: 3 | Status: SHIPPED | OUTPATIENT
Start: 2020-12-31 | End: 2021-11-29

## 2021-01-07 ENCOUNTER — TELEPHONE (OUTPATIENT)
Dept: FAMILY MEDICINE CLINIC | Facility: CLINIC | Age: 71
End: 2021-01-07

## 2021-01-07 NOTE — TELEPHONE ENCOUNTER
PATIENT STATES IN THE PAST DR. RICHEY HAS GIVEN HIM SAMPLES OF JARDIANCE 10 MG OR 25 MG tablet, BECAUSE IT IS A TIER 3 ON HIS INSURANCE IT IS WAY TO COSTLY FOR HIM TO BUDGET. PATIENT IS INQUIRING IF THERE ARE ANY SAMPLES CURRENTLY AT THE OFFICE THAT HE MAY ? PLEASE CALL PATIENT AND ADVISE HIM RATHER OR NOT WE HAVE ANY SAMPLES FOR HIM.    PATIENT CAN REACHED AT: [phone]

## 2021-01-18 RX ORDER — HUMAN INSULIN 100 [USP'U]/ML
INJECTION, SUSPENSION SUBCUTANEOUS
Qty: 70 ML | Refills: 11 | Status: SHIPPED | OUTPATIENT
Start: 2021-01-18 | End: 2022-01-20

## 2021-01-28 ENCOUNTER — OFFICE VISIT (OUTPATIENT)
Dept: FAMILY MEDICINE CLINIC | Facility: CLINIC | Age: 71
End: 2021-01-28

## 2021-01-28 VITALS
RESPIRATION RATE: 18 BRPM | SYSTOLIC BLOOD PRESSURE: 120 MMHG | BODY MASS INDEX: 30.67 KG/M2 | DIASTOLIC BLOOD PRESSURE: 78 MMHG | OXYGEN SATURATION: 98 % | HEART RATE: 63 BPM | WEIGHT: 214.2 LBS | HEIGHT: 70 IN | TEMPERATURE: 97.8 F

## 2021-01-28 DIAGNOSIS — Z79.4 CONTROLLED TYPE 2 DIABETES MELLITUS WITHOUT COMPLICATION, WITH LONG-TERM CURRENT USE OF INSULIN (HCC): ICD-10-CM

## 2021-01-28 DIAGNOSIS — I25.810 CORONARY ARTERY DISEASE INVOLVING CORONARY BYPASS GRAFT OF NATIVE HEART WITHOUT ANGINA PECTORIS: ICD-10-CM

## 2021-01-28 DIAGNOSIS — E78.5 HYPERLIPIDEMIA, UNSPECIFIED HYPERLIPIDEMIA TYPE: ICD-10-CM

## 2021-01-28 DIAGNOSIS — E03.9 ACQUIRED HYPOTHYROIDISM: ICD-10-CM

## 2021-01-28 DIAGNOSIS — I25.10 CORONARY ARTERIOSCLEROSIS IN NATIVE ARTERY: Primary | ICD-10-CM

## 2021-01-28 DIAGNOSIS — I10 ESSENTIAL HYPERTENSION: ICD-10-CM

## 2021-01-28 DIAGNOSIS — E11.9 CONTROLLED TYPE 2 DIABETES MELLITUS WITHOUT COMPLICATION, WITH LONG-TERM CURRENT USE OF INSULIN (HCC): ICD-10-CM

## 2021-01-28 DIAGNOSIS — Z79.899 HIGH RISK MEDICATION USE: ICD-10-CM

## 2021-01-28 PROCEDURE — 99213 OFFICE O/P EST LOW 20 MIN: CPT | Performed by: FAMILY MEDICINE

## 2021-01-28 NOTE — PROGRESS NOTES
HPI  Eric Liu is a 70 y.o. male who is here for follow up of diabetes known coronary artery disease hypothyroidism and hyperlipidemia.  Patient reports some weight loss and overall doing well with no complaints.  Basically here for routine 6-month lab work and checkup.  Is being seen by eye specialist every 6 months.  Other healthcare maintenance discussed and will schedule for annual Medicare wellness evaluation at next visit.      Review of Systems   Constitutional: Negative for fatigue.   Cardiovascular: Negative for chest pain, palpitations and leg swelling.   Endocrine: Negative for polydipsia, polyphagia and polyuria.   Skin: Negative for pallor.   Neurological: Negative for dizziness, tremors, seizures, speech difficulty, weakness and headaches.   Psychiatric/Behavioral: Negative for confusion. The patient is not nervous/anxious.    All other systems reviewed and are negative.        Past Medical History:   Diagnosis Date   • Acute bronchitis, unspecified    • Anemia    • Atherosclerotic heart disease of native coronary artery without angina pectoris    • CAD (coronary atherosclerotic disease)    • Chest pain    • Colon polyp    • Colonic mass    • High risk medication use    • History of transfusion    • Hyperlipidemia    • Hyperplasia of prostate without urinary obstruction    • Hypertension    • Hypothyroidism (acquired)    • Mass of hepatic flexure of colon    • Myocardial infarction (CMS/HCC)    • Retinal hemorrhage    • Type 2 diabetes mellitus (CMS/HCC)        Past Surgical History:   Procedure Laterality Date   • CARDIAC CATHETERIZATION Left 01/27/2011    Left heart catheterization, coronary angiogarphy, left ventriculography-Dr. Sánchez Casanova   • CARPAL TUNNEL RELEASE Left 9/6/2019    Procedure: DECOMPRESSION OF CARPAL TUNNEL LEFT HAND;  Surgeon: Sánchez Malone MD;  Location: [facility] OR [facility] [place];  Service: Plastics   • CATARACT EXTRACTION Bilateral    • COLON RESECTION Right 6/1/2017    Procedure:  LAPAROSCOPIC RIGHT COLON RESECTION;  Surgeon: Pollo Liao MD;  Location: [facility] MAIN OR;  Service:    • COLONOSCOPY N/A 01/31/2005    Colonic diverticulosis, internal hemorrhoids, and a few small colon polyps; Dr. Mushtaq Booth   • COLONOSCOPY N/A 1/24/2017    Procedure: COLONOSCOPY TO CECUM AND TERMINAL ILEUM WITH COLD BIOPSY AND HOT SNARE POLYPECTOMIES, INJECTED WITH NORMAL SALINE 3ML, INJECTED WITH SPOT INK 5ML, AND BIOPSIES;  Surgeon: Mushtaq Booth MD;  Location: [facility] ENDOSCOPY;  Service:    • COLONOSCOPY N/A 4/25/2017    Procedure: COLONOSCOPY to cecum and terminal ileum with cold polypectomy, and biopsies and tattoo (5cc) to mass at 75cm ;  Surgeon: Mushtaq Booth MD;  Location: [facility] ENDOSCOPY;  Service:    • COLONOSCOPY N/A 6/6/2018    Procedure: COLONOSCOPY TO ANASTOMOSIS AND INTO TERMINAL ILEUM;  Surgeon: Pollo Liao MD;  Location: [facility] ENDOSCOPY;  Service: Gastroenterology   • CORONARY ARTERY BYPASS GRAFT N/A 02/08/2011    Transesophageal echo, endoscopic vein harvest, coronary artery bypass graft x5, left internal mammary graft to the LAD, vein graft to RV branch, vein graft ot distal posterior descending artery, second marginal branch of the circumflex, third marginal branch of the circumflex, temporary cardiopulmonary bypass, antegrade and retrograde cold blood cardioplegia, warm reperfusion-Dr. Brandon Bucio   • NOSE SURGERY      Laser Suite Ret Treatment Pan-Ablated Inferior Nasal Retina   • SHOULDER MANIPULATION Left 09/01/2004    Manipulation under anesthesia and injection of left shoulder-Dr. INA Ceja   • TONSILLECTOMY N/A        Family History   Problem Relation Age of Onset   • Heart attack Father    • Heart disease Father    • Hypertension Other    • Malig Hyperthermia Neg Hx        Social History     Socioeconomic History   • Marital status:      Spouse name: Not on file   • Number of children: Not on file   • Years of education: Not on file   • Highest  education level: Not on file   Tobacco Use   • Smoking status: Former Smoker     Years: 20.00     Quit date: 3/8/1986     Years since quittin.9   • Smokeless tobacco: Never Used   • Tobacco comment: Daily caffeine use   Substance and Sexual Activity   • Alcohol use: Yes     Comment: occasionally   • Drug use: No   • Sexual activity: Defer       Vitals:    21 0808   BP: 120/78   Pulse: 63   Resp: 18   Temp: 97.8 °F (36.6 °C)   SpO2: 98%        Body mass index is 30.73 kg/m².      Physical Exam  Vitals signs and nursing note reviewed.   Constitutional:       General: He is not in acute distress.     Appearance: He is well-developed.   HENT:      Head: Normocephalic and atraumatic.      Nose:      Comments: Patient with mask provider with mask and shield.  Patient is scheduled for initial Covid vaccine next week  Eyes:      Extraocular Movements: Extraocular movements intact.      Conjunctiva/sclera: Conjunctivae normal.      Pupils: Pupils are equal, round, and reactive to light.   Neck:      Musculoskeletal: Normal range of motion.      Thyroid: No thyromegaly.   Cardiovascular:      Rate and Rhythm: Normal rate and regular rhythm.      Heart sounds: Normal heart sounds.   Pulmonary:      Effort: Pulmonary effort is normal. No respiratory distress.      Breath sounds: Normal breath sounds.   Abdominal:      General: There is no distension.      Palpations: Abdomen is soft. There is no mass.      Tenderness: There is no abdominal tenderness.      Hernia: No hernia is present.   Musculoskeletal: Normal range of motion.         General: No tenderness or deformity.   Lymphadenopathy:      Cervical: No cervical adenopathy.   Skin:     General: Skin is warm and dry.      Coloration: Skin is not pale.      Findings: No rash.   Neurological:      General: No focal deficit present.      Mental Status: He is alert and oriented to person, place, and time. Mental status is at baseline.      Motor: No abnormal muscle  tone.      Coordination: Coordination normal.   Psychiatric:         Mood and Affect: Mood normal.         Behavior: Behavior normal.         Thought Content: Thought content normal.         Judgment: Judgment normal.           Assessment/Plan    Diagnoses and all orders for this visit:    1. Coronary arteriosclerosis in native artery (Primary)  -     Lipid Panel    2. Coronary artery disease involving coronary bypass graft of native heart without angina pectoris    3. Hyperlipidemia, unspecified hyperlipidemia type  -     Lipid Panel    4. Essential hypertension  -     Comprehensive Metabolic Panel    5. Acquired hypothyroidism  -     TSH+Free T4    6. Controlled type 2 diabetes mellitus without complication, with long-term current use of insulin (CMS/[facility])  -     Lipid Panel  -     Microalbumin / Creatinine Urine Ratio - Urine, Clean Catch  -     Hemoglobin A1c    7. High risk medication use  -     CBC & Differential  -     Comprehensive Metabolic Panel      Patient here for routine follow-up of above-noted medical problems.  Doing very well on current regimen which will be continued including routine follow-up and Medicare wellness evaluation in 6 months.    This note includes information entered using a voice recognition dictation system.  Though reviewed, some nonsensible errors may remain.        Answers for HPI/ROS submitted by the patient on 1/26/2021   Diabetes problem  What is the primary reason for your visit?: Diabetes  Diabetes type: type 2  MedicAlert ID: No  Disease duration: 30 years  blurred vision: No  foot paresthesias: No  foot ulcerations: No  visual change: No  weight loss: No  Symptom course: stable  hunger: No  mood changes: No  sleepiness: No  sweats: No  blackouts: No  hospitalization: No  nocturnal hypoglycemia: No  required assistance: No  required glucagon: No  CVA: No  heart disease: Yes  impotence: Yes  nephropathy: No  peripheral neuropathy: No  PVD: No  retinopathy: No  CAD risks:  family history, hypertension  Current treatments: insulin injections, oral agent (dual therapy)  Treatment compliance: all of the time  Dose schedule: pre-breakfast, pre-dinner  Given by: patient  Injection sites: abdominal wall, thighs  Home urines: <1 x per month  Monitoring compliance: no compliance  Blood glucose trend: no change  breakfast time: 8-9 am  breakfast glucose level: 110-130  lunch time: 12-1 pm  lunch glucose level: 130-140  dinner time: 6-7 pm  dinner glucose level: 130-140  High score: 140-180  Overall: 110-130  Weight trend: stable  Current diet: generally healthy  Meal planning: none  Exercise: every other day  Dietitian visit: No  Eye exam current: Yes  Sees podiatrist: No

## 2021-01-29 LAB
ALBUMIN SERPL-MCNC: 4.1 G/DL (ref 3.5–5.2)
ALBUMIN/CREAT UR: 485 MG/G CREAT (ref 0–29)
ALBUMIN/GLOB SERPL: 1.6 G/DL
ALP SERPL-CCNC: 82 U/L (ref 39–117)
ALT SERPL-CCNC: 15 U/L (ref 1–41)
AST SERPL-CCNC: 22 U/L (ref 1–40)
BASOPHILS # BLD AUTO: 0.07 10*3/MM3 (ref 0–0.2)
BASOPHILS NFR BLD AUTO: 0.8 % (ref 0–1.5)
BILIRUB SERPL-MCNC: 0.3 MG/DL (ref 0–1.2)
BUN SERPL-MCNC: 20 MG/DL (ref 8–23)
BUN/CREAT SERPL: 20.2 (ref 7–25)
CALCIUM SERPL-MCNC: 8.9 MG/DL (ref 8.6–10.5)
CHLORIDE SERPL-SCNC: 102 MMOL/L (ref 98–107)
CHOLEST SERPL-MCNC: 128 MG/DL (ref 0–200)
CO2 SERPL-SCNC: 31.3 MMOL/L (ref 22–29)
CREAT SERPL-MCNC: 0.99 MG/DL (ref 0.76–1.27)
CREAT UR-MCNC: 107.4 MG/DL
EOSINOPHIL # BLD AUTO: 0.29 10*3/MM3 (ref 0–0.4)
EOSINOPHIL NFR BLD AUTO: 3.2 % (ref 0.3–6.2)
ERYTHROCYTE [DISTWIDTH] IN BLOOD BY AUTOMATED COUNT: 13.5 % (ref 12.3–15.4)
GLOBULIN SER CALC-MCNC: 2.6 GM/DL
GLUCOSE SERPL-MCNC: 91 MG/DL (ref 65–99)
HBA1C MFR BLD: 6.7 % (ref 4.8–5.6)
HCT VFR BLD AUTO: 45.3 % (ref 37.5–51)
HDLC SERPL-MCNC: 35 MG/DL (ref 40–60)
HGB BLD-MCNC: 15.2 G/DL (ref 13–17.7)
IMM GRANULOCYTES # BLD AUTO: 0.02 10*3/MM3 (ref 0–0.05)
IMM GRANULOCYTES NFR BLD AUTO: 0.2 % (ref 0–0.5)
LDLC SERPL CALC-MCNC: 70 MG/DL (ref 0–100)
LYMPHOCYTES # BLD AUTO: 2.36 10*3/MM3 (ref 0.7–3.1)
LYMPHOCYTES NFR BLD AUTO: 25.7 % (ref 19.6–45.3)
MCH RBC QN AUTO: 28 PG (ref 26.6–33)
MCHC RBC AUTO-ENTMCNC: 33.6 G/DL (ref 31.5–35.7)
MCV RBC AUTO: 83.6 FL (ref 79–97)
MICROALBUMIN UR-MCNC: 520.8 UG/ML
MONOCYTES # BLD AUTO: 0.8 10*3/MM3 (ref 0.1–0.9)
MONOCYTES NFR BLD AUTO: 8.7 % (ref 5–12)
NEUTROPHILS # BLD AUTO: 5.63 10*3/MM3 (ref 1.7–7)
NEUTROPHILS NFR BLD AUTO: 61.4 % (ref 42.7–76)
NRBC BLD AUTO-RTO: 0 /100 WBC (ref 0–0.2)
PLATELET # BLD AUTO: 270 10*3/MM3 (ref 140–450)
POTASSIUM SERPL-SCNC: 4 MMOL/L (ref 3.5–5.2)
PROT SERPL-MCNC: 6.7 G/DL (ref 6–8.5)
RBC # BLD AUTO: 5.42 10*6/MM3 (ref 4.14–5.8)
SODIUM SERPL-SCNC: 143 MMOL/L (ref 136–145)
T4 FREE SERPL-MCNC: 1.54 NG/DL (ref 0.93–1.7)
TRIGL SERPL-MCNC: 130 MG/DL (ref 0–150)
TSH SERPL DL<=0.005 MIU/L-ACNC: 4.57 UIU/ML (ref 0.27–4.2)
VLDLC SERPL CALC-MCNC: 23 MG/DL (ref 5–40)
WBC # BLD AUTO: 9.17 10*3/MM3 (ref 3.4–10.8)

## 2021-02-01 ENCOUNTER — TELEPHONE (OUTPATIENT)
Dept: CARDIOLOGY | Facility: CLINIC | Age: 71
End: 2021-02-01

## 2021-02-01 RX ORDER — AMLODIPINE BESYLATE 5 MG/1
2.5 TABLET ORAL EVERY MORNING
Qty: 135 TABLET | Refills: 4 | Status: SHIPPED | OUTPATIENT
Start: 2021-02-01 | End: 2021-02-07 | Stop reason: SDUPTHER

## 2021-02-04 NOTE — TELEPHONE ENCOUNTER
2/4/21  Pt called - states he received the new amlodipine rx from [facility] - apparently this rx came from another  and the tablet is much smaller than his previous rx was.  He has tried to cut the 5 mg in half but the tablet just crushes.  He is wondering if he would be able to get a separate rx for the 2.5mg tab and he will take it in the morning and in the evening take the whole 5 mg tabs that he just received.  His ph [phone]/alberto

## 2021-02-07 RX ORDER — AMLODIPINE BESYLATE 2.5 MG/1
2.5 TABLET ORAL DAILY
Qty: 90 TABLET | Refills: 3 | Status: SHIPPED | OUTPATIENT
Start: 2021-02-07 | End: 2022-02-17

## 2021-02-07 RX ORDER — AMLODIPINE BESYLATE 5 MG/1
5 TABLET ORAL EVERY EVENING
Qty: 90 TABLET | Refills: 3 | Status: SHIPPED | OUTPATIENT
Start: 2021-02-07 | End: 2021-05-28 | Stop reason: SDUPTHER

## 2021-02-15 RX ORDER — CARVEDILOL 25 MG/1
25 TABLET ORAL 2 TIMES DAILY
Qty: 180 TABLET | Refills: 3 | Status: SHIPPED | OUTPATIENT
Start: 2021-02-15 | End: 2022-02-09

## 2021-02-18 RX ORDER — SYRGE-NDL,INS 0.5 ML HALF MARK 30 G X1/2"
SYRINGE, EMPTY DISPOSABLE MISCELLANEOUS
Qty: 200 EACH | Refills: 3 | Status: SHIPPED | OUTPATIENT
Start: 2021-02-18 | End: 2022-04-04

## 2021-03-09 DIAGNOSIS — Z23 IMMUNIZATION DUE: ICD-10-CM

## 2021-03-22 ENCOUNTER — TELEPHONE (OUTPATIENT)
Dept: FAMILY MEDICINE CLINIC | Facility: CLINIC | Age: 71
End: 2021-03-22

## 2021-03-22 NOTE — TELEPHONE ENCOUNTER
Pharmacy Name: [facility] - [facility] [address] - [phone]  - [phone]      Pharmacy representative name: RAFFY    Pharmacy representative phone number: [phone]    What medication are you calling in regards to: ALL OF THEM    What question does the pharmacy have: PHARMACY IS REQUESTING A FAX BE SENT OVER WITH HIS ENTIRE MEDICATION LIST. PHARMACIST DOES NOT HAVE A LIST OF CURRENT MEDICATIONS FOR HIM. PLEASE ADVISE    FAX:  [phone]    Who is the provider that prescribed the medication: DR RICHEY

## 2021-03-24 DIAGNOSIS — E11.9 CONTROLLED TYPE 2 DIABETES MELLITUS WITHOUT COMPLICATION, WITH LONG-TERM CURRENT USE OF INSULIN (HCC): ICD-10-CM

## 2021-03-24 DIAGNOSIS — Z79.4 CONTROLLED TYPE 2 DIABETES MELLITUS WITHOUT COMPLICATION, WITH LONG-TERM CURRENT USE OF INSULIN (HCC): ICD-10-CM

## 2021-03-24 RX ORDER — LISINOPRIL 40 MG/1
TABLET ORAL
Qty: 180 TABLET | Refills: 0 | Status: SHIPPED | OUTPATIENT
Start: 2021-03-24 | End: 2021-06-30

## 2021-03-24 RX ORDER — ATORVASTATIN CALCIUM 80 MG/1
TABLET, FILM COATED ORAL
Qty: 45 TABLET | Refills: 3 | Status: SHIPPED | OUTPATIENT
Start: 2021-03-24 | End: 2022-03-24

## 2021-05-30 RX ORDER — AMLODIPINE BESYLATE 5 MG/1
5 TABLET ORAL EVERY EVENING
Qty: 90 TABLET | Refills: 3 | Status: SHIPPED | OUTPATIENT
Start: 2021-05-30 | End: 2022-02-17

## 2021-06-29 NOTE — TELEPHONE ENCOUNTER
Lisinopril 40 mg  Last office visit 6/22/2020  Next office visit 8/11/2021  Last ekg 6/22/2020  Labs 1/28/2021

## 2021-06-30 RX ORDER — LISINOPRIL 40 MG/1
TABLET ORAL
Qty: 180 TABLET | Refills: 0 | Status: SHIPPED | OUTPATIENT
Start: 2021-06-30 | End: 2021-09-28

## 2021-07-09 ENCOUNTER — OFFICE VISIT (OUTPATIENT)
Dept: FAMILY MEDICINE CLINIC | Facility: CLINIC | Age: 71
End: 2021-07-09

## 2021-07-09 VITALS
WEIGHT: 220.2 LBS | BODY MASS INDEX: 31.52 KG/M2 | DIASTOLIC BLOOD PRESSURE: 70 MMHG | SYSTOLIC BLOOD PRESSURE: 110 MMHG | HEART RATE: 57 BPM | RESPIRATION RATE: 20 BRPM | HEIGHT: 70 IN | OXYGEN SATURATION: 96 % | TEMPERATURE: 96.8 F

## 2021-07-09 DIAGNOSIS — Z79.4 CONTROLLED TYPE 2 DIABETES MELLITUS WITHOUT COMPLICATION, WITH LONG-TERM CURRENT USE OF INSULIN (HCC): ICD-10-CM

## 2021-07-09 DIAGNOSIS — Z79.899 HIGH RISK MEDICATION USE: ICD-10-CM

## 2021-07-09 DIAGNOSIS — E78.5 HYPERLIPIDEMIA, UNSPECIFIED HYPERLIPIDEMIA TYPE: ICD-10-CM

## 2021-07-09 DIAGNOSIS — Z00.00 MEDICARE ANNUAL WELLNESS VISIT, SUBSEQUENT: Primary | ICD-10-CM

## 2021-07-09 DIAGNOSIS — I25.810 CORONARY ARTERY DISEASE INVOLVING CORONARY BYPASS GRAFT OF NATIVE HEART WITHOUT ANGINA PECTORIS: ICD-10-CM

## 2021-07-09 DIAGNOSIS — I10 ESSENTIAL HYPERTENSION: ICD-10-CM

## 2021-07-09 DIAGNOSIS — E11.9 CONTROLLED TYPE 2 DIABETES MELLITUS WITHOUT COMPLICATION, WITH LONG-TERM CURRENT USE OF INSULIN (HCC): ICD-10-CM

## 2021-07-09 DIAGNOSIS — E03.9 ACQUIRED HYPOTHYROIDISM: ICD-10-CM

## 2021-07-09 PROCEDURE — 96160 PT-FOCUSED HLTH RISK ASSMT: CPT | Performed by: FAMILY MEDICINE

## 2021-07-09 PROCEDURE — G0439 PPPS, SUBSEQ VISIT: HCPCS | Performed by: FAMILY MEDICINE

## 2021-07-09 NOTE — PROGRESS NOTES
The ABCs of the Annual Wellness Visit  Subsequent Medicare Wellness Visit    Chief Complaint   Patient presents with   • Medicare Wellness-subsequent       Subjective   History of Present Illness:  Eric Liu is a 71 y.o. male who presents for a Subsequent Medicare Wellness Visit.    HEALTH RISK ASSESSMENT    Recent Hospitalizations:  No hospitalization(s) within the last year.    Current Medical Providers:  Patient Care Team:  Ralph Chadwick MD as PCP - General  Ralph Chadwick MD as PCP - Family Medicine  Sharri Garcia MD as Consulting Physician (Cardiology)  Dc Mcgee MD as Consulting Physician (Urology)  Sánchez Malone MD as Consulting Physician (Plastic Surgery)  Tico Haynes MD as Surgeon (Neurosurgery)    Smoking Status:  Social History     Tobacco Use   Smoking Status Former Smoker   • Years: 20.00   • Quit date: 3/8/1986   • Years since quittin.3   Smokeless Tobacco Never Used   Tobacco Comment    Daily caffeine use       Alcohol Consumption:  Social History     Substance and Sexual Activity   Alcohol Use Yes    Comment: occasionally       Depression Screen:   PHQ-2/PHQ-9 Depression Screening 2021   Little interest or pleasure in doing things 0   Feeling down, depressed, or hopeless 0   Trouble falling or staying asleep, or sleeping too much 0   Feeling tired or having little energy 0   Poor appetite or overeating 0   Feeling bad about yourself - or that you are a failure or have let yourself or your family down 0   Trouble concentrating on things, such as reading the newspaper or watching television 0   Moving or speaking so slowly that other people could have noticed. Or the opposite - being so fidgety or restless that you have been moving around a lot more than usual 0   Thoughts that you would be better off dead, or of hurting yourself in some way 0   Total Score 0   If you checked off any problems, how difficult have these problems made it for you to do your work,  take care of things at home, or get along with other people? Not difficult at all       Fall Risk Screen:  MARIANNA Fall Risk Assessment was completed, and patient is at LOW risk for falls.Assessment completed on:7/9/2021    Health Habits and Functional and Cognitive Screening:  Functional & Cognitive Status 7/9/2021   Do you have difficulty preparing food and eating? No   Do you have difficulty bathing yourself, getting dressed or grooming yourself? No   Do you have difficulty using the toilet? No   Do you have difficulty moving around from place to place? No   Do you have trouble with steps or getting out of a bed or a chair? No   Current Diet Well Balanced Diet   Dental Exam Up to date   Eye Exam Up to date   Exercise (times per week) 5 times per week   Current Exercises Include Walking   Current Exercise Activities Include -   Do you need help using the phone?  No   Are you deaf or do you have serious difficulty hearing?  No   Do you need help with transportation? No   Do you need help shopping? No   Do you need help preparing meals?  No   Do you need help with housework?  No   Do you need help with laundry? No   Do you need help taking your medications? No   Do you need help managing money? No   Do you ever drive or ride in a car without wearing a seat belt? No   Have you felt unusual stress, anger or loneliness in the last month? No   Who do you live with? Spouse   If you need help, do you have trouble finding someone available to you? No   Have you been bothered in the last four weeks by sexual problems? No   Do you have difficulty concentrating, remembering or making decisions? No         Does the patient have evidence of cognitive impairment? NO    Asprin use counseling:Taking ASA appropriately as indicated    Age-appropriate Screening Schedule:  Refer to the list below for future screening recommendations based on patient's age, sex and/or medical conditions. Orders for these recommended tests are listed in  "the plan section. The patient has been provided with a written plan.    Health Maintenance   Topic Date Due   • DIABETIC FOOT EXAM  01/24/2021   • HEMOGLOBIN A1C  07/28/2021   • INFLUENZA VACCINE  08/01/2021   • LIPID PANEL  01/28/2022   • URINE MICROALBUMIN  01/28/2022   • DIABETIC EYE EXAM  02/09/2022   • TDAP/TD VACCINES (2 - Td or Tdap) 01/23/2029   • ZOSTER VACCINE  Completed          The following portions of the patient's history were reviewed and updated as appropriate: allergies, past family history and past social history.    Outpatient Medications Prior to Visit   Medication Sig Dispense Refill   • amLODIPine (NORVASC) 2.5 MG tablet Take 1 tablet by mouth Daily. 90 tablet 3   • amLODIPine (NORVASC) 5 MG tablet Take 1 tablet by mouth Every Evening. Pt has rx for 2.5mg that he takes in the AM 90 tablet 3   • aspirin 325 MG tablet Take 325 mg by mouth Daily. HOLD PER MD INSTR-STOPPED 5/27/2017.     • atorvastatin (LIPITOR) 80 MG tablet TAKE 1/2 TABLET EVERY DAY 45 tablet 3   • carvedilol (COREG) 25 MG tablet Take 1 tablet by mouth 2 (Two) Times a Day. 180 tablet 3   • Droplet Insulin Syringe 30G X 1/2\" 0.5 ML misc USE TWICE DAILY 200 each 3   • JARDIANCE 25 MG tablet TAKE 1 TABLET EVERY DAY AS DIRECTED 90 tablet 3   • levothyroxine (SYNTHROID, LEVOTHROID) 112 MCG tablet TAKE 1 TABLET EVERY DAY (NEED MD APPOINTMENT) 90 tablet 3   • lisinopril (PRINIVIL,ZESTRIL) 40 MG tablet TAKE 1 TABLET TWICE DAILY 180 tablet 0   • metFORMIN (GLUCOPHAGE) 1000 MG tablet Take 1 tablet by mouth 2 (Two) Times a Day With Meals. 180 tablet 3   • NovoLIN 70/30 (70-30) 100 UNIT/ML injection INJECT 40 UNITS UNDER THE SKIN INTO THE APPROPRIATE AREA AS DIRECTED TWO TIMES A DAY WITH MEALS. 70 mL 11   • Omega-3 Fatty Acids (FISH OIL) 1000 MG capsule capsule Take 1,000 mg by mouth Daily With Breakfast. HOLD PER MD INSTR-STOP 5/30/2017       No facility-administered medications prior to visit.       Patient Active Problem List   Diagnosis " "  • Coronary arteriosclerosis in native artery   • Hyperlipidemia   • Hypertension   • Fatigue   • Acquired hypothyroidism   • Lipoma of right upper extremity   • Decreased dorsalis pedis pulse   • Decreased pedal pulses   • Controlled type 2 diabetes mellitus without complication, with long-term current use of insulin (CMS/[facility])   • History of colon polyps   • Coronary artery disease involving coronary bypass graft of native heart without angina pectoris   • Encounter for screening for vascular disease       Advanced Care Planning:  ACP discussion was declined by the patient. Patient does not have an advance directive, declines further assistance.    Review of Systems   All other systems reviewed and are negative.      Compared to one year ago, the patient feels his physical health is the same.  Compared to one year ago, the patient feels his mental health is the same.    Reviewed chart for potential of high risk medication in the elderly: no  Reviewed chart for potential of harmful drug interactions in the elderly:no    Objective         Vitals:    07/09/21 0852   BP: 110/70   BP Location: Left arm   Patient Position: Sitting   Pulse: 57   Resp: 20   Temp: 96.8 °F (36 °C)   TempSrc: Temporal   SpO2: 96%   Weight: 99.9 kg (220 lb 3.2 oz)   Height: 177.8 cm (70\")       Body mass index is 31.6 kg/m².  Discussed the patient's BMI with him. The BMI is above average; BMI management plan is completed.    Physical Exam  Vitals and nursing note reviewed.   Constitutional:       General: He is not in acute distress.     Appearance: Normal appearance. He is well-developed. He is not ill-appearing.   HENT:      Head: Normocephalic and atraumatic.   Eyes:      Extraocular Movements: Extraocular movements intact.      Conjunctiva/sclera: Conjunctivae normal.      Pupils: Pupils are equal, round, and reactive to light.   Neck:      Thyroid: No thyromegaly.   Cardiovascular:      Rate and Rhythm: Normal rate and regular rhythm.    "   Heart sounds: Normal heart sounds.   Pulmonary:      Effort: Pulmonary effort is normal. No respiratory distress.      Breath sounds: Normal breath sounds.   Abdominal:      General: There is no distension.      Palpations: Abdomen is soft. There is no mass.      Tenderness: There is no abdominal tenderness.      Hernia: No hernia is present.   Musculoskeletal:         General: No tenderness or deformity. Normal range of motion.      Cervical back: Normal range of motion.   Lymphadenopathy:      Cervical: No cervical adenopathy.   Skin:     General: Skin is warm and dry.      Coloration: Skin is not pale.      Findings: No rash.   Neurological:      General: No focal deficit present.      Mental Status: He is alert and oriented to person, place, and time.      Motor: No abnormal muscle tone.      Coordination: Coordination normal.   Psychiatric:         Mood and Affect: Mood normal.         Behavior: Behavior normal.         Thought Content: Thought content normal.         Judgment: Judgment normal.               Assessment/Plan   Medicare Risks and Personalized Health Plan  CMS Preventative Services Quick Reference  Advance Directive Discussion  Cardiovascular risk  Obesity/Overweight     The above risks/problems have been discussed with the patient.  Pertinent information has been shared with the patient in the After Visit Summary.  Follow up plans and orders are seen below in the Assessment/Plan Section.    Diagnoses and all orders for this visit:    1. Medicare annual wellness visit, subsequent (Primary)    2. Controlled type 2 diabetes mellitus without complication, with long-term current use of insulin (CMS/[facility])  -     Comprehensive Metabolic Panel  -     Hemoglobin A1c    3. Hyperlipidemia, unspecified hyperlipidemia type  -     Lipid Panel    4. Essential hypertension  -     Comprehensive Metabolic Panel    5. Coronary artery disease involving coronary bypass graft of native heart without angina  pectoris    6. Acquired hypothyroidism  -     TSH+Free T4    7. High risk medication use  -     CBC & Differential  -     Comprehensive Metabolic Panel      Follow Up:  Patient here for 6-month follow-up of above-noted medical issues as well as health maintenance.  Overall seems to be doing excellent on current regimen which will be continued including follow-up visits every 6 months.  Encouraged weight loss and daily exercise.    This note includes information entered using a voice recognition dictation system.      An After Visit Summary and PPPS were given to the patient.

## 2021-07-10 LAB
ALBUMIN SERPL-MCNC: 4.4 G/DL (ref 3.5–5.2)
ALBUMIN/GLOB SERPL: 1.8 G/DL
ALP SERPL-CCNC: 84 U/L (ref 39–117)
ALT SERPL-CCNC: 16 U/L (ref 1–41)
AST SERPL-CCNC: 21 U/L (ref 1–40)
BASOPHILS # BLD AUTO: 0.06 10*3/MM3 (ref 0–0.2)
BASOPHILS NFR BLD AUTO: 0.7 % (ref 0–1.5)
BILIRUB SERPL-MCNC: 0.4 MG/DL (ref 0–1.2)
BUN SERPL-MCNC: 16 MG/DL (ref 8–23)
BUN/CREAT SERPL: 15.1 (ref 7–25)
CALCIUM SERPL-MCNC: 9 MG/DL (ref 8.6–10.5)
CHLORIDE SERPL-SCNC: 100 MMOL/L (ref 98–107)
CHOLEST SERPL-MCNC: 129 MG/DL (ref 0–200)
CO2 SERPL-SCNC: 29.8 MMOL/L (ref 22–29)
CREAT SERPL-MCNC: 1.06 MG/DL (ref 0.76–1.27)
EOSINOPHIL # BLD AUTO: 0.3 10*3/MM3 (ref 0–0.4)
EOSINOPHIL NFR BLD AUTO: 3.6 % (ref 0.3–6.2)
ERYTHROCYTE [DISTWIDTH] IN BLOOD BY AUTOMATED COUNT: 14.3 % (ref 12.3–15.4)
GLOBULIN SER CALC-MCNC: 2.4 GM/DL
GLUCOSE SERPL-MCNC: 86 MG/DL (ref 65–99)
HBA1C MFR BLD: 7 % (ref 4.8–5.6)
HCT VFR BLD AUTO: 46 % (ref 37.5–51)
HDLC SERPL-MCNC: 37 MG/DL (ref 40–60)
HGB BLD-MCNC: 14.8 G/DL (ref 13–17.7)
IMM GRANULOCYTES # BLD AUTO: 0.01 10*3/MM3 (ref 0–0.05)
IMM GRANULOCYTES NFR BLD AUTO: 0.1 % (ref 0–0.5)
LDLC SERPL CALC-MCNC: 71 MG/DL (ref 0–100)
LYMPHOCYTES # BLD AUTO: 2.1 10*3/MM3 (ref 0.7–3.1)
LYMPHOCYTES NFR BLD AUTO: 25.1 % (ref 19.6–45.3)
MCH RBC QN AUTO: 27.9 PG (ref 26.6–33)
MCHC RBC AUTO-ENTMCNC: 32.2 G/DL (ref 31.5–35.7)
MCV RBC AUTO: 86.6 FL (ref 79–97)
MONOCYTES # BLD AUTO: 0.77 10*3/MM3 (ref 0.1–0.9)
MONOCYTES NFR BLD AUTO: 9.2 % (ref 5–12)
NEUTROPHILS # BLD AUTO: 5.12 10*3/MM3 (ref 1.7–7)
NEUTROPHILS NFR BLD AUTO: 61.3 % (ref 42.7–76)
NRBC BLD AUTO-RTO: 0 /100 WBC (ref 0–0.2)
PLATELET # BLD AUTO: 247 10*3/MM3 (ref 140–450)
POTASSIUM SERPL-SCNC: 4 MMOL/L (ref 3.5–5.2)
PROT SERPL-MCNC: 6.8 G/DL (ref 6–8.5)
RBC # BLD AUTO: 5.31 10*6/MM3 (ref 4.14–5.8)
SODIUM SERPL-SCNC: 140 MMOL/L (ref 136–145)
T4 FREE SERPL-MCNC: 1.48 NG/DL (ref 0.93–1.7)
TRIGL SERPL-MCNC: 112 MG/DL (ref 0–150)
TSH SERPL DL<=0.005 MIU/L-ACNC: 2.97 UIU/ML (ref 0.27–4.2)
VLDLC SERPL CALC-MCNC: 21 MG/DL (ref 5–40)
WBC # BLD AUTO: 8.36 10*3/MM3 (ref 3.4–10.8)

## 2021-08-11 ENCOUNTER — OFFICE VISIT (OUTPATIENT)
Dept: CARDIOLOGY | Facility: CLINIC | Age: 71
End: 2021-08-11

## 2021-08-11 VITALS
HEART RATE: 61 BPM | SYSTOLIC BLOOD PRESSURE: 124 MMHG | DIASTOLIC BLOOD PRESSURE: 70 MMHG | WEIGHT: 218.6 LBS | BODY MASS INDEX: 31.3 KG/M2 | HEIGHT: 70 IN

## 2021-08-11 DIAGNOSIS — R94.31 ABNORMAL EKG: ICD-10-CM

## 2021-08-11 DIAGNOSIS — I10 ESSENTIAL HYPERTENSION: ICD-10-CM

## 2021-08-11 DIAGNOSIS — I25.810 CORONARY ARTERY DISEASE INVOLVING CORONARY BYPASS GRAFT OF NATIVE HEART WITHOUT ANGINA PECTORIS: Primary | ICD-10-CM

## 2021-08-11 PROCEDURE — 93000 ELECTROCARDIOGRAM COMPLETE: CPT | Performed by: NURSE PRACTITIONER

## 2021-08-11 PROCEDURE — 99214 OFFICE O/P EST MOD 30 MIN: CPT | Performed by: NURSE PRACTITIONER

## 2021-08-11 NOTE — PROGRESS NOTES
Date of Office Visit: 2021  Encounter Provider: Valentine Ryan, THA, APRN  Place of Service: [facility] CARDIOLOGY  Patient Name: Eric Liu  :1950        Subjective:     Chief Complaint:  Coronary artery disease, hypertension      History of Present Illness:  Eric Liu is a 71 y.o. male patient of Dr. Garcia.  This patient is new to me and I have reviewed his records.    Patient has a history of hypertension, diabetes, hyperlipidemia, coronary artery disease with bypass grafting in  with prior inferior wall infarction.    Patient has a history of coronary artery disease with bypass grafting in  with prior inferior wall infarction.  He has normal systolic function.  Bypass grafting with LIMA graft to LAD, vein graft RV branch, as well as vein graft to distal PDA and second obtuse marginal branch, circumflex artery, and third obtuse marginal branch.  He had postoperative fluid retention that resolved with diuretics.  He had intermittent chest pain after that time and a couple of stress tests which were negative.  Stress test 2013 showed small inferior wall infarction with minimal amount of richard-infarct ischemia.  EF was felt to possibly be reduced to 45%.  It was unchanged from prior study a year earlier.  Echocardiogram showed normal LV size and systolic function with hypokinesis of the inferior wall with EF of 61% and grade 1 diastolic dysfunction and moderate LVH but no significant valvular disease.  He was treated medically.  2016 he had worsening shortness of breath and fatigue and stress echo showed no evidence of ischemia at a good workload and no significant valvular disease and normal systolic function.      Patient presents to office today for follow-up appointment.  Patient reports he is doing well since last visit.  He is feeling great.  He is walking about 6 miles a week, usually walking for 40 minutes at a time which is a little over 2 miles  at a time.  Denies any exertional symptoms or concerns.  Denies any chest pain or discomfort, shortness of breath, shortness of breath with exertion, palpitations, racing heartbeat sensation, lower extremity edema, dizziness, syncope, near syncope, falls, fatigue, or abnormal bleeding.  Blood pressure at home stays 120/70 on average.        Past Medical History:   Diagnosis Date   • Acute bronchitis, unspecified    • Anemia    • Atherosclerotic heart disease of native coronary artery without angina pectoris    • CAD (coronary atherosclerotic disease)    • Chest pain    • Colon polyp    • Colonic mass    • High risk medication use    • History of transfusion    • Hyperlipidemia    • Hyperplasia of prostate without urinary obstruction    • Hypertension    • Hypothyroidism (acquired)    • Mass of hepatic flexure of colon    • Myocardial infarction (CMS/HCC)    • Retinal hemorrhage    • Type 2 diabetes mellitus (CMS/HCC)      Past Surgical History:   Procedure Laterality Date   • CARDIAC CATHETERIZATION Left 01/27/2011    Left heart catheterization, coronary angiogarphy, left ventriculography-Dr. Sánchez Casanova   • CARPAL TUNNEL RELEASE Left 9/6/2019    Procedure: DECOMPRESSION OF CARPAL TUNNEL LEFT HAND;  Surgeon: Sánchez Malone MD;  Location: [facility];  Service: Plastics   • CATARACT EXTRACTION Bilateral    • COLON RESECTION Right 6/1/2017    Procedure: LAPAROSCOPIC RIGHT COLON RESECTION;  Surgeon: Pollo Liao MD;  Location: [facility] OR;  Service:    • COLONOSCOPY N/A 01/31/2005    Colonic diverticulosis, internal hemorrhoids, and a few small colon polyps; Dr. Mushtaq Booth   • COLONOSCOPY N/A 1/24/2017    Procedure: COLONOSCOPY TO CECUM AND TERMINAL ILEUM WITH COLD BIOPSY AND HOT SNARE POLYPECTOMIES, INJECTED WITH NORMAL SALINE 3ML, INJECTED WITH SPOT INK 5ML, AND BIOPSIES;  Surgeon: Mushtaq Booth MD;  Location: [facility] ENDOSCOPY;  Service:    • COLONOSCOPY N/A 4/25/2017    Procedure: COLONOSCOPY to cecum  "and terminal ileum with cold polypectomy, and biopsies and tattoo (5cc) to mass at 75cm ;  Surgeon: Mushtaq Booth MD;  Location: [facility] ENDOSCOPY;  Service:    • COLONOSCOPY N/A 6/6/2018    Procedure: COLONOSCOPY TO ANASTOMOSIS AND INTO TERMINAL ILEUM;  Surgeon: Pollo Liao MD;  Location: [facility] ENDOSCOPY;  Service: Gastroenterology   • CORONARY ARTERY BYPASS GRAFT N/A 02/08/2011    Transesophageal echo, endoscopic vein harvest, coronary artery bypass graft x5, left internal mammary graft to the LAD, vein graft to RV branch, vein graft ot distal posterior descending artery, second marginal branch of the circumflex, third marginal branch of the circumflex, temporary cardiopulmonary bypass, antegrade and retrograde cold blood cardioplegia, warm reperfusion-Dr. Brandon Bucio   • NOSE SURGERY      Laser Suite Ret Treatment Pan-Ablated Inferior Nasal Retina   • SHOULDER MANIPULATION Left 09/01/2004    Manipulation under anesthesia and injection of left shoulder-Dr. INA Ceja   • TONSILLECTOMY N/A      Outpatient Medications Prior to Visit   Medication Sig Dispense Refill   • amLODIPine (NORVASC) 2.5 MG tablet Take 1 tablet by mouth Daily. 90 tablet 3   • amLODIPine (NORVASC) 5 MG tablet Take 1 tablet by mouth Every Evening. Pt has rx for 2.5mg that he takes in the AM 90 tablet 3   • aspirin 325 MG tablet Take 325 mg by mouth Daily. HOLD PER MD INSTR-STOPPED 5/27/2017.     • atorvastatin (LIPITOR) 80 MG tablet TAKE 1/2 TABLET EVERY DAY 45 tablet 3   • carvedilol (COREG) 25 MG tablet Take 1 tablet by mouth 2 (Two) Times a Day. 180 tablet 3   • Droplet Insulin Syringe 30G X 1/2\" 0.5 ML misc USE TWICE DAILY 200 each 3   • JARDIANCE 25 MG tablet TAKE 1 TABLET EVERY DAY AS DIRECTED 90 tablet 3   • levothyroxine (SYNTHROID, LEVOTHROID) 112 MCG tablet TAKE 1 TABLET EVERY DAY (NEED MD APPOINTMENT) 90 tablet 3   • lisinopril (PRINIVIL,ZESTRIL) 40 MG tablet TAKE 1 TABLET TWICE DAILY 180 tablet 0   • metFORMIN " "(GLUCOPHAGE) 1000 MG tablet Take 1 tablet by mouth 2 (Two) Times a Day With Meals. 180 tablet 3   • NovoLIN 70/30 (70-30) 100 UNIT/ML injection INJECT 40 UNITS UNDER THE SKIN INTO THE APPROPRIATE AREA AS DIRECTED TWO TIMES A DAY WITH MEALS. 70 mL 11   • Omega-3 Fatty Acids (FISH OIL) 1000 MG capsule capsule Take 1,000 mg by mouth Daily With Breakfast. HOLD PER MD INSTR-STOP 2017       No facility-administered medications prior to visit.       Allergies as of 2021   • (No Known Allergies)     Social History     Socioeconomic History   • Marital status:      Spouse name: Not on file   • Number of children: Not on file   • Years of education: Not on file   • Highest education level: Not on file   Tobacco Use   • Smoking status: Former Smoker     Years: 20.00     Quit date: 3/8/1986     Years since quittin.4   • Smokeless tobacco: Never Used   • Tobacco comment: Daily caffeine use   Substance and Sexual Activity   • Alcohol use: Yes     Comment: occasionally   • Drug use: No   • Sexual activity: Defer     Family History   Problem Relation Age of Onset   • Heart attack Father    • Heart disease Father    • Hypertension Other    • Malig Hyperthermia Neg Hx        Review of Systems   Constitutional: Negative for malaise/fatigue.   Cardiovascular:        SEE HPI   Respiratory: Negative for shortness of breath.    Hematologic/Lymphatic: Negative for bleeding problem.   Musculoskeletal: Negative for falls.   Gastrointestinal: Negative for melena.   Genitourinary: Negative for hematuria.   Neurological: Negative for dizziness.   Psychiatric/Behavioral: Negative for altered mental status.          Objective:     Vitals:    21 0856   BP: 124/70   BP Location: Left arm   Pulse: 61   Weight: 99.2 kg (218 lb 9.6 oz)   Height: 177.8 cm (70\")     Body mass index is 31.37 kg/m².      PHYSICAL EXAM:  Constitutional:       General: Not in acute distress.     Appearance: Well-developed. Not diaphoretic. "   Eyes:      Pupils: Pupils are equal, round, and reactive to light.   HENT:      Head: Normocephalic and atraumatic.   Neck:      Vascular: No carotid bruit or JVD.   Pulmonary:      Effort: Pulmonary effort is normal. No respiratory distress.      Breath sounds: Normal breath sounds. No wheezing. No rales.   Cardiovascular:      Normal rate. Regular rhythm.      Murmurs: There is no murmur.      No gallop. No click. No rub.   Edema:     Peripheral edema absent.   Abdominal:      General: Bowel sounds are normal. There is no distension.      Palpations: Abdomen is soft.   Musculoskeletal: Normal range of motion.         General: No tenderness or deformity.      Cervical back: Neck supple. Skin:     General: Skin is warm and dry.      Findings: No erythema or rash.   Neurological:      Mental Status: Alert and oriented to person, place, and time.   Psychiatric:         Behavior: Behavior normal.         Judgment: Judgment normal.             ECG 12 Lead    Date/Time: 8/11/2021 9:35 AM  Performed by: Valentine Ryan DNP, APRN  Authorized by: Valentine Ryan DNP, [facility]   Comparison: compared with previous ECG from 6/22/2020  Rhythm: sinus rhythm  Rate: normal  BPM: 61  Conduction: right bundle branch block  Comments: No significant changes from previous EKG              Assessment:       Diagnosis Plan   1. Coronary artery disease involving coronary bypass graft of native heart without angina pectoris  Treadmill Stress Test   2. Essential hypertension     3. Abnormal EKG  Treadmill Stress Test         Plan:     1. Coronary artery disease: History of prior myocardial infarction and CABG in 2011.  Negative stress test in 2016.  Denies anginal symptoms.  Dr. Garcia had recommended last visit that patient get follow-up treadmill exercise stress test as it had been several years since last ischemic evaluation.  Discussed with patient and he would like to go ahead and get this scheduled.  Order placed.  2. Hypertension:  "Blood pressure well controlled in the office today and running 120/70 on average at home.  Patient continue to monitor.  3. Chronic right bundle branch block  4. Dyslipidemia: PCP managing.  LDL goal less than 70.  5. Diabetes: Managed outside provider  6. Hypothyroidism: Managed outside provider  7. Carotid artery disease: With negative carotid Doppler March 2016 and vascular screening in 2019 showing no evidence of significant stenosis.    Patient to follow-up with Dr. Garcia in 1 year or sooner if needed for any new, recurrent, or worsening symptoms or other issues or concerns.  Discussed in detail signs/symptoms that warrant sooner call or follow-up to the office or ER visit.        Records reviewed including were not limited to 6/2020 office note, 6/2020 EKG, 6/2019 EKG, 6/2019 vascular screening.           Your medication list          Accurate as of August 11, 2021  9:36 AM. If you have any questions, ask your nurse or doctor.            CONTINUE taking these medications      Instructions Last Dose Given Next Dose Due   amLODIPine 2.5 MG tablet  Commonly known as: NORVASC      Take 1 tablet by mouth Daily.       amLODIPine 5 MG tablet  Commonly known as: NORVASC      Take 1 tablet by mouth Every Evening. Pt has rx for 2.5mg that he takes in the AM       aspirin 325 MG tablet      Take 325 mg by mouth Daily. HOLD PER MD INSTR-STOPPED 5/27/2017.       atorvastatin 80 MG tablet  Commonly known as: LIPITOR      TAKE 1/2 TABLET EVERY DAY       carvedilol 25 MG tablet  Commonly known as: COREG      Take 1 tablet by mouth 2 (Two) Times a Day.       Droplet Insulin Syringe 30G X 1/2\" 0.5 ML misc  Generic drug: Insulin Syringe-Needle U-100      USE TWICE DAILY       fish oil 1000 MG capsule capsule      Take 1,000 mg by mouth Daily With Breakfast. HOLD PER MD INSTR-STOP 5/30/2017       Jardiance 25 MG tablet tablet  Generic drug: empagliflozin      TAKE 1 TABLET EVERY DAY AS DIRECTED       levothyroxine 112 MCG " tablet  Commonly known as: SYNTHROID, LEVOTHROID      TAKE 1 TABLET EVERY DAY (NEED MD APPOINTMENT)       lisinopril 40 MG tablet  Commonly known as: PRINIVIL,ZESTRIL      TAKE 1 TABLET TWICE DAILY       metFORMIN 1000 MG tablet  Commonly known as: GLUCOPHAGE      Take 1 tablet by mouth 2 (Two) Times a Day With Meals.       NovoLIN 70/30 (70-30) 100 UNIT/ML injection  Generic drug: insulin NPH-insulin regular      INJECT 40 UNITS UNDER THE SKIN INTO THE APPROPRIATE AREA AS DIRECTED TWO TIMES A DAY WITH MEALS.              The above medication changes may not have been made by this provider.  Patient's medication list was updated to reflect medications they are currently taking including medication changes made by other providers.            Thanks,    Valentine Ryan, THA, APRN  08/11/2021         Dictated utilizing Dragon dictation

## 2021-08-23 DIAGNOSIS — Z79.4 CONTROLLED TYPE 2 DIABETES MELLITUS WITHOUT COMPLICATION, WITH LONG-TERM CURRENT USE OF INSULIN (HCC): ICD-10-CM

## 2021-08-23 DIAGNOSIS — E11.9 CONTROLLED TYPE 2 DIABETES MELLITUS WITHOUT COMPLICATION, WITH LONG-TERM CURRENT USE OF INSULIN (HCC): ICD-10-CM

## 2021-08-23 DIAGNOSIS — I25.10 CORONARY ARTERIOSCLEROSIS IN NATIVE ARTERY: ICD-10-CM

## 2021-08-23 RX ORDER — EMPAGLIFLOZIN 25 MG/1
TABLET, FILM COATED ORAL
Qty: 90 TABLET | Refills: 3 | Status: SHIPPED | OUTPATIENT
Start: 2021-08-23 | End: 2023-01-09 | Stop reason: SDUPTHER

## 2021-08-23 NOTE — TELEPHONE ENCOUNTER
Rx Refill Note  Requested Prescriptions     Pending Prescriptions Disp Refills   • Jardiance 25 MG tablet tablet [Pharmacy Med Name: JARDIANCE 25 MG Tablet] 90 tablet 3     Sig: TAKE 1 TABLET EVERY DAY AS DIRECTED      Last office visit with prescribing clinician: 7/9/2021      Next office visit with prescribing clinician: 1/10/2022            Jada Salazar MA  08/23/21, 13:07 EDT

## 2021-09-09 ENCOUNTER — HOSPITAL ENCOUNTER (OUTPATIENT)
Dept: CARDIOLOGY | Facility: HOSPITAL | Age: 71
End: 2021-09-09

## 2021-09-17 ENCOUNTER — TELEPHONE (OUTPATIENT)
Dept: CARDIOLOGY | Facility: CLINIC | Age: 71
End: 2021-09-17

## 2021-09-17 ENCOUNTER — HOSPITAL ENCOUNTER (OUTPATIENT)
Dept: CARDIOLOGY | Facility: HOSPITAL | Age: 71
Discharge: HOME | End: 2021-09-17
Admitting: NURSE PRACTITIONER

## 2021-09-17 DIAGNOSIS — R94.31 ABNORMAL EKG: ICD-10-CM

## 2021-09-17 DIAGNOSIS — I25.810 CORONARY ARTERY DISEASE INVOLVING CORONARY BYPASS GRAFT OF NATIVE HEART WITHOUT ANGINA PECTORIS: ICD-10-CM

## 2021-09-17 LAB
BH CV STRESS BP STAGE 1: NORMAL
BH CV STRESS BP STAGE 2: NORMAL
BH CV STRESS DURATION MIN STAGE 1: 3
BH CV STRESS DURATION MIN STAGE 2: 3
BH CV STRESS DURATION MIN STAGE 3: 1
BH CV STRESS DURATION SEC STAGE 1: 0
BH CV STRESS DURATION SEC STAGE 2: 0
BH CV STRESS DURATION SEC STAGE 3: 15
BH CV STRESS GRADE STAGE 1: 10
BH CV STRESS GRADE STAGE 2: 12
BH CV STRESS GRADE STAGE 3: 14
BH CV STRESS HR STAGE 1: 94
BH CV STRESS HR STAGE 2: 103
BH CV STRESS HR STAGE 3: 110
BH CV STRESS METS STAGE 1: 5
BH CV STRESS METS STAGE 2: 7.5
BH CV STRESS METS STAGE 3: 10
BH CV STRESS PROTOCOL 1: NORMAL
BH CV STRESS RECOVERY BP: NORMAL MMHG
BH CV STRESS RECOVERY HR: 82 BPM
BH CV STRESS RECOVERY O2: 97 %
BH CV STRESS SPEED STAGE 1: 1.7
BH CV STRESS SPEED STAGE 2: 2.5
BH CV STRESS SPEED STAGE 3: 3.4
BH CV STRESS STAGE 1: 1
BH CV STRESS STAGE 2: 2
BH CV STRESS STAGE 3: 3
MAXIMAL PREDICTED HEART RATE: 148 BPM
PERCENT MAX PREDICTED HR: 74.32 %
STRESS BASELINE BP: NORMAL MMHG
STRESS BASELINE HR: 71 BPM
STRESS PERCENT HR: 87 %
STRESS POST ESTIMATED WORKLOAD: 8.5 METS
STRESS POST EXERCISE DUR MIN: 7 MIN
STRESS POST EXERCISE DUR SEC: 15 SEC
STRESS POST O2 SAT PEAK: 92 %
STRESS POST PEAK BP: NORMAL MMHG
STRESS POST PEAK HR: 110 BPM
STRESS TARGET HR: 126 BPM

## 2021-09-17 PROCEDURE — 93017 CV STRESS TEST TRACING ONLY: CPT

## 2021-09-17 PROCEDURE — 93016 CV STRESS TEST SUPVJ ONLY: CPT | Performed by: INTERNAL MEDICINE

## 2021-09-17 PROCEDURE — 93018 CV STRESS TEST I&R ONLY: CPT | Performed by: INTERNAL MEDICINE

## 2021-09-17 NOTE — TELEPHONE ENCOUNTER
Called and spoke with patient.  Stress test without evidence of ischemia.  He continues to walk without symptoms, doing well.  He is going to continue to stay active and work on risk factor modification and will keep his follow-up with Dr. Garcia as scheduled or call sooner if he develops any symptoms or concerns.  Discussed in detail.

## 2021-09-28 RX ORDER — LISINOPRIL 40 MG/1
TABLET ORAL
Qty: 180 TABLET | Refills: 0 | Status: SHIPPED | OUTPATIENT
Start: 2021-09-28 | End: 2021-12-21

## 2021-11-04 ENCOUNTER — CLINICAL SUPPORT (OUTPATIENT)
Dept: FAMILY MEDICINE CLINIC | Facility: CLINIC | Age: 71
End: 2021-11-04

## 2021-11-04 DIAGNOSIS — Z23 NEED FOR INFLUENZA VACCINATION: Primary | ICD-10-CM

## 2021-11-04 PROCEDURE — G0008 ADMIN INFLUENZA VIRUS VAC: HCPCS | Performed by: FAMILY MEDICINE

## 2021-11-04 PROCEDURE — 90662 IIV NO PRSV INCREASED AG IM: CPT | Performed by: FAMILY MEDICINE

## 2021-11-29 DIAGNOSIS — E03.9 ACQUIRED HYPOTHYROIDISM: ICD-10-CM

## 2021-11-29 RX ORDER — LEVOTHYROXINE SODIUM 112 UG/1
TABLET ORAL
Qty: 90 TABLET | Refills: 3 | Status: SHIPPED | OUTPATIENT
Start: 2021-11-29 | End: 2023-01-12 | Stop reason: SDUPTHER

## 2021-12-21 RX ORDER — LISINOPRIL 40 MG/1
TABLET ORAL
Qty: 180 TABLET | Refills: 2 | Status: SHIPPED | OUTPATIENT
Start: 2021-12-21 | End: 2022-09-16

## 2022-01-10 ENCOUNTER — OFFICE VISIT (OUTPATIENT)
Dept: FAMILY MEDICINE CLINIC | Facility: CLINIC | Age: 72
End: 2022-01-10

## 2022-01-10 VITALS
HEIGHT: 70 IN | SYSTOLIC BLOOD PRESSURE: 120 MMHG | TEMPERATURE: 96.6 F | RESPIRATION RATE: 20 BRPM | HEART RATE: 61 BPM | DIASTOLIC BLOOD PRESSURE: 80 MMHG | WEIGHT: 218.2 LBS | OXYGEN SATURATION: 97 % | BODY MASS INDEX: 31.24 KG/M2

## 2022-01-10 DIAGNOSIS — Z86.010 HISTORY OF COLON POLYPS: ICD-10-CM

## 2022-01-10 DIAGNOSIS — I25.10 CORONARY ARTERIOSCLEROSIS IN NATIVE ARTERY: Primary | ICD-10-CM

## 2022-01-10 DIAGNOSIS — E03.9 ACQUIRED HYPOTHYROIDISM: ICD-10-CM

## 2022-01-10 DIAGNOSIS — Z79.4 CONTROLLED TYPE 2 DIABETES MELLITUS WITHOUT COMPLICATION, WITH LONG-TERM CURRENT USE OF INSULIN: ICD-10-CM

## 2022-01-10 DIAGNOSIS — I25.810 CORONARY ARTERY DISEASE INVOLVING CORONARY BYPASS GRAFT OF NATIVE HEART WITHOUT ANGINA PECTORIS: ICD-10-CM

## 2022-01-10 DIAGNOSIS — E78.5 HYPERLIPIDEMIA, UNSPECIFIED HYPERLIPIDEMIA TYPE: ICD-10-CM

## 2022-01-10 DIAGNOSIS — E11.9 CONTROLLED TYPE 2 DIABETES MELLITUS WITHOUT COMPLICATION, WITH LONG-TERM CURRENT USE OF INSULIN: ICD-10-CM

## 2022-01-10 DIAGNOSIS — Z79.899 HIGH RISK MEDICATION USE: ICD-10-CM

## 2022-01-10 DIAGNOSIS — I10 PRIMARY HYPERTENSION: ICD-10-CM

## 2022-01-10 PROCEDURE — 99214 OFFICE O/P EST MOD 30 MIN: CPT | Performed by: FAMILY MEDICINE

## 2022-01-10 NOTE — PROGRESS NOTES
HPI  Eric Liu is a 71 y.o. male who is here for follow up of diabetes known coronary artery disease and other issues.  Denies any new complaints and seems to be doing well on current regimen.  He is being seen by eye specialist every 6 months as well as routine follow-up with cardiologist.  Family member recently did have COVID.  Patient and his wife are fully vaccinated including boosters.      Review of Systems   Constitutional: Negative for fatigue.   Cardiovascular: Negative for chest pain.   Endocrine: Negative for polydipsia, polyphagia and polyuria.   Skin: Negative for pallor.   Neurological: Negative for dizziness, tremors, seizures, speech difficulty, weakness and headaches.   Psychiatric/Behavioral: Negative for confusion. The patient is not nervous/anxious.    All other systems reviewed and are negative.        Past Medical History:   Diagnosis Date   • Acute bronchitis, unspecified    • Anemia    • Atherosclerotic heart disease of native coronary artery without angina pectoris    • CAD (coronary atherosclerotic disease)    • Chest pain    • Colon polyp    • Colonic mass    • High risk medication use    • History of transfusion    • Hyperlipidemia    • Hyperplasia of prostate without urinary obstruction    • Hypertension    • Hypothyroidism (acquired)    • Mass of hepatic flexure of colon    • Myocardial infarction (HCC)    • Retinal hemorrhage    • Type 2 diabetes mellitus (HCC)        Past Surgical History:   Procedure Laterality Date   • CARDIAC CATHETERIZATION Left 01/27/2011    Left heart catheterization, coronary angiogarphy, left ventriculography-Dr. Sánchez Casanova   • CARPAL TUNNEL RELEASE Left 9/6/2019    Procedure: DECOMPRESSION OF CARPAL TUNNEL LEFT HAND;  Surgeon: Sánchez Malone MD;  Location: [facility];  Service: Plastics   • CATARACT EXTRACTION Bilateral    • COLON RESECTION Right 6/1/2017    Procedure: LAPAROSCOPIC RIGHT COLON RESECTION;  Surgeon: Pollo Liao MD;  Location:   [facility];  Service:    • COLONOSCOPY N/A 2005    Colonic diverticulosis, internal hemorrhoids, and a few small colon polyps; Dr. Mushtaq Booth   • COLONOSCOPY N/A 2017    Procedure: COLONOSCOPY TO CECUM AND TERMINAL ILEUM WITH COLD BIOPSY AND HOT SNARE POLYPECTOMIES, INJECTED WITH NORMAL SALINE 3ML, INJECTED WITH SPOT INK 5ML, AND BIOPSIES;  Surgeon: Mushtaq Booth MD;  Location: [facility] ENDOSCOPY;  Service:    • COLONOSCOPY N/A 2017    Procedure: COLONOSCOPY to cecum and terminal ileum with cold polypectomy, and biopsies and tattoo (5cc) to mass at 75cm ;  Surgeon: Mushtaq Booth MD;  Location: [facility] ENDOSCOPY;  Service:    • COLONOSCOPY N/A 2018    Procedure: COLONOSCOPY TO ANASTOMOSIS AND INTO TERMINAL ILEUM;  Surgeon: Pollo Liao MD;  Location: [facility] ENDOSCOPY;  Service: Gastroenterology   • CORONARY ARTERY BYPASS GRAFT N/A 2011    Transesophageal echo, endoscopic vein harvest, coronary artery bypass graft x5, left internal mammary graft to the LAD, vein graft to RV branch, vein graft ot distal posterior descending artery, second marginal branch of the circumflex, third marginal branch of the circumflex, temporary cardiopulmonary bypass, antegrade and retrograde cold blood cardioplegia, warm reperfusion-Dr. Brandon Bucio   • NOSE SURGERY      Laser Suite Ret Treatment Pan-Ablated Inferior Nasal Retina   • SHOULDER MANIPULATION Left 2004    Manipulation under anesthesia and injection of left shoulder-Dr. INA Ceja   • TONSILLECTOMY N/A        Family History   Problem Relation Age of Onset   • Heart attack Father    • Heart disease Father    • Hypertension Other    • Malig Hyperthermia Neg Hx        Social History     Socioeconomic History   • Marital status:    Tobacco Use   • Smoking status: Former Smoker     Years: 20.00     Quit date: 3/8/1986     Years since quittin.8   • Smokeless tobacco: Never Used   • Tobacco comment: Daily caffeine use    Substance and Sexual Activity   • Alcohol use: Yes     Comment: occasionally   • Drug use: No   • Sexual activity: Defer       Vitals:    01/10/22 0748   BP: 120/80   Pulse: 61   Resp: 20   Temp: 96.6 °F (35.9 °C)   SpO2: 97%        Body mass index is 31.31 kg/m².      Physical Exam  Vitals and nursing note reviewed.   Constitutional:       General: He is not in acute distress.     Appearance: He is well-developed.   HENT:      Head: Normocephalic and atraumatic.      Nose:      Comments: Patient with mask.  Provider with mask and shield  Eyes:      Conjunctiva/sclera: Conjunctivae normal.      Pupils: Pupils are equal, round, and reactive to light.   Neck:      Thyroid: No thyromegaly.   Cardiovascular:      Rate and Rhythm: Normal rate and regular rhythm.      Heart sounds: Normal heart sounds.   Pulmonary:      Effort: Pulmonary effort is normal. No respiratory distress.      Breath sounds: Normal breath sounds.   Abdominal:      General: There is no distension.      Palpations: Abdomen is soft. There is no mass.      Tenderness: There is no abdominal tenderness.      Hernia: No hernia is present.   Musculoskeletal:         General: No tenderness or deformity. Normal range of motion.      Cervical back: Normal range of motion.   Lymphadenopathy:      Cervical: No cervical adenopathy.   Skin:     General: Skin is warm and dry.      Coloration: Skin is not pale.      Findings: No rash.   Neurological:      General: No focal deficit present.      Mental Status: He is alert and oriented to person, place, and time.      Motor: No abnormal muscle tone.      Coordination: Coordination normal.   Psychiatric:         Mood and Affect: Mood normal.         Behavior: Behavior normal.         Thought Content: Thought content normal.         Judgment: Judgment normal.           Assessment/Plan    Diagnoses and all orders for this visit:    1. Coronary arteriosclerosis in native artery (Primary)    2. Coronary artery disease  involving coronary bypass graft of native heart without angina pectoris  -     Comprehensive Metabolic Panel    3. Hyperlipidemia, unspecified hyperlipidemia type  -     Lipid Panel    4. Primary hypertension  -     Comprehensive Metabolic Panel    5. Acquired hypothyroidism    6. Controlled type 2 diabetes mellitus without complication, with long-term current use of insulin (HCC)  -     Comprehensive Metabolic Panel  -     Lipid Panel  -     Hemoglobin A1c    7. History of colon polyps  -     CBC & Differential    8. High risk medication use  -     CBC & Differential  -     Comprehensive Metabolic Panel        Patient here for routine follow-up of multiple medical issues as noted above.  No new complaints and seems to be doing extremely well on current regimen which will be continued including follow-up every 6 months.    Answers for HPI/ROS submitted by the patient on 1/3/2022  What is the primary reason for your visit?: Diabetes  Diabetes type: type 2  MedicAlert ID: No  blurred vision: No  foot paresthesias: No  foot ulcerations: No  visual change: No  weight loss: No  Symptom course: stable  hunger: No  mood changes: No  sleepiness: No  sweats: No  blackouts: No  hospitalization: No  nocturnal hypoglycemia: No  required assistance: No  required glucagon: No  CVA: No  heart disease: Yes  impotence: Yes  nephropathy: No  peripheral neuropathy: No  PVD: No  retinopathy: No  Current treatments: diet, insulin injections, oral agent (dual therapy)  Treatment compliance: all of the time  Dose schedule: pre-breakfast, pre-dinner  Given by: patient  Injection sites: abdominal wall, thighs  Home blood tests: 1-2 x per week  Home urines: <1 x per month  Monitoring compliance: no compliance  Blood glucose trend: no change  breakfast time: 7-8 am  breakfast glucose level:   lunch time: 12-1 pm  lunch glucose level: 110-130  dinner time: 6-7 pm  dinner glucose level: 110-130  High score: 110-130  Overall:  110-130  Weight trend: stable  Current diet: generally healthy  Meal planning: none  Exercise: three times a week  Dietitian visit: No  Eye exam current: Yes  Sees podiatrist: No

## 2022-01-11 LAB
ALBUMIN SERPL-MCNC: 4 G/DL (ref 3.7–4.7)
ALBUMIN/GLOB SERPL: 1.6 {RATIO} (ref 1.2–2.2)
ALP SERPL-CCNC: 79 IU/L (ref 44–121)
ALT SERPL-CCNC: 18 IU/L (ref 0–44)
AST SERPL-CCNC: 23 IU/L (ref 0–40)
BASOPHILS # BLD AUTO: 0.1 X10E3/UL (ref 0–0.2)
BASOPHILS NFR BLD AUTO: 1 %
BILIRUB SERPL-MCNC: 0.3 MG/DL (ref 0–1.2)
BUN SERPL-MCNC: 22 MG/DL (ref 8–27)
BUN/CREAT SERPL: 21 (ref 10–24)
CALCIUM SERPL-MCNC: 8.7 MG/DL (ref 8.6–10.2)
CHLORIDE SERPL-SCNC: 107 MMOL/L (ref 96–106)
CHOLEST SERPL-MCNC: 128 MG/DL (ref 100–199)
CO2 SERPL-SCNC: 26 MMOL/L (ref 20–29)
CREAT SERPL-MCNC: 1.03 MG/DL (ref 0.76–1.27)
EOSINOPHIL # BLD AUTO: 0.2 X10E3/UL (ref 0–0.4)
EOSINOPHIL NFR BLD AUTO: 2 %
ERYTHROCYTE [DISTWIDTH] IN BLOOD BY AUTOMATED COUNT: 14 % (ref 11.6–15.4)
GLOBULIN SER CALC-MCNC: 2.5 G/DL (ref 1.5–4.5)
GLUCOSE SERPL-MCNC: 91 MG/DL (ref 65–99)
HBA1C MFR BLD: 6.7 % (ref 4.8–5.6)
HCT VFR BLD AUTO: 46.3 % (ref 37.5–51)
HDLC SERPL-MCNC: 36 MG/DL
HGB BLD-MCNC: 15.1 G/DL (ref 13–17.7)
IMM GRANULOCYTES # BLD AUTO: 0 X10E3/UL (ref 0–0.1)
IMM GRANULOCYTES NFR BLD AUTO: 0 %
LDLC SERPL CALC-MCNC: 67 MG/DL (ref 0–99)
LYMPHOCYTES # BLD AUTO: 2.6 X10E3/UL (ref 0.7–3.1)
LYMPHOCYTES NFR BLD AUTO: 29 %
MCH RBC QN AUTO: 27.7 PG (ref 26.6–33)
MCHC RBC AUTO-ENTMCNC: 32.6 G/DL (ref 31.5–35.7)
MCV RBC AUTO: 85 FL (ref 79–97)
MONOCYTES # BLD AUTO: 0.9 X10E3/UL (ref 0.1–0.9)
MONOCYTES NFR BLD AUTO: 10 %
NEUTROPHILS # BLD AUTO: 5.3 X10E3/UL (ref 1.4–7)
NEUTROPHILS NFR BLD AUTO: 58 %
PLATELET # BLD AUTO: 242 X10E3/UL (ref 150–450)
POTASSIUM SERPL-SCNC: 3.7 MMOL/L (ref 3.5–5.2)
PROT SERPL-MCNC: 6.5 G/DL (ref 6–8.5)
RBC # BLD AUTO: 5.45 X10E6/UL (ref 4.14–5.8)
SODIUM SERPL-SCNC: 145 MMOL/L (ref 134–144)
TRIGL SERPL-MCNC: 144 MG/DL (ref 0–149)
VLDLC SERPL CALC-MCNC: 25 MG/DL (ref 5–40)
WBC # BLD AUTO: 9.1 X10E3/UL (ref 3.4–10.8)

## 2022-01-20 RX ORDER — HUMAN INSULIN 100 [USP'U]/ML
INJECTION, SUSPENSION SUBCUTANEOUS
Qty: 70 ML | Refills: 11 | Status: SHIPPED | OUTPATIENT
Start: 2022-01-20 | End: 2023-01-09 | Stop reason: SDUPTHER

## 2022-02-09 RX ORDER — CARVEDILOL 25 MG/1
TABLET ORAL
Qty: 180 TABLET | Refills: 3 | Status: SHIPPED | OUTPATIENT
Start: 2022-02-09 | End: 2023-01-16 | Stop reason: SDUPTHER

## 2022-02-17 RX ORDER — AMLODIPINE BESYLATE 2.5 MG/1
TABLET ORAL
Qty: 90 TABLET | Refills: 2 | Status: SHIPPED | OUTPATIENT
Start: 2022-02-17 | End: 2022-11-14

## 2022-02-17 RX ORDER — AMLODIPINE BESYLATE 5 MG/1
TABLET ORAL
Qty: 90 TABLET | Refills: 2 | Status: SHIPPED | OUTPATIENT
Start: 2022-02-17 | End: 2022-11-14

## 2022-03-16 DIAGNOSIS — E11.9 CONTROLLED TYPE 2 DIABETES MELLITUS WITHOUT COMPLICATION, WITH LONG-TERM CURRENT USE OF INSULIN: ICD-10-CM

## 2022-03-16 DIAGNOSIS — Z79.4 CONTROLLED TYPE 2 DIABETES MELLITUS WITHOUT COMPLICATION, WITH LONG-TERM CURRENT USE OF INSULIN: ICD-10-CM

## 2022-03-21 ENCOUNTER — OFFICE VISIT (OUTPATIENT)
Dept: FAMILY MEDICINE CLINIC | Facility: CLINIC | Age: 72
End: 2022-03-21

## 2022-03-21 VITALS
TEMPERATURE: 96.8 F | RESPIRATION RATE: 20 BRPM | BODY MASS INDEX: 30.72 KG/M2 | HEIGHT: 70 IN | OXYGEN SATURATION: 96 % | SYSTOLIC BLOOD PRESSURE: 120 MMHG | WEIGHT: 214.6 LBS | HEART RATE: 66 BPM | DIASTOLIC BLOOD PRESSURE: 80 MMHG

## 2022-03-21 DIAGNOSIS — L98.9 LEG SKIN LESION, RIGHT: Primary | ICD-10-CM

## 2022-03-21 PROCEDURE — 99213 OFFICE O/P EST LOW 20 MIN: CPT | Performed by: FAMILY MEDICINE

## 2022-03-21 RX ORDER — DOXYCYCLINE 100 MG/1
100 CAPSULE ORAL 2 TIMES DAILY
Qty: 20 CAPSULE | Refills: 0 | Status: SHIPPED | OUTPATIENT
Start: 2022-03-21 | End: 2022-04-26

## 2022-03-21 NOTE — PROGRESS NOTES
HPI  Eric Liu is a 71 y.o. male who is here for follow up of wound infection on the right lower leg apparently for the last several weeks has had a swelling and is now somewhat sore.  Has been using Neosporin and Band-Aid.      Review of Systems   Skin: Positive for wound.   All other systems reviewed and are negative.        Past Medical History:   Diagnosis Date   • Acute bronchitis, unspecified    • Anemia    • Atherosclerotic heart disease of native coronary artery without angina pectoris    • CAD (coronary atherosclerotic disease)    • Chest pain    • Colon polyp    • Colonic mass    • High risk medication use    • History of transfusion    • Hyperlipidemia    • Hyperplasia of prostate without urinary obstruction    • Hypertension    • Hypothyroidism (acquired)    • Mass of hepatic flexure of colon    • Myocardial infarction (HCC)    • Retinal hemorrhage    • Type 2 diabetes mellitus (HCC)        Past Surgical History:   Procedure Laterality Date   • CARDIAC CATHETERIZATION Left 01/27/2011    Left heart catheterization, coronary angiogarphy, left ventriculography-Dr. Sánchez Casanova   • CARPAL TUNNEL RELEASE Left 9/6/2019    Procedure: DECOMPRESSION OF CARPAL TUNNEL LEFT HAND;  Surgeon: Sánchez Malone MD;  Location: [facility];  Service: Plastics   • CATARACT EXTRACTION Bilateral    • COLON RESECTION Right 6/1/2017    Procedure: LAPAROSCOPIC RIGHT COLON RESECTION;  Surgeon: Pollo Liao MD;  Location: [facility] OR;  Service:    • COLONOSCOPY N/A 01/31/2005    Colonic diverticulosis, internal hemorrhoids, and a few small colon polyps; Dr. Mushtaq Booth   • COLONOSCOPY N/A 1/24/2017    Procedure: COLONOSCOPY TO CECUM AND TERMINAL ILEUM WITH COLD BIOPSY AND HOT SNARE POLYPECTOMIES, INJECTED WITH NORMAL SALINE 3ML, INJECTED WITH SPOT INK 5ML, AND BIOPSIES;  Surgeon: Mushtaq Booth MD;  Location: [facility];  Service:    • COLONOSCOPY N/A 4/25/2017    Procedure: COLONOSCOPY to cecum and terminal  ileum with cold polypectomy, and biopsies and tattoo (5cc) to mass at 75cm ;  Surgeon: Mushtaq Booth MD;  Location:  [facility];  Service:    • COLONOSCOPY N/A 2018    Procedure: COLONOSCOPY TO ANASTOMOSIS AND INTO TERMINAL ILEUM;  Surgeon: Pollo Liao MD;  Location:  [facility];  Service: Gastroenterology   • CORONARY ARTERY BYPASS GRAFT N/A 2011    Transesophageal echo, endoscopic vein harvest, coronary artery bypass graft x5, left internal mammary graft to the LAD, vein graft to RV branch, vein graft ot distal posterior descending artery, second marginal branch of the circumflex, third marginal branch of the circumflex, temporary cardiopulmonary bypass, antegrade and retrograde cold blood cardioplegia, warm reperfusion-Dr. Brandon Bucio   • NOSE SURGERY      Laser Suite Ret Treatment Pan-Ablated Inferior Nasal Retina   • SHOULDER MANIPULATION Left 2004    Manipulation under anesthesia and injection of left shoulder-Dr. INA Ceja   • TONSILLECTOMY N/A        Family History   Problem Relation Age of Onset   • Heart attack Father    • Heart disease Father    • Hypertension Other    • Malig Hyperthermia Neg Hx        Social History     Socioeconomic History   • Marital status:    Tobacco Use   • Smoking status: Former Smoker     Years: 20.00     Quit date: 3/8/1986     Years since quittin.0   • Smokeless tobacco: Never Used   • Tobacco comment: Daily caffeine use   Substance and Sexual Activity   • Alcohol use: Yes     Comment: occasionally   • Drug use: No   • Sexual activity: Defer       Vitals:    22 1127   BP: 120/80   Pulse: 66   Resp: 20   Temp: 96.8 °F (36 °C)   SpO2: 96%        Body mass index is 30.79 kg/m².      Physical Exam  Vitals and nursing note reviewed.   Constitutional:       General: He is not in acute distress.     Appearance: He is well-developed.   HENT:      Head: Normocephalic and atraumatic.      Nose:      Comments: Patient with  mask.  Provider with mask and shield  Eyes:      Conjunctiva/sclera: Conjunctivae normal.      Pupils: Pupils are equal, round, and reactive to light.   Neck:      Thyroid: No thyromegaly.   Cardiovascular:      Rate and Rhythm: Normal rate and regular rhythm.      Heart sounds: Normal heart sounds.   Pulmonary:      Effort: Pulmonary effort is normal. No respiratory distress.      Breath sounds: Normal breath sounds.   Abdominal:      General: There is no distension.      Palpations: Abdomen is soft. There is no mass.      Tenderness: There is no abdominal tenderness.      Hernia: No hernia is present.   Musculoskeletal:         General: No tenderness or deformity. Normal range of motion.      Cervical back: Normal range of motion.   Lymphadenopathy:      Cervical: No cervical adenopathy.   Skin:     General: Skin is warm and dry.      Coloration: Skin is not pale.      Findings: No rash.          Neurological:      Mental Status: He is alert and oriented to person, place, and time.      Motor: No abnormal muscle tone.      Coordination: Coordination normal.   Psychiatric:         Behavior: Behavior normal.         Thought Content: Thought content normal.         Judgment: Judgment normal.           Assessment/Plan    Diagnoses and all orders for this visit:    1. Leg skin lesion, right (Primary)  -     Culture, Routine - Swab, Leg  -     Cancel: Ambulatory Referral to General Surgery  -     Ambulatory Referral to General Surgery      Patient presents with a growth on the right lower leg which he says has been present for a few weeks.  Has become slightly sore and there is seem to be small amount of purulent drainage.  Seem to be a cystic mass and tried to open and drain but seem to be full of solid material.  Did send for culture and will start empiric antibiotic therapy but referred to general surgeon for further evaluation and possible removal?

## 2022-03-23 DIAGNOSIS — E11.9 CONTROLLED TYPE 2 DIABETES MELLITUS WITHOUT COMPLICATION, WITH LONG-TERM CURRENT USE OF INSULIN: ICD-10-CM

## 2022-03-23 DIAGNOSIS — Z79.4 CONTROLLED TYPE 2 DIABETES MELLITUS WITHOUT COMPLICATION, WITH LONG-TERM CURRENT USE OF INSULIN: ICD-10-CM

## 2022-03-24 LAB
BACTERIA SPEC CULT: NORMAL
MICROORGANISM/AGENT SPEC: NORMAL

## 2022-03-24 RX ORDER — ATORVASTATIN CALCIUM 80 MG/1
TABLET, FILM COATED ORAL
Qty: 45 TABLET | Refills: 3 | Status: SHIPPED | OUTPATIENT
Start: 2022-03-24 | End: 2023-01-09 | Stop reason: SDUPTHER

## 2022-04-04 RX ORDER — SYRGE-NDL,INS 0.5 ML HALF MARK 30 G X1/2"
SYRINGE, EMPTY DISPOSABLE MISCELLANEOUS
Qty: 200 EACH | Refills: 3 | Status: SHIPPED | OUTPATIENT
Start: 2022-04-04 | End: 2023-01-09 | Stop reason: SDUPTHER

## 2022-04-04 NOTE — TELEPHONE ENCOUNTER
"Rx Refill Note  Requested Prescriptions     Pending Prescriptions Disp Refills   • Droplet Insulin Syringe 30G X 1/2\" 0.5 ML misc [Pharmacy Med Name: DROPLET INSULIN SYRINGE U-100/0.5ML/30G X 1/2\" 30G X 1/2\" 0.5 ML] 200 each 3     Sig: USE TWICE DAILY      Last office visit with prescribing clinician: 3/21/2022      Next office visit with prescribing clinician: 6/27/2022            Jada Salazar MA  04/04/22, 13:35 EDT  "

## 2022-04-26 ENCOUNTER — OFFICE VISIT (OUTPATIENT)
Dept: SURGERY | Facility: CLINIC | Age: 72
End: 2022-04-26

## 2022-04-26 VITALS — WEIGHT: 215.6 LBS | BODY MASS INDEX: 30.18 KG/M2 | HEIGHT: 71 IN

## 2022-04-26 DIAGNOSIS — C44.90 SKIN CANCER: Primary | ICD-10-CM

## 2022-04-26 PROCEDURE — 11602 EXC TR-EXT MAL+MARG 1.1-2 CM: CPT | Performed by: SURGERY

## 2022-04-26 PROCEDURE — 99203 OFFICE O/P NEW LOW 30 MIN: CPT | Performed by: SURGERY

## 2022-04-26 PROCEDURE — 88305 TISSUE EXAM BY PATHOLOGIST: CPT | Performed by: SURGERY

## 2022-04-26 NOTE — PROGRESS NOTES
Subjective   Eric Liu is a 71 y.o. male who presents to the office in surgical consultation from Ralph Chadwick MD for a skin lesion in the right lower extremity.    History of Present Illness     The patient has a 2-month history of a lesion in the lateral aspect of the right lower extremity that he attributes to a cyst.  It has gotten larger over the past 2 months and has an ulcerated center.  He has been treating it with local wound care with no improvement.  The area is mildly painful.  There has been no significant drainage.  There has never been trauma to the area.    Review of Systems   Constitutional: Negative for fatigue and fever.   Respiratory: Negative for chest tightness and shortness of breath.    Cardiovascular: Negative for chest pain and palpitations.   Gastrointestinal: Negative for abdominal pain, blood in stool, constipation, diarrhea, nausea and vomiting.     Past Medical History:   Diagnosis Date   • Acute bronchitis, unspecified    • Anemia    • Atherosclerotic heart disease of native coronary artery without angina pectoris    • CAD (coronary atherosclerotic disease)    • Chest pain    • Colon polyp    • Colonic mass    • High risk medication use    • History of transfusion    • Hyperlipidemia    • Hyperplasia of prostate without urinary obstruction    • Hypertension    • Hypothyroidism (acquired)    • Mass of hepatic flexure of colon    • Myocardial infarction (HCC)    • Retinal hemorrhage    • Type 2 diabetes mellitus (HCC)      Past Surgical History:   Procedure Laterality Date   • CARDIAC CATHETERIZATION Left 01/27/2011    Left heart catheterization, coronary angiogarphy, left ventriculography-Dr. Sánchez Casanova   • CARPAL TUNNEL RELEASE Left 9/6/2019    Procedure: DECOMPRESSION OF CARPAL TUNNEL LEFT HAND;  Surgeon: Sánchez Malone MD;  Location: [facility] OR [facility];  Service: Plastics   • CATARACT EXTRACTION Bilateral    • COLON RESECTION Right 6/1/2017    Procedure: LAPAROSCOPIC RIGHT  COLON RESECTION;  Surgeon: Pollo Liao MD;  Location: [facility] MA[facility] OR;  Service:    • COLONOSCOPY N/A 01/31/2005    Colonic diverticulosis, internal hemorrhoids, and a few small colon polyps; Dr. Mushtaq Booth   • COLONOSCOPY N/A 1/24/2017    Procedure: COLONOSCOPY TO CECUM AND TERMINAL ILEUM WITH COLD BIOPSY AND HOT SNARE POLYPECTOMIES, INJECTED WITH NORMAL SALINE 3ML, INJECTED WITH SPOT INK 5ML, AND BIOPSIES;  Surgeon: Mushtaq Booth MD;  Location: [facility];  Service:    • COLONOSCOPY N/A 4/25/2017    Procedure: COLONOSCOPY to cecum and terminal ileum with cold polypectomy, and biopsies and tattoo (5cc) to mass at 75cm ;  Surgeon: Mushtaq Booth MD;  Location: [facility];  Service:    • COLONOSCOPY N/A 6/6/2018    Procedure: COLONOSCOPY TO ANASTOMOSIS AND INTO TERMINAL ILEUM;  Surgeon: Pollo Liao MD;  Location: [facility] END[facility]COPY;  Service: Gastroenterology   • CORONARY ARTERY BYPASS GRAFT N/A 02/08/2011    Transesophageal echo, endoscopic vein harvest, coronary artery bypass graft x5, left internal mammary graft to the LAD, vein graft to RV branch, vein graft ot distal posterior descending artery, second marginal branch of the circumflex, third marginal branch of the circumflex, temporary cardiopulmonary bypass, antegrade and retrograde cold blood cardioplegia, warm reperfusion-Dr. Brandon Bucio   • NOSE SURGERY      Laser Suite Ret Treatment Pan-Ablated Inferior Nasal Retina   • SHOULDER MANIPULATION Left 09/01/2004    Manipulation under anesthesia and injection of left shoulder-Dr. INA Ceja   • TONSILLECTOMY N/A      Family History   Problem Relation Age of Onset   • Pancreatitis Mother    • Kidney failure Mother    • Heart attack Father    • Heart disease Father    • Hypertension Other    • Malig Hyperthermia Neg Hx      Social History     Socioeconomic History   • Marital status:    Tobacco Use   • Smoking status: Former Smoker     Packs/day: 1.00     Years: 20.00      Pack years: 20.00     Types: Cigarettes     Start date: 1967     Quit date: 3/8/1986     Years since quittin.1   • Smokeless tobacco: Never Used   • Tobacco comment: Daily caffeine use   Vaping Use   • Vaping Use: Never used   Substance and Sexual Activity   • Alcohol use: Yes     Alcohol/week: 2.0 standard drinks     Types: 2 Cans of beer per week     Comment: occasionally   • Drug use: No   • Sexual activity: Yes     Partners: Female       Objective   Physical Exam  Constitutional:       Appearance: Normal appearance. He is well-developed. He is not toxic-appearing.   Eyes:      General: No scleral icterus.  Pulmonary:      Effort: Pulmonary effort is normal. No respiratory distress.   Skin:     General: Skin is warm and dry.      Comments: There is a 1.8 cm raised, ulcerated skin lesion of the right lateral lower leg with no surrounding cellulitis.   Neurological:      Mental Status: He is alert and oriented to person, place, and time.   Psychiatric:         Behavior: Behavior normal.         Thought Content: Thought content normal.         Judgment: Judgment normal.     Procedure  The right lower extremity in the region of the skin cancer was prepped and draped in the standard surgical fashion.  The skin around the lesion was infiltrated with 1% lidocaine without epinephrine.  An elliptical incision was made around the lesion with a scalpel carried through the skin into the subcutaneous tissue.  The subcutaneous tissue was divided sharply and the lesion was elevated off of the subcutaneous tissue.  The specimen was passed off for pathology.  Hemostasis was noted to be adequate.  The skin was closed with a 4-0 nylon in an interrupted simple fashion.  Sterile dressing was applied.  The patient tolerated procedure well.    Assessment/Plan       The encounter diagnosis was Skin cancer.    The patient has a lesion on his right lower extremity that is highly suspicious for a skin cancer, either squamous cell  carcinoma or basal cell carcinoma.  An excision of the lesion was performed in the office.  Local wound care was discussed with him.  He will follow-up in 2 weeks.

## 2022-04-28 LAB
LAB AP CASE REPORT: NORMAL
PATH REPORT.FINAL DX SPEC: NORMAL
PATH REPORT.GROSS SPEC: NORMAL

## 2022-05-10 ENCOUNTER — OFFICE VISIT (OUTPATIENT)
Dept: SURGERY | Facility: CLINIC | Age: 72
End: 2022-05-10

## 2022-05-10 VITALS — HEIGHT: 71 IN | BODY MASS INDEX: 30.24 KG/M2 | WEIGHT: 216 LBS

## 2022-05-10 DIAGNOSIS — Z48.89 POSTOPERATIVE VISIT: Primary | ICD-10-CM

## 2022-05-10 PROCEDURE — 99024 POSTOP FOLLOW-UP VISIT: CPT | Performed by: SURGERY

## 2022-05-10 NOTE — PROGRESS NOTES
Antonio Liu is a 71 y.o. male who returns to the office after undergoing an excision of a squamous cell carcinoma on 4/26/2022.     History of Present Illness     The patient is recovering well with no significant postop symptoms.  He has not had any problems with his incision.    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    Review of Systems   Constitutional: Negative for activity change, appetite change, fatigue and fever.   Respiratory: Negative for chest tightness and shortness of breath.    Cardiovascular: Negative for chest pain and palpitations.   Gastrointestinal: Negative for abdominal pain, constipation, diarrhea and nausea.   Skin: Negative for rash and wound.   Psychiatric/Behavioral: Negative for agitation and confusion.       Objective   Physical Exam  Constitutional:       General: He is not in acute distress.     Appearance: Normal appearance. He is not ill-appearing or toxic-appearing.   Pulmonary:      Effort: Pulmonary effort is normal. No respiratory distress.   Skin:     Comments: Incision: intact with no evidence of infection.   Neurological:      Mental Status: He is alert.   Psychiatric:         Behavior: Behavior normal.         Assessment and plan:    1.  Postoperative visit: The patient is recovering well from the excision of the squamous cell carcinoma of his right lower extremity.  The sutures were removed in the office.  At this point he has no limitations.  No further treatment is necessary for the carcinoma.  He will follow-up on an as-needed basis.

## 2022-06-27 ENCOUNTER — OFFICE VISIT (OUTPATIENT)
Dept: FAMILY MEDICINE CLINIC | Facility: CLINIC | Age: 72
End: 2022-06-27

## 2022-06-27 VITALS
WEIGHT: 212.4 LBS | RESPIRATION RATE: 20 BRPM | BODY MASS INDEX: 29.73 KG/M2 | DIASTOLIC BLOOD PRESSURE: 68 MMHG | HEART RATE: 60 BPM | HEIGHT: 71 IN | SYSTOLIC BLOOD PRESSURE: 120 MMHG | TEMPERATURE: 96.6 F | OXYGEN SATURATION: 96 %

## 2022-06-27 DIAGNOSIS — Z86.010 HISTORY OF COLON POLYPS: ICD-10-CM

## 2022-06-27 DIAGNOSIS — Z79.899 HIGH RISK MEDICATION USE: ICD-10-CM

## 2022-06-27 DIAGNOSIS — I10 PRIMARY HYPERTENSION: ICD-10-CM

## 2022-06-27 DIAGNOSIS — Z79.4 CONTROLLED TYPE 2 DIABETES MELLITUS WITHOUT COMPLICATION, WITH LONG-TERM CURRENT USE OF INSULIN: ICD-10-CM

## 2022-06-27 DIAGNOSIS — I25.810 CORONARY ARTERY DISEASE INVOLVING CORONARY BYPASS GRAFT OF NATIVE HEART WITHOUT ANGINA PECTORIS: Primary | ICD-10-CM

## 2022-06-27 DIAGNOSIS — E03.9 ACQUIRED HYPOTHYROIDISM: ICD-10-CM

## 2022-06-27 DIAGNOSIS — E78.5 HYPERLIPIDEMIA, UNSPECIFIED HYPERLIPIDEMIA TYPE: ICD-10-CM

## 2022-06-27 DIAGNOSIS — E11.9 CONTROLLED TYPE 2 DIABETES MELLITUS WITHOUT COMPLICATION, WITH LONG-TERM CURRENT USE OF INSULIN: ICD-10-CM

## 2022-06-27 PROCEDURE — 99214 OFFICE O/P EST MOD 30 MIN: CPT | Performed by: FAMILY MEDICINE

## 2022-06-27 NOTE — PROGRESS NOTES
HPI  Eric Liu is a 72 y.o. male who is here for follow up especially of diabetes known atherosclerotic heart disease and hypertension.  Denies any new complaints and seems to be doing well on current regimen.  Also followed up by urologist as well as cardiologist.      Review of Systems   All other systems reviewed and are negative.        Past Medical History:   Diagnosis Date   • Acute bronchitis, unspecified    • Anemia    • Atherosclerotic heart disease of native coronary artery without angina pectoris    • CAD (coronary atherosclerotic disease)    • Chest pain    • Colon polyp    • Colonic mass    • High risk medication use    • History of transfusion    • Hyperlipidemia    • Hyperplasia of prostate without urinary obstruction    • Hypertension    • Hypothyroidism (acquired)    • Mass of hepatic flexure of colon    • Myocardial infarction (HCC)    • Retinal hemorrhage    • Type 2 diabetes mellitus (HCC)        Past Surgical History:   Procedure Laterality Date   • CARDIAC CATHETERIZATION Left 01/27/2011    Left heart catheterization, coronary angiogarphy, left ventriculography-Dr. Sánchez Casanova   • CARPAL TUNNEL RELEASE Left 9/6/2019    Procedure: DECOMPRESSION OF CARPAL TUNNEL LEFT HAND;  Surgeon: Sánchez Malone MD;  Location: [facility];  Service: Plastics   • CATARACT EXTRACTION Bilateral    • COLON RESECTION Right 6/1/2017    Procedure: LAPAROSCOPIC RIGHT COLON RESECTION;  Surgeon: Pollo Liao MD;  Location: [facility] OR;  Service:    • COLONOSCOPY N/A 01/31/2005    Colonic diverticulosis, internal hemorrhoids, and a few small colon polyps; Dr. Mushtaq Booth   • COLONOSCOPY N/A 1/24/2017    Procedure: COLONOSCOPY TO CECUM AND TERMINAL ILEUM WITH COLD BIOPSY AND HOT SNARE POLYPECTOMIES, INJECTED WITH NORMAL SALINE 3ML, INJECTED WITH SPOT INK 5ML, AND BIOPSIES;  Surgeon: Mushtaq Booth MD;  Location: [facility] ENDOSCOPY;  Service:    • COLONOSCOPY N/A 4/25/2017    Procedure: COLONOSCOPY to cecum and  terminal ileum with cold polypectomy, and biopsies and tattoo (5cc) to mass at 75cm ;  Surgeon: Mushtaq Booth MD;  [facility];  Service:    • COLONOSCOPY N/A 2018    Procedure: COLONOSCOPY TO ANASTOMOSIS AND INTO TERMINAL ILEUM;  Surgeon: Pollo Liao MD;  Location: [facility] END[facility]COPY;  Service: Gastroenterology   • CORONARY ARTERY BYPASS GRAFT N/A 2011    Transesophageal echo, endoscopic vein harvest, coronary artery bypass graft x5, left internal mammary graft to the LAD, vein graft to RV branch, vein graft ot distal posterior descending artery, second marginal branch of the circumflex, third marginal branch of the circumflex, temporary cardiopulmonary bypass, antegrade and retrograde cold blood cardioplegia, warm reperfusion-Dr. Brandon Bucio   • NOSE SURGERY      Laser Suite Ret Treatment Pan-Ablated Inferior Nasal Retina   • SHOULDER MANIPULATION Left 2004    Manipulation under anesthesia and injection of left shoulder-Dr. INA Ceja   • TONSILLECTOMY N/A        Family History   Problem Relation Age of Onset   • Pancreatitis Mother    • Kidney failure Mother    • Heart attack Father    • Heart disease Father    • Hypertension Other    • Malig Hyperthermia Neg Hx        Social History     Socioeconomic History   • Marital status:    Tobacco Use   • Smoking status: Former Smoker     Packs/day: 1.00     Years: 20.00     Pack years: 20.00     Types: Cigarettes     Start date: 1967     Quit date: 3/8/1986     Years since quittin.3   • Smokeless tobacco: Never Used   • Tobacco comment: Daily caffeine use   Vaping Use   • Vaping Use: Never used   Substance and Sexual Activity   • Alcohol use: Yes     Alcohol/week: 2.0 standard drinks     Types: 2 Cans of beer per week     Comment: occasionally   • Drug use: No   • Sexual activity: Yes     Partners: Female       Vitals:    22 0806   BP: 120/68   Pulse: 60   Resp: 20   Temp: 96.6 °F (35.9 °C)   SpO2: 96%         Body mass index is 29.62 kg/m².      Physical Exam  Vitals and nursing note reviewed.   Constitutional:       General: He is not in acute distress.     Appearance: He is well-developed.   HENT:      Head: Normocephalic and atraumatic.      Nose:      Comments: Patient with mask.  Provider with mask and shield  Eyes:      Conjunctiva/sclera: Conjunctivae normal.      Pupils: Pupils are equal, round, and reactive to light.   Neck:      Thyroid: No thyromegaly.   Cardiovascular:      Rate and Rhythm: Normal rate and regular rhythm.      Heart sounds: Normal heart sounds.   Pulmonary:      Effort: Pulmonary effort is normal. No respiratory distress.      Breath sounds: Normal breath sounds.   Abdominal:      General: There is no distension.      Palpations: Abdomen is soft. There is no mass.      Tenderness: There is no abdominal tenderness.      Hernia: No hernia is present.   Musculoskeletal:         General: No tenderness or deformity. Normal range of motion.      Cervical back: Normal range of motion.   Lymphadenopathy:      Cervical: No cervical adenopathy.   Skin:     General: Skin is warm and dry.      Coloration: Skin is not pale.      Findings: No rash.   Neurological:      General: No focal deficit present.      Mental Status: He is alert and oriented to person, place, and time.      Motor: No abnormal muscle tone.      Coordination: Coordination normal.   Psychiatric:         Mood and Affect: Mood normal.         Behavior: Behavior normal.         Thought Content: Thought content normal.         Judgment: Judgment normal.           Assessment/Plan    Diagnoses and all orders for this visit:    1. Coronary artery disease involving coronary bypass graft of native heart without angina pectoris (Primary)  -     Lipid Panel    2. Primary hypertension    3. Hyperlipidemia, unspecified hyperlipidemia type  -     Lipid Panel    4. Controlled type 2 diabetes mellitus without complication, with long-term current  use of insulin (HCC)  -     Comprehensive Metabolic Panel  -     Lipid Panel  -     Microalbumin / Creatinine Urine Ratio - Urine, Clean Catch  -     Hemoglobin A1c    5. Acquired hypothyroidism  -     TSH+Free T4    6. History of colon polyps  -     CBC & Differential    7. High risk medication use  -     CBC & Differential  -     Comprehensive Metabolic Panel      Patient here for routine follow-up of above-noted medical issues.  Overall seems to be doing well on current regimen.  Assuming lab test all okay most likely will follow-up with new provider in 6 months.  Did recommend getting second COVID booster.      Answers for HPI/ROS submitted by the patient on 6/20/2022  What is the primary reason for your visit?: Diabetes  Diabetes type: type 2  MedicAlert ID: No  Disease duration: 30 years  blackouts: No  hospitalization: No  nocturnal hypoglycemia: No  required assistance: No  required glucagon: No  Current treatments: diet, insulin injections, oral agent (dual therapy)  Treatment compliance: all of the time  Dose schedule: pre-breakfast, pre-dinner  Given by: patient  Injection sites: abdominal wall, thighs  Home blood tests: 1-2 x per week  Home urines: <1 x per month  Monitoring compliance: good  Blood glucose trend: no change  Weight trend: stable  Current diet: generally healthy  Meal planning: none  Exercise: three times a week  Dietitian visit: No  Eye exam current: Yes  Sees podiatrist: No

## 2022-06-28 LAB
ALBUMIN SERPL-MCNC: 4.5 G/DL (ref 3.7–4.7)
ALBUMIN/CREAT UR: 322 MG/G CREAT (ref 0–29)
ALBUMIN/GLOB SERPL: 1.9 {RATIO} (ref 1.2–2.2)
ALP SERPL-CCNC: 86 IU/L (ref 44–121)
ALT SERPL-CCNC: 18 IU/L (ref 0–44)
AST SERPL-CCNC: 22 IU/L (ref 0–40)
BASOPHILS # BLD AUTO: 0.1 X10E3/UL (ref 0–0.2)
BASOPHILS NFR BLD AUTO: 1 %
BILIRUB SERPL-MCNC: 0.3 MG/DL (ref 0–1.2)
BUN SERPL-MCNC: 16 MG/DL (ref 8–27)
BUN/CREAT SERPL: 15 (ref 10–24)
CALCIUM SERPL-MCNC: 9 MG/DL (ref 8.6–10.2)
CHLORIDE SERPL-SCNC: 101 MMOL/L (ref 96–106)
CHOLEST SERPL-MCNC: 138 MG/DL (ref 100–199)
CO2 SERPL-SCNC: 27 MMOL/L (ref 20–29)
CREAT SERPL-MCNC: 1.08 MG/DL (ref 0.76–1.27)
CREAT UR-MCNC: 61.1 MG/DL
EGFRCR SERPLBLD CKD-EPI 2021: 72 ML/MIN/1.73
EOSINOPHIL # BLD AUTO: 0.3 X10E3/UL (ref 0–0.4)
EOSINOPHIL NFR BLD AUTO: 3 %
ERYTHROCYTE [DISTWIDTH] IN BLOOD BY AUTOMATED COUNT: 14.2 % (ref 11.6–15.4)
GLOBULIN SER CALC-MCNC: 2.4 G/DL (ref 1.5–4.5)
GLUCOSE SERPL-MCNC: 108 MG/DL (ref 65–99)
HBA1C MFR BLD: 6.7 % (ref 4.8–5.6)
HCT VFR BLD AUTO: 48.2 % (ref 37.5–51)
HDLC SERPL-MCNC: 44 MG/DL
HGB BLD-MCNC: 15.4 G/DL (ref 13–17.7)
IMM GRANULOCYTES # BLD AUTO: 0 X10E3/UL (ref 0–0.1)
IMM GRANULOCYTES NFR BLD AUTO: 0 %
LDLC SERPL CALC-MCNC: 73 MG/DL (ref 0–99)
LYMPHOCYTES # BLD AUTO: 2.4 X10E3/UL (ref 0.7–3.1)
LYMPHOCYTES NFR BLD AUTO: 24 %
MCH RBC QN AUTO: 27.8 PG (ref 26.6–33)
MCHC RBC AUTO-ENTMCNC: 32 G/DL (ref 31.5–35.7)
MCV RBC AUTO: 87 FL (ref 79–97)
MICROALBUMIN UR-MCNC: 196.6 UG/ML
MONOCYTES # BLD AUTO: 0.9 X10E3/UL (ref 0.1–0.9)
MONOCYTES NFR BLD AUTO: 8 %
NEUTROPHILS # BLD AUTO: 6.5 X10E3/UL (ref 1.4–7)
NEUTROPHILS NFR BLD AUTO: 64 %
PLATELET # BLD AUTO: 259 X10E3/UL (ref 150–450)
POTASSIUM SERPL-SCNC: 3.9 MMOL/L (ref 3.5–5.2)
PROT SERPL-MCNC: 6.9 G/DL (ref 6–8.5)
RBC # BLD AUTO: 5.54 X10E6/UL (ref 4.14–5.8)
SODIUM SERPL-SCNC: 143 MMOL/L (ref 134–144)
T4 FREE SERPL-MCNC: 1.46 NG/DL (ref 0.82–1.77)
TRIGL SERPL-MCNC: 119 MG/DL (ref 0–149)
TSH SERPL DL<=0.005 MIU/L-ACNC: 2.37 UIU/ML (ref 0.45–4.5)
VLDLC SERPL CALC-MCNC: 21 MG/DL (ref 5–40)
WBC # BLD AUTO: 10.1 X10E3/UL (ref 3.4–10.8)

## 2022-08-11 ENCOUNTER — OFFICE VISIT (OUTPATIENT)
Dept: CARDIOLOGY | Facility: CLINIC | Age: 72
End: 2022-08-11

## 2022-08-11 VITALS
HEART RATE: 62 BPM | BODY MASS INDEX: 31.04 KG/M2 | HEIGHT: 70 IN | WEIGHT: 216.8 LBS | DIASTOLIC BLOOD PRESSURE: 70 MMHG | SYSTOLIC BLOOD PRESSURE: 118 MMHG

## 2022-08-11 DIAGNOSIS — Z79.4 CONTROLLED TYPE 2 DIABETES MELLITUS WITHOUT COMPLICATION, WITH LONG-TERM CURRENT USE OF INSULIN: ICD-10-CM

## 2022-08-11 DIAGNOSIS — I25.10 CORONARY ARTERIOSCLEROSIS IN NATIVE ARTERY: ICD-10-CM

## 2022-08-11 DIAGNOSIS — I10 PRIMARY HYPERTENSION: ICD-10-CM

## 2022-08-11 DIAGNOSIS — I25.810 CORONARY ARTERY DISEASE INVOLVING CORONARY BYPASS GRAFT OF NATIVE HEART WITHOUT ANGINA PECTORIS: Primary | ICD-10-CM

## 2022-08-11 DIAGNOSIS — E11.9 CONTROLLED TYPE 2 DIABETES MELLITUS WITHOUT COMPLICATION, WITH LONG-TERM CURRENT USE OF INSULIN: ICD-10-CM

## 2022-08-11 DIAGNOSIS — E78.5 HYPERLIPIDEMIA, UNSPECIFIED HYPERLIPIDEMIA TYPE: ICD-10-CM

## 2022-08-11 PROCEDURE — 93000 ELECTROCARDIOGRAM COMPLETE: CPT | Performed by: INTERNAL MEDICINE

## 2022-08-11 PROCEDURE — 99213 OFFICE O/P EST LOW 20 MIN: CPT | Performed by: INTERNAL MEDICINE

## 2022-08-11 NOTE — PROGRESS NOTES
Date of Office Visit: 2022  Encounter Provider: Sharri Garcia MD  Place of Service: [facility] CARDIOLOGY  Patient Name: Eric Liu  :1950    Chief complaint  CAD    History of Present Illness  The patient is a pleasant 70-year-old gentleman with diabetes, hypertension, hyperlipidemia, who has a history of coronary artery bypass grafting in  with prior inferior wall infarction. He has a normal systolic function. He underwent coronary artery bypass grafting with a LIMA graft to the LAD, vein graft to the RV branch, as well as a vein graft to the distal PDA and second obtuse marginal branch, circumflex artery and third obtuse marginal branch. He had postoperative fluid retention that resolved with diuretics. He has had intermittent chest pain since then and has had a couple of stress tests since then. His last stress test in 2013 revealed a small inferior wall infarction with minimal amount of richard-infarct ischemia. His ejection fraction was felt to be possibly reduced at 45%. It was unchanged from his prior study a year earlier. However, he had an echocardiogram that revealed normal left ventricular size and function with hypokinesis of the inferior wall with a 61% ejection fraction. There was grade I diastolic dysfunction, moderate left ventricular hypertrophy, no significant valvular dysfunction. He was treated medically.  2016 he was seen for worsening shortness of breath and fatigue a stress echocardiogram revealed no ischemia at a good workload with no significant valvular dysfunction and with normal systolic function.  And a follow-up treadmill exercise stress test on 2021 that was negative for ischemia at 8.5 METS.  He does not have resting hypertension.    Since last visit he is walking 3-4 times a week couple miles at a time.  He is also playing golf he denies any chest pain shortness of breath palpitations syncope near syncope.     Blood work 2020  includes normal CMP except for glucose of 108, hemoglobin A1c is stable at 6.7 thyroid studies normal, LDL 73, HDL 44, triglycerides 119, CBC unremarkable    Past Medical History:   Diagnosis Date   • Acute bronchitis, unspecified    • Anemia    • Atherosclerotic heart disease of native coronary artery without angina pectoris    • CAD (coronary atherosclerotic disease)    • Chest pain    • Colon polyp    • Colonic mass    • High risk medication use    • History of transfusion    • Hyperlipidemia    • Hyperplasia of prostate without urinary obstruction    • Hypertension    • Hypothyroidism (acquired)    • Mass of hepatic flexure of colon    • Myocardial infarction (HCC)    • Retinal hemorrhage    • Type 2 diabetes mellitus (HCC)      Past Surgical History:   Procedure Laterality Date   • CARDIAC CATHETERIZATION Left 01/27/2011    Left heart catheterization, coronary angiogarphy, left ventriculography-Dr. Sánchez Casanova   • CARPAL TUNNEL RELEASE Left 9/6/2019    Procedure: DECOMPRESSION OF CARPAL TUNNEL LEFT HAND;  Surgeon: Sánchez Malone MD;  Location: [facility] OR [facility];  Service: Plastics   • CATARACT EXTRACTION Bilateral    • COLON RESECTION Right 6/1/2017    Procedure: LAPAROSCOPIC RIGHT COLON RESECTION;  Surgeon: Pollo Liao MD;  Location: [facility] OR;  Service:    • COLONOSCOPY N/A 01/31/2005    Colonic diverticulosis, internal hemorrhoids, and a few small colon polyps; Dr. Mushtaq Booth   • COLONOSCOPY N/A 1/24/2017    Procedure: COLONOSCOPY TO CECUM AND TERMINAL ILEUM WITH COLD BIOPSY AND HOT SNARE POLYPECTOMIES, INJECTED WITH NORMAL SALINE 3ML, INJECTED WITH SPOT INK 5ML, AND BIOPSIES;  Surgeon: Mushtaq Booth MD;  Location: [facility] ENDOSCOPY;  Service:    • COLONOSCOPY N/A 4/25/2017    Procedure: COLONOSCOPY to cecum and terminal ileum with cold polypectomy, and biopsies and tattoo (5cc) to mass at 75cm ;  Surgeon: Mushtaq Booth MD;  Location: [facility] ENDOSCOPY;  Service:    • COLONOSCOPY N/A 6/6/2018     "Procedure: COLONOSCOPY TO ANASTOMOSIS AND INTO TERMINAL ILEUM;  Surgeon: Pollo Liao MD;  Location: [facility] ENDOSCOPY;  Service: Gastroenterology   • CORONARY ARTERY BYPASS GRAFT N/A 02/08/2011    Transesophageal echo, endoscopic vein harvest, coronary artery bypass graft x5, left internal mammary graft to the LAD, vein graft to RV branch, vein graft ot distal posterior descending artery, second marginal branch of the circumflex, third marginal branch of the circumflex, temporary cardiopulmonary bypass, antegrade and retrograde cold blood cardioplegia, warm reperfusion-Dr. Brandon Bucio   • NOSE SURGERY      Laser Suite Ret Treatment Pan-Ablated Inferior Nasal Retina   • SHOULDER MANIPULATION Left 09/01/2004    Manipulation under anesthesia and injection of left shoulder-Dr. INA Ceja   • TONSILLECTOMY N/A      Outpatient Medications Prior to Visit   Medication Sig Dispense Refill   • amLODIPine (NORVASC) 2.5 MG tablet TAKE 1 TABLET EVERY DAY 90 tablet 2   • amLODIPine (NORVASC) 5 MG tablet TAKE 1 TABLET EVERY EVENING 90 tablet 2   • aspirin 325 MG tablet Take 325 mg by mouth Daily. HOLD PER MD INSTR-STOPPED 5/27/2017.     • atorvastatin (LIPITOR) 80 MG tablet TAKE 1/2 TABLET EVERY DAY 45 tablet 3   • carvedilol (COREG) 25 MG tablet TAKE 1 TABLET TWICE DAILY 180 tablet 3   • Droplet Insulin Syringe 30G X 1/2\" 0.5 ML misc USE TWICE DAILY 200 each 3   • Jardiance 25 MG tablet tablet TAKE 1 TABLET EVERY DAY AS DIRECTED 90 tablet 3   • levothyroxine (SYNTHROID, LEVOTHROID) 112 MCG tablet TAKE 1 TABLET EVERY DAY (NEED MD APPOINTMENT) 90 tablet 3   • lisinopril (PRINIVIL,ZESTRIL) 40 MG tablet TAKE 1 TABLET TWICE DAILY 180 tablet 2   • metFORMIN (GLUCOPHAGE) 1000 MG tablet TAKE 1 TABLET BY MOUTH 2 TIMES A DAY WITH MEALS. 180 tablet 3   • NovoLIN 70/30 (70-30) 100 UNIT/ML injection INJECT 40 UNITS UNDER THE SKIN INTO THE APPROPRIATE AREA AS DIRECTED TWO TIMES A DAY WITH MEALS. 70 mL 11   • Omega-3 Fatty " "Acids (FISH OIL) 1000 MG capsule capsule Take 1,000 mg by mouth Daily With Breakfast. HOLD PER MD INSTR-STOP 2017       No facility-administered medications prior to visit.       Allergies as of 2022   • (No Known Allergies)     Social History     Socioeconomic History   • Marital status:    Tobacco Use   • Smoking status: Former Smoker     Packs/day: 1.00     Years: 20.00     Pack years: 20.00     Types: Cigarettes     Start date: 1967     Quit date: 3/8/1986     Years since quittin.4   • Smokeless tobacco: Never Used   • Tobacco comment: Daily caffeine use   Vaping Use   • Vaping Use: Never used   Substance and Sexual Activity   • Alcohol use: Yes     Alcohol/week: 2.0 standard drinks     Types: 2 Cans of beer per week     Comment: occasionally   • Drug use: No   • Sexual activity: Yes     Partners: Female     Family History   Problem Relation Age of Onset   • Pancreatitis Mother    • Kidney failure Mother    • Heart attack Father    • Heart disease Father    • Hypertension Other    • Malig Hyperthermia Neg Hx      Review of Systems   Constitutional: Negative for chills, fever, weight gain and weight loss.   Cardiovascular: Negative for leg swelling.   Respiratory: Negative for cough, snoring and wheezing.    Hematologic/Lymphatic: Negative for bleeding problem. Does not bruise/bleed easily.   Skin: Negative for color change.   Musculoskeletal: Negative for falls, joint pain and myalgias.   Gastrointestinal: Negative for melena.   Genitourinary: Negative for hematuria.   Neurological: Negative for excessive daytime sleepiness.   Psychiatric/Behavioral: Negative for depression. The patient is not nervous/anxious.         Objective:     Vitals:    22 1040   BP: 118/70   Pulse: 62   Weight: 98.3 kg (216 lb 12.8 oz)   Height: 177.8 cm (70\")     Body mass index is 31.11 kg/m².    Vitals reviewed.   Constitutional:       Appearance: Well-developed. Obese.   Eyes:      General: No scleral " icterus.        Right eye: No discharge.      Conjunctiva/sclera: Conjunctivae normal.      Pupils: Pupils are equal, round, and reactive to light.   HENT:      Head: Normocephalic.      Nose: Nose normal.   Neck:      Thyroid: No thyromegaly.      Vascular: No JVD.   Pulmonary:      Effort: Pulmonary effort is normal. No respiratory distress.      Breath sounds: Normal breath sounds. No wheezing. No rales.   Cardiovascular:      Normal rate. Regular rhythm. Normal S2.      Murmurs: There is no murmur.      No gallop.   Pulses:     Intact distal pulses.   Edema:     Peripheral edema absent.   Abdominal:      General: Bowel sounds are normal. There is no distension.      Palpations: Abdomen is soft.      Tenderness: There is no abdominal tenderness. There is no rebound.   Musculoskeletal: Normal range of motion.         General: No tenderness.      Cervical back: Normal range of motion and neck supple. Skin:     General: Skin is warm and dry.      Findings: No erythema or rash.   Neurological:      Mental Status: Alert and oriented to person, place, and time.   Psychiatric:         Behavior: Behavior normal.         Thought Content: Thought content normal.         Judgment: Judgment normal.       Lab Review:     ECG 12 Lead    Date/Time: 8/11/2022 10:41 AM  Performed by: Sharri Garcia MD  Authorized by: Sharri Garcia MD   Comparison: compared with previous ECG   Similar to previous ECG  Rhythm: sinus rhythm  Conduction: right bundle branch block and 1st degree AV block    Clinical impression: abnormal EKG          Assessment:       Diagnosis Plan   1. Coronary artery disease involving coronary bypass graft of native heart without angina pectoris  ECG 12 Lead   2. Coronary arteriosclerosis in native artery     3. Primary hypertension     4. Hyperlipidemia, unspecified hyperlipidemia type     5. Controlled type 2 diabetes mellitus without complication, with long-term current use of insulin (HCC)       Plan:       1.   "Coronary artery disease with history of prior myocardial infarction and CABG 2011. Negative stress test 9/20212.  Carotid artery disease, negative carotid Doppler and March 2016.  Negative vascular study 2019  3.  Hypertension.  Much lower at home.  Will continue current regimen  4.  Hyperlipidemia.  Within reasonable limits in January.  5.  Diabetes  6.  Hypothyroidism who works    Time Spent: I spent 25 minutes caring for Eric on this date of service. This time includes time spent by me in the following activities: preparing for the visit, reviewing tests, obtaining and/or reviewing a separately obtained history, performing a medically appropriate examination and/or evaluation, counseling and educating the patient/family/caregiver, documenting information in the medical record and independently interpreting results and communicating that information with the patient/family/caregiver.   I spent 1 minutes on the separately reported service of ECG. This time is not included in the time used to support the E/M service also reported today.        Your medication list          Accurate as of August 11, 2022 11:59 PM. If you have any questions, ask your nurse or doctor.            CONTINUE taking these medications      Instructions Last Dose Given Next Dose Due   amLODIPine 5 MG tablet  Commonly known as: NORVASC      TAKE 1 TABLET EVERY EVENING       amLODIPine 2.5 MG tablet  Commonly known as: NORVASC      TAKE 1 TABLET EVERY DAY       aspirin 325 MG tablet      Take 325 mg by mouth Daily. HOLD PER MD INSTR-STOPPED 5/27/2017.       atorvastatin 80 MG tablet  Commonly known as: LIPITOR      TAKE 1/2 TABLET EVERY DAY       carvedilol 25 MG tablet  Commonly known as: COREG      TAKE 1 TABLET TWICE DAILY       Droplet Insulin Syringe 30G X 1/2\" 0.5 ML misc  Generic drug: Insulin Syringe-Needle U-100      USE TWICE DAILY       fish oil 1000 MG capsule capsule      Take 1,000 mg by mouth Daily With Breakfast. HOLD PER MD " INSTR-STOP 5/30/2017       Jardiance 25 MG tablet tablet  Generic drug: empagliflozin      TAKE 1 TABLET EVERY DAY AS DIRECTED       levothyroxine 112 MCG tablet  Commonly known as: SYNTHROID, LEVOTHROID      TAKE 1 TABLET EVERY DAY (NEED MD APPOINTMENT)       lisinopril 40 MG tablet  Commonly known as: PRINIVIL,ZESTRIL      TAKE 1 TABLET TWICE DAILY       metFORMIN 1000 MG tablet  Commonly known as: GLUCOPHAGE      TAKE 1 TABLET BY MOUTH 2 TIMES A DAY WITH MEALS.       NovoLIN 70/30 (70-30) 100 UNIT/ML injection  Generic drug: insulin NPH-insulin regular      INJECT 40 UNITS UNDER THE SKIN INTO THE APPROPRIATE AREA AS DIRECTED TWO TIMES A DAY WITH MEALS.              Patient is no longer taking -.  I corrected the med list to reflect this.  I did not stop these medications.      Dictated utilizing Dragon dictation

## 2022-09-16 RX ORDER — LISINOPRIL 40 MG/1
TABLET ORAL
Qty: 180 TABLET | Refills: 1 | Status: SHIPPED | OUTPATIENT
Start: 2022-09-16

## 2022-11-14 RX ORDER — AMLODIPINE BESYLATE 5 MG/1
TABLET ORAL
Qty: 90 TABLET | Refills: 2 | Status: SHIPPED | OUTPATIENT
Start: 2022-11-14

## 2022-11-14 RX ORDER — AMLODIPINE BESYLATE 2.5 MG/1
TABLET ORAL
Qty: 90 TABLET | Refills: 2 | Status: SHIPPED | OUTPATIENT
Start: 2022-11-14

## 2022-12-29 ENCOUNTER — OFFICE VISIT (OUTPATIENT)
Dept: INTERNAL MEDICINE | Facility: CLINIC | Age: 72
End: 2022-12-29

## 2022-12-29 VITALS
DIASTOLIC BLOOD PRESSURE: 74 MMHG | HEART RATE: 69 BPM | TEMPERATURE: 96.3 F | OXYGEN SATURATION: 97 % | WEIGHT: 221 LBS | HEIGHT: 70 IN | SYSTOLIC BLOOD PRESSURE: 140 MMHG | BODY MASS INDEX: 31.64 KG/M2

## 2022-12-29 DIAGNOSIS — I25.810 CORONARY ARTERY DISEASE INVOLVING CORONARY BYPASS GRAFT OF NATIVE HEART WITHOUT ANGINA PECTORIS: ICD-10-CM

## 2022-12-29 DIAGNOSIS — E78.5 HYPERLIPIDEMIA, UNSPECIFIED HYPERLIPIDEMIA TYPE: ICD-10-CM

## 2022-12-29 DIAGNOSIS — E03.8 SUBCLINICAL HYPOTHYROIDISM: ICD-10-CM

## 2022-12-29 DIAGNOSIS — Z79.4 CONTROLLED TYPE 2 DIABETES MELLITUS WITHOUT COMPLICATION, WITH LONG-TERM CURRENT USE OF INSULIN: Primary | ICD-10-CM

## 2022-12-29 DIAGNOSIS — I10 ESSENTIAL HYPERTENSION: ICD-10-CM

## 2022-12-29 DIAGNOSIS — E11.9 CONTROLLED TYPE 2 DIABETES MELLITUS WITHOUT COMPLICATION, WITH LONG-TERM CURRENT USE OF INSULIN: Primary | ICD-10-CM

## 2022-12-29 LAB
BUN SERPL-MCNC: 15 MG/DL (ref 8–23)
BUN/CREAT SERPL: 14.7 (ref 7–25)
CALCIUM SERPL-MCNC: 8.9 MG/DL (ref 8.6–10.5)
CHLORIDE SERPL-SCNC: 106 MMOL/L (ref 98–107)
CO2 SERPL-SCNC: 26.8 MMOL/L (ref 22–29)
CREAT SERPL-MCNC: 1.02 MG/DL (ref 0.76–1.27)
EGFRCR SERPLBLD CKD-EPI 2021: 77.6 ML/MIN/1.73
GLUCOSE SERPL-MCNC: 65 MG/DL (ref 65–99)
HBA1C MFR BLD: 7 % (ref 4.8–5.6)
POTASSIUM SERPL-SCNC: 4 MMOL/L (ref 3.5–5.2)
SODIUM SERPL-SCNC: 144 MMOL/L (ref 136–145)

## 2022-12-29 PROCEDURE — 99214 OFFICE O/P EST MOD 30 MIN: CPT | Performed by: STUDENT IN AN ORGANIZED HEALTH CARE EDUCATION/TRAINING PROGRAM

## 2022-12-29 NOTE — PROGRESS NOTES
Nadir Ryder D.O.  Internal Medicine  [facility]  [address], [address]  [place] [address]  [phone]      Chief Complaint  Establish Care (Former patient of Dr. Chadwick) and Diabetes    SUBJECTIVE    History of Present Illness    Eric Liu is a 72 y.o. male who presents to the office today as a new patient to establish care.     Type 2 diabetes: diagnosed in late 1980s. Currently taking metformin 1000 mg twice daily, Novolin 70/30 40 units in the morning and 38 units in the evening with dinner as well as Jardiance 25 mg daily. He gets low blood sugar once every two to three months but then reports getting dizzy and lightheaded two times over the last few weeks but didn't check his blood sugar. He doesn't consistently check sugar at home.     BPH/ED: follows with [facility], not on medication for BPH. Previously used TRIMIX injections but he stopped because they do not work any more.     HLD, Coronary artery disease with history of prior myocardial infarction and CABG 2011: takes atorvastatin 40 mg daily and aspirin 325 mg daily. Follows with [facility].     Benign colon mass s/p laparoscopic right colon resection in 2017    HTN: takes carvedilol 25 mg twice daily, amlodipine 2.5 mg in the morning and 5 mg in the evening as well as lisinopril 40 mg daily. Checks BP 2-3 times per week, systolic is 110-120 and diastolic is 65-75.     Subclinical hypothyroidism: takes levothyroxine 112 mcg daily.     No Known Allergies     Outpatient Medications Marked as Taking for the 12/29/22 encounter (Office Visit) with Nadir Ryder, DO   Medication Sig Dispense Refill   • amLODIPine (NORVASC) 2.5 MG tablet TAKE 1 TABLET EVERY DAY 90 tablet 2   • amLODIPine (NORVASC) 5 MG tablet TAKE 1 TABLET EVERY EVENING 90 tablet 2   • aspirin 325 MG tablet Take 325 mg by mouth Daily. HOLD PER MD CISSE-STOPPED 5/27/2017.     • atorvastatin (LIPITOR) 80 MG tablet TAKE 1/2 TABLET EVERY DAY 45  "tablet 3   • carvedilol (COREG) 25 MG tablet TAKE 1 TABLET TWICE DAILY 180 tablet 3   • Droplet Insulin Syringe 30G X 1/2\" 0.5 ML misc USE TWICE DAILY 200 each 3   • Jardiance 25 MG tablet tablet TAKE 1 TABLET EVERY DAY AS DIRECTED 90 tablet 3   • levothyroxine (SYNTHROID, LEVOTHROID) 112 MCG tablet TAKE 1 TABLET EVERY DAY (NEED MD APPOINTMENT) 90 tablet 3   • lisinopril (PRINIVIL,ZESTRIL) 40 MG tablet TAKE 1 TABLET TWICE DAILY 180 tablet 1   • metFORMIN (GLUCOPHAGE) 1000 MG tablet TAKE 1 TABLET BY MOUTH 2 TIMES A DAY WITH MEALS. 180 tablet 3   • NovoLIN 70/30 (70-30) 100 UNIT/ML injection INJECT 40 UNITS UNDER THE SKIN INTO THE APPROPRIATE AREA AS DIRECTED TWO TIMES A DAY WITH MEALS. 70 mL 11   • Omega-3 Fatty Acids (FISH OIL) 1000 MG capsule capsule Take 1,000 mg by mouth Daily With Breakfast. HOLD PER MD INSTR-STOP 5/30/2017          Past Medical History:   Diagnosis Date   • Atherosclerotic heart disease of native coronary artery without angina pectoris    • CAD (coronary atherosclerotic disease)    • Chest pain    • Colon polyp    • Colonic mass    • History of transfusion    • Hyperlipidemia    • Hyperplasia of prostate without urinary obstruction    • Hypertension    • Mass of hepatic flexure of colon    • Myocardial infarction (HCC)    • Retinal hemorrhage    • Subclinical hypothyroidism    • Type 2 diabetes mellitus (HCC)      Past Surgical History:   Procedure Laterality Date   • CARDIAC CATHETERIZATION Left 01/27/2011    Left heart catheterization, coronary angiogarphy, left ventriculography-Dr. Sánchez Casanova   • CARPAL TUNNEL RELEASE Left 09/06/2019    Procedure: DECOMPRESSION OF CARPAL TUNNEL LEFT HAND;  Surgeon: Sánchez Malone MD;  Location: [facility];  Service: Plastics   • CATARACT EXTRACTION Bilateral    • COLON RESECTION Right 06/01/2017    Procedure: LAPAROSCOPIC RIGHT COLON RESECTION;  Surgeon: Pollo Liao MD;  Location: [facility] OR;  Service:    • COLONOSCOPY N/A 01/31/2005    " Colonic diverticulosis, internal hemorrhoids, and a few small colon polyps; Dr. Mushtaq Booth   • COLONOSCOPY N/A 01/24/2017    Procedure: COLONOSCOPY TO CECUM AND TERMINAL ILEUM WITH COLD BIOPSY AND HOT SNARE POLYPECTOMIES, INJECTED WITH NORMAL SALINE 3ML, INJECTED WITH SPOT INK 5ML, AND BIOPSIES;  Surgeon: Mushtaq Booth MD;  Location: [facility] ENDOSCOPY;  Service:    • COLONOSCOPY N/A 04/25/2017    Procedure: COLONOSCOPY to cecum and terminal ileum with cold polypectomy, and biopsies and tattoo (5cc) to mass at 75cm ;  Surgeon: Mushtaq Booth MD;  Location: [facility] ENDOSCOPY;  Service:    • COLONOSCOPY N/A 06/06/2018    Procedure: COLONOSCOPY TO ANASTOMOSIS AND INTO TERMINAL ILEUM;  Surgeon: Pollo Liao MD;  Location: [facility] ENDOSCOPY;  Service: Gastroenterology   • CORONARY ARTERY BYPASS GRAFT N/A 02/08/2011    Transesophageal echo, endoscopic vein harvest, coronary artery bypass graft x5, left internal mammary graft to the LAD, vein graft to RV branch, vein graft ot distal posterior descending artery, second marginal branch of the circumflex, third marginal branch of the circumflex, temporary cardiopulmonary bypass, antegrade and retrograde cold blood cardioplegia, warm reperfusion-Dr. Brandon Bucio   • EYE SURGERY      Laser Suite Ret Treatment Pan-Ablated Inferior Nasal Retina   • SHOULDER MANIPULATION Left 09/01/2004    Manipulation under anesthesia and injection of left shoulder-Dr. INA Ceja   • TONSILLECTOMY N/A      Family History   Problem Relation Age of Onset   • Pancreatitis Mother    • Kidney failure Mother    • Heart attack Father    • Heart disease Father    • No Known Problems Brother    • No Known Problems Brother    • Hypertension Other    • Malig Hyperthermia Neg Hx     reports that he quit smoking about 36 years ago. His smoking use included cigarettes. He started smoking about 55 years ago. He has a 20.00 pack-year smoking history. He has never used smokeless tobacco. He reports that  "he does not currently use alcohol after a past usage of about 2.0 standard drinks per week. He reports that he does not use drugs.    OBJECTIVE    Vital Signs:   /74   Pulse 69   Temp 96.3 °F (35.7 °C) (Infrared)   Ht 177.8 cm (70\")   Wt 100 kg (221 lb)   SpO2 97%   BMI 31.71 kg/m²     Physical Exam  Vitals reviewed.   Constitutional:       General: He is not in acute distress.     Appearance: Normal appearance. He is obese. He is not ill-appearing.   HENT:      Head: Atraumatic.      Comments: Negative Thu-Hallpike maneuver bilaterally     Right Ear: Tympanic membrane, ear canal and external ear normal. There is no impacted cerumen.      Left Ear: Tympanic membrane, ear canal and external ear normal. There is no impacted cerumen.   Eyes:      General: No scleral icterus.  Cardiovascular:      Rate and Rhythm: Normal rate and regular rhythm.      Heart sounds: Normal heart sounds. No murmur heard.  Pulmonary:      Effort: Pulmonary effort is normal. No respiratory distress.      Breath sounds: Normal breath sounds. No wheezing or rales.   Musculoskeletal:      Right lower leg: No edema.      Left lower leg: No edema.   Skin:     General: Skin is warm.      Coloration: Skin is not jaundiced.   Neurological:      Mental Status: He is alert.   Psychiatric:         Mood and Affect: Mood normal.         Behavior: Behavior normal.         Thought Content: Thought content normal.                             ASSESSMENT & PLAN     Diagnoses and all orders for this visit:    1. Controlled type 2 diabetes mellitus without complication, with long-term current use of insulin (HCC) (Primary)  -A1c trends on file for this patient:   A1C Last 3 Results    HGBA1C Last 3 Results 1/10/22 6/27/22   Hemoglobin A1C 6.7 (A) 6.7 (A)   (A) Abnormal value       Comments are available for some flowsheets but are not being displayed.           -Goal A1c for this patient is less than 6.5%  -Current diabetes regimen: Currently taking " metformin 1000 mg twice daily, Novolin 70/30 40 units in the morning and 38 units in the evening with dinner as well as Jardiance 25 mg daily. He gets low blood sugar once every two to three months but then reports getting dizzy and lightheaded two times over the last few weeks but didn't check his blood sugar. He doesn't consistently check sugar at home.   -Changes made to diabetes regimen today: recheck A1c, some questionable increased episodes of hypoglycemia. If A1c is downtrending will need to decrease his insulin regimen. I would like for him to start checking blood sugars during these episodes and reporting the result of his blood sugar to me   -Microalbuminuria screen: up to date and positive       2. Essential hypertension  - takes carvedilol 25 mg twice daily, amlodipine 2.5 mg in the morning and 5 mg in the evening as well as lisinopril 40 mg daily. Checks BP 2-3 times per week, systolic is 110-120 and diastolic is 65-75.   -BP higher than home average at 140/74 in office today  -given great control at home will continue current regimen  -recheck renal function and electrolytes today   -     Basic metabolic panel    3. Coronary artery disease involving coronary bypass graft of native heart without angina pectoris  4. Hyperlipidemia, unspecified hyperlipidemia type  -prior myocardial infarction and CABG 2011: takes atorvastatin 40 mg daily and aspirin 325 mg daily. Follows with [facility].   Lab Results   Component Value Date    CHLPL 138 06/27/2022    TRIG 119 06/27/2022    HDL 44 06/27/2022    LDL 73 06/27/2022     -last lipid with LDL near goal of 73, continue current regimen     5. Subclinical hypothyroidism  - takes levothyroxine 112 mcg daily  -last TSH at goal, continue current regimen and monitor TSH yearly   Lab Results   Component Value Date    TSH 2.370 06/27/2022               The following social determinates of health impact the patient's medical decision making: No social  determinates of health were factored in to today's visit.     Follow Up  Return in about 3 months (around 3/29/2023) for Medicare Wellness.    Patient/family had no further questions at this time and verbalized understanding of the plan discussed today.

## 2022-12-30 ENCOUNTER — PATIENT ROUNDING (BHMG ONLY) (OUTPATIENT)
Dept: INTERNAL MEDICINE | Facility: CLINIC | Age: 72
End: 2022-12-30

## 2022-12-30 NOTE — PROGRESS NOTES
December 30, 2022      Information collected during check out    [facility]  [address] [address] [phone].    Overall was the patient satisfied with first visit.  Yes      Thank you, and have a great day.

## 2023-01-09 ENCOUNTER — TELEPHONE (OUTPATIENT)
Dept: INTERNAL MEDICINE | Facility: CLINIC | Age: 73
End: 2023-01-09
Payer: MEDICARE

## 2023-01-09 DIAGNOSIS — I25.10 CORONARY ARTERIOSCLEROSIS IN NATIVE ARTERY: ICD-10-CM

## 2023-01-09 DIAGNOSIS — E11.9 CONTROLLED TYPE 2 DIABETES MELLITUS WITHOUT COMPLICATION, WITH LONG-TERM CURRENT USE OF INSULIN: ICD-10-CM

## 2023-01-09 DIAGNOSIS — E03.9 ACQUIRED HYPOTHYROIDISM: ICD-10-CM

## 2023-01-09 DIAGNOSIS — Z79.4 CONTROLLED TYPE 2 DIABETES MELLITUS WITHOUT COMPLICATION, WITH LONG-TERM CURRENT USE OF INSULIN: ICD-10-CM

## 2023-01-09 RX ORDER — LEVOTHYROXINE SODIUM 112 UG/1
112 TABLET ORAL DAILY
Qty: 90 TABLET | Refills: 3 | Status: CANCELLED | OUTPATIENT
Start: 2023-01-09

## 2023-01-12 ENCOUNTER — TELEPHONE (OUTPATIENT)
Dept: INTERNAL MEDICINE | Facility: CLINIC | Age: 73
End: 2023-01-12
Payer: MEDICARE

## 2023-01-12 DIAGNOSIS — E03.9 ACQUIRED HYPOTHYROIDISM: ICD-10-CM

## 2023-01-12 RX ORDER — LEVOTHYROXINE SODIUM 112 UG/1
112 TABLET ORAL DAILY
Qty: 90 TABLET | Refills: 2 | Status: SHIPPED | OUTPATIENT
Start: 2023-01-12 | End: 2023-04-06

## 2023-01-12 RX ORDER — LEVOTHYROXINE SODIUM 112 UG/1
112 TABLET ORAL DAILY
Qty: 10 TABLET | Refills: 0 | Status: SHIPPED | OUTPATIENT
Start: 2023-01-12

## 2023-01-12 NOTE — TELEPHONE ENCOUNTER
Patient called again today inquiring about his medication refills. He said that he his completely out of his     He would like call back when medication is sent to his pharmacy.   Best call back # [phone]    REQUEST FOR A NEW 90 DAY  PRESCRIPTION WITH REFILLS FOR THE FOLLOWING MEDICATIONS.  HE STATES HE IS COMPLETELY OUT OF THE LEVOTHYROXINE AND HAS 2 WEEKS REMAINING OF THE CARVEDILOL.  HE STATES HE IS GOOD WITH ALL OF THE OTHER MEDICATIONS AT THIS TIME.  HE IS WANTING TO KNOW IF HE CAN GET SAMPLES TO HOLD HIM UNTIL HE GETS THE MEDICATIONS THROUGH THE MAIL.        levothyroxine (SYNTHROID, LEVOTHROID) 112 MCG tablet       carvedilol (COREG) 25 MG tablet      [facility] - [place], [place] - [address] - [phone] [facility] [phone]   [phone]     PLEASE ADVISE.

## 2023-01-16 RX ORDER — CARVEDILOL 25 MG/1
25 TABLET ORAL 2 TIMES DAILY
Qty: 180 TABLET | Refills: 1 | Status: SHIPPED | OUTPATIENT
Start: 2023-01-16

## 2023-01-16 NOTE — TELEPHONE ENCOUNTER
Caller: Eric Liu    Relationship: Self    Best call back number: [phone]    Requested Prescriptions:   Requested Prescriptions     Pending Prescriptions Disp Refills   • carvedilol (COREG) 25 MG tablet 180 tablet 3     Sig: Take 1 tablet by mouth 2 (Two) Times a Day.        Pharmacy where request should be sent: [facility] - [facility] [address] - [phone]  - [phone]     Additional details provided by patient: PATIENT STATES THAT HE HAS 10-14 DAYS OF MEDICATION REMAINING    Does the patient have less than a 3 day supply:  [] Yes  [x] No    Would you like a call back once the refill request has been completed: [x] Yes [] No    If the office needs to give you a call back, can they leave a voicemail: [x] Yes [] No    Dayami Pearl Rep   01/16/23 08:37 EST

## 2023-01-17 RX ORDER — ASPIRIN 325 MG
325 TABLET ORAL DAILY
Qty: 90 TABLET | Refills: 3 | Status: SHIPPED | OUTPATIENT
Start: 2023-01-17

## 2023-01-17 RX ORDER — HUMAN INSULIN 100 [USP'U]/ML
INJECTION, SUSPENSION SUBCUTANEOUS
Qty: 70 ML | Refills: 1 | Status: SHIPPED | OUTPATIENT
Start: 2023-01-17

## 2023-01-17 RX ORDER — CARVEDILOL 25 MG/1
25 TABLET ORAL 2 TIMES DAILY
Qty: 180 TABLET | Refills: 1 | OUTPATIENT
Start: 2023-01-17

## 2023-01-17 RX ORDER — ATORVASTATIN CALCIUM 40 MG/1
40 TABLET, FILM COATED ORAL DAILY
Qty: 90 TABLET | Refills: 1 | Status: SHIPPED | OUTPATIENT
Start: 2023-01-17 | End: 2023-03-07 | Stop reason: SDUPTHER

## 2023-01-17 RX ORDER — SYRGE-NDL,INS 0.5 ML HALF MARK 30 G X1/2"
SYRINGE, EMPTY DISPOSABLE MISCELLANEOUS
Qty: 200 EACH | Refills: 3 | Status: SHIPPED | OUTPATIENT
Start: 2023-01-17

## 2023-01-17 RX ORDER — CHLORAL HYDRATE 500 MG
1000 CAPSULE ORAL 2 TIMES DAILY WITH MEALS
Qty: 180 CAPSULE | Refills: 1 | Status: SHIPPED | OUTPATIENT
Start: 2023-01-17

## 2023-03-07 DIAGNOSIS — E11.9 CONTROLLED TYPE 2 DIABETES MELLITUS WITHOUT COMPLICATION, WITH LONG-TERM CURRENT USE OF INSULIN: ICD-10-CM

## 2023-03-07 DIAGNOSIS — Z79.4 CONTROLLED TYPE 2 DIABETES MELLITUS WITHOUT COMPLICATION, WITH LONG-TERM CURRENT USE OF INSULIN: ICD-10-CM

## 2023-03-07 RX ORDER — ATORVASTATIN CALCIUM 40 MG/1
40 TABLET, FILM COATED ORAL DAILY
Qty: 90 TABLET | Refills: 1 | Status: SHIPPED | OUTPATIENT
Start: 2023-03-07

## 2023-04-06 ENCOUNTER — OFFICE VISIT (OUTPATIENT)
Dept: INTERNAL MEDICINE | Facility: CLINIC | Age: 73
End: 2023-04-06
Payer: MEDICARE

## 2023-04-06 VITALS
SYSTOLIC BLOOD PRESSURE: 130 MMHG | BODY MASS INDEX: 31.92 KG/M2 | OXYGEN SATURATION: 97 % | HEART RATE: 65 BPM | WEIGHT: 223 LBS | HEIGHT: 70 IN | DIASTOLIC BLOOD PRESSURE: 70 MMHG

## 2023-04-06 DIAGNOSIS — Z00.00 MEDICARE ANNUAL WELLNESS VISIT, SUBSEQUENT: Primary | ICD-10-CM

## 2023-04-06 DIAGNOSIS — Z79.4 CONTROLLED TYPE 2 DIABETES MELLITUS WITH MICROALBUMINURIA, WITH LONG-TERM CURRENT USE OF INSULIN: ICD-10-CM

## 2023-04-06 DIAGNOSIS — Z12.11 COLON CANCER SCREENING: ICD-10-CM

## 2023-04-06 DIAGNOSIS — Z00.00 ANNUAL PHYSICAL EXAM: ICD-10-CM

## 2023-04-06 DIAGNOSIS — R80.9 CONTROLLED TYPE 2 DIABETES MELLITUS WITH MICROALBUMINURIA, WITH LONG-TERM CURRENT USE OF INSULIN: ICD-10-CM

## 2023-04-06 DIAGNOSIS — E11.29 CONTROLLED TYPE 2 DIABETES MELLITUS WITH MICROALBUMINURIA, WITH LONG-TERM CURRENT USE OF INSULIN: ICD-10-CM

## 2023-04-06 LAB — HBA1C MFR BLD: 6.9 % (ref 4.8–5.6)

## 2023-04-06 NOTE — PROGRESS NOTES
The ABCs of the Annual Wellness Visit  Subsequent Medicare Wellness Visit    Subjective      Eric Liu is a 72 y.o. male who presents for a Subsequent Medicare Wellness Visit.    The following portions of the patient's history were reviewed and   updated as appropriate: allergies, current medications, past family history, past medical history, past social history, past surgical history and problem list.    Type 2 diabetes: diagnosed in late 1980s. Currently taking metformin 1000 mg twice daily, Novolin 70/30 40 units in the morning and 38 units in the evening with dinner as well as Jardiance 25 mg daily. Doesn't check blood sugar at home and denies any episodes of low blood sugar. He does have a blood glucose machine at home.      BPH/ED: follows with [facility], not on medication for BPH. Previously used TRIMIX injections but he stopped because they do not work any more.      HLD, Coronary artery disease with history of prior myocardial infarction and CABG 2011: takes atorvastatin 40 mg daily and aspirin 325 mg daily. Follows with [facility].      Benign colon mass s/p laparoscopic right colon resection in 2017     HTN: takes carvedilol 25 mg twice daily, amlodipine 2.5 mg in the morning and 5 mg in the evening as well as lisinopril 40 mg daily. Checks BP once or twice weekly at home, sees 120s systolic and 60s-70s diastolic.      Subclinical hypothyroidism: takes levothyroxine 112 mcg daily.     Compared to one year ago, the patient feels his physical   health is the same.    Compared to one year ago, the patient feels his mental   health is the same.    Recent Hospitalizations:  He was not admitted to the hospital during the last year.       Current Medical Providers:  Patient Care Team:  Nadir Ryder DO as PCP - General (Internal Medicine)  Sharri Garcia MD as Consulting Physician (Cardiology)  Dc Mcgee MD as Consulting Physician (Urology)  Sánchez Malone MD as Consulting Physician  "(Plastic Surgery)  Tico Haynes MD as Surgeon (Neurosurgery)    Outpatient Medications Prior to Visit   Medication Sig Dispense Refill   • amLODIPine (NORVASC) 2.5 MG tablet TAKE 1 TABLET EVERY DAY 90 tablet 2   • amLODIPine (NORVASC) 5 MG tablet TAKE 1 TABLET EVERY EVENING 90 tablet 2   • aspirin 325 MG tablet Take 1 tablet by mouth Daily. 90 tablet 3   • atorvastatin (LIPITOR) 40 MG tablet Take 1 tablet by mouth Daily. 90 tablet 1   • carvedilol (COREG) 25 MG tablet Take 1 tablet by mouth 2 (Two) Times a Day. 180 tablet 1   • empagliflozin (Jardiance) 25 MG tablet tablet Take 1 tablet by mouth Daily. 90 tablet 1   • insulin NPH-insulin regular (NovoLIN 70/30) (70-30) 100 UNIT/ML injection Inject 30 units in the morning 15 minutes before or after breakfast and 38 units in the evening 15 minutes before or after dinner. 70 mL 1   • Insulin Syringe-Needle U-100 (Droplet Insulin Syringe) 30G X 1/2\" 0.5 ML misc Use one syringe in the morning and one syringe in the evening to inject insulin as prescribed. 200 each 3   • levothyroxine (Synthroid) 112 MCG tablet Take 1 tablet by mouth Daily. 10 tablet 0   • lisinopril (PRINIVIL,ZESTRIL) 40 MG tablet TAKE 1 TABLET TWICE DAILY 180 tablet 1   • metFORMIN (GLUCOPHAGE) 1000 MG tablet Take 1 tablet by mouth 2 (Two) Times a Day With Meals. 180 tablet 1   • Omega-3 Fatty Acids (fish oil) 1000 MG capsule capsule Take 1 capsule by mouth 2 (Two) Times a Day With Meals. 180 capsule 1   • levothyroxine (SYNTHROID, LEVOTHROID) 112 MCG tablet Take 1 tablet by mouth Daily. (Patient not taking: Reported on 4/6/2023) 90 tablet 2     No facility-administered medications prior to visit.       No opioid medication identified on active medication list. I have reviewed chart for other potential  high risk medication/s and harmful drug interactions in the elderly.          Aspirin is on active medication list. Aspirin use is indicated based on review of current medical condition/s. Pros and " "cons of this therapy have been discussed today. Benefits of this medication outweigh potential harm.  Patient has been encouraged to continue taking this medication.  .      Patient Active Problem List   Diagnosis   • Coronary arteriosclerosis in native artery   • Hyperlipidemia   • Hypertension   • Fatigue   • Acquired hypothyroidism   • Lipoma of right upper extremity   • Decreased dorsalis pedis pulse   • Decreased pedal pulses   • Controlled type 2 diabetes mellitus without complication, with long-term current use of insulin   • History of colon polyps   • Coronary artery disease involving coronary bypass graft of native heart without angina pectoris   • Encounter for screening for vascular disease   • High risk medication use     Advance Care Planning   Advance Care Planning     Advance Directive is not on file.  ACP discussion was held with the patient during this visit. Patient does not have an advance directive, information provided.     Objective    Vitals:    04/06/23 1105   BP: 130/70   Pulse: 65   SpO2: 97%   Weight: 101 kg (223 lb)   Height: 177.8 cm (70\")       Physical Exam  Vitals reviewed.   Constitutional:       General: He is not in acute distress.     Appearance: Normal appearance. He is obese. He is not ill-appearing.   HENT:      Head: Normocephalic and atraumatic.      Right Ear: Tympanic membrane, ear canal and external ear normal. There is no impacted cerumen.      Left Ear: Ear canal and external ear normal. There is no impacted cerumen.      Ears:      Comments: Scarring left TM     Mouth/Throat:      Mouth: Mucous membranes are moist.      Pharynx: No oropharyngeal exudate or posterior oropharyngeal erythema.   Eyes:      General: No scleral icterus.     Extraocular Movements: Extraocular movements intact.      Conjunctiva/sclera: Conjunctivae normal.      Pupils: Pupils are equal, round, and reactive to light.      Comments: Pupils pinpoint b/l   Cardiovascular:      Rate and Rhythm: " "Normal rate and regular rhythm.      Heart sounds: Normal heart sounds. No murmur heard.  Pulmonary:      Effort: Pulmonary effort is normal. No respiratory distress.      Breath sounds: Normal breath sounds. No wheezing.   Abdominal:      General: Bowel sounds are normal. There is no distension.      Palpations: Abdomen is soft.      Tenderness: There is no abdominal tenderness. There is no guarding.      Hernia: A hernia (ventral, nontender) is present.   Musculoskeletal:      Cervical back: Neck supple. No tenderness.      Right lower leg: No edema.      Left lower leg: No edema.   Lymphadenopathy:      Cervical: No cervical adenopathy.   Skin:     General: Skin is warm and dry.      Coloration: Skin is not jaundiced.   Neurological:      General: No focal deficit present.      Mental Status: He is alert and oriented to person, place, and time.      Cranial Nerves: No cranial nerve deficit.      Motor: No weakness.   Psychiatric:         Mood and Affect: Mood normal.         Behavior: Behavior normal.         Thought Content: Thought content normal.           Estimated body mass index is 32 kg/m² as calculated from the following:    Height as of this encounter: 177.8 cm (70\").    Weight as of this encounter: 101 kg (223 lb).    BMI is >= 30 and <35. (Class 1 Obesity). The following options were offered after discussion;: exercise counseling/recommendations and nutrition counseling/recommendations      Does the patient have evidence of cognitive impairment?   No            HEALTH RISK ASSESSMENT    Smoking Status:  Social History     Tobacco Use   Smoking Status Former   • Packs/day: 1.00   • Years: 20.00   • Pack years: 20.00   • Types: Cigarettes   • Start date: 1967   • Quit date: 3/8/1986   • Years since quittin.1   Smokeless Tobacco Never     Alcohol Consumption:  Social History     Substance and Sexual Activity   Alcohol Use Yes   • Alcohol/week: 2.0 standard drinks   • Types: 2 Cans of beer per " week     Fall Risk Screen:    STEADI Fall Risk Assessment was completed, and patient is at MODERATE risk for falls. Assessment completed on:4/6/2023    Depression Screening:  PHQ-2/PHQ-9 Depression Screening 4/6/2023   Little Interest or Pleasure in Doing Things 0-->not at all   Feeling Down, Depressed or Hopeless 0-->not at all   PHQ-9: Brief Depression Severity Measure Score 0       Health Habits and Functional and Cognitive Screening:  Functional & Cognitive Status 4/6/2023   Do you have difficulty preparing food and eating? No   Do you have difficulty bathing yourself, getting dressed or grooming yourself? No   Do you have difficulty using the toilet? No   Do you have difficulty moving around from place to place? No   Do you have trouble with steps or getting out of a bed or a chair? No   Current Diet Well Balanced Diet   Dental Exam Up to date   Eye Exam Up to date   Exercise (times per week) 4 times per week   Current Exercises Include Walking   Current Exercise Activities Include -   Do you need help using the phone?  No   Are you deaf or do you have serious difficulty hearing?  No   Do you need help with transportation? No   Do you need help shopping? No   Do you need help preparing meals?  No   Do you need help with housework?  No   Do you need help with laundry? No   Do you need help taking your medications? No   Do you need help managing money? No   Do you ever drive or ride in a car without wearing a seat belt? No   Have you felt unusual stress, anger or loneliness in the last month? No   Who do you live with? Spouse   If you need help, do you have trouble finding someone available to you? No   Have you been bothered in the last four weeks by sexual problems? -   Do you have difficulty concentrating, remembering or making decisions? No       Age-appropriate Screening Schedule:  Refer to the list below for future screening recommendations based on patient's age, sex and/or medical conditions. Orders for  these recommended tests are listed in the plan section. The patient has been provided with a written plan.    Health Maintenance   Topic Date Due   • COVID-19 Vaccine (4 - Booster for Pfizer series) 01/13/2022   • DIABETIC FOOT EXAM  07/09/2022   • LIPID PANEL  06/27/2023   • URINE MICROALBUMIN  06/27/2023   • HEMOGLOBIN A1C  06/29/2023   • INFLUENZA VACCINE  08/01/2023   • DIABETIC EYE EXAM  10/03/2023   • ANNUAL WELLNESS VISIT  04/06/2024   • COLORECTAL CANCER SCREENING  06/06/2028   • TDAP/TD VACCINES (2 - Td or Tdap) 01/23/2029   • HEPATITIS C SCREENING  Completed   • Pneumococcal Vaccine 65+  Completed   • AAA SCREEN (ONE-TIME)  Completed   • ZOSTER VACCINE  Completed                  CMS Preventative Services Quick Reference  Risk Factors Identified During Encounter:    Fall Risk-High or Moderate: pt declined PT referral  Immunizations Discussed/Encouraged: COVID19  Inactivity/Sedentary: Patient was advised to exercise at least 150 minutes a week per CDC recommendations.  Dental Screening Recommended  Vision Screening Recommended    The above risks/problems have been discussed with the patient.  Pertinent information has been shared with the patient in the After Visit Summary.    Follow Up:   Next Medicare Wellness visit to be scheduled in 1 year.      An After Visit Summary and PPPS were made available to the patient.      A problem based visit also took place today:    1. Controlled type 2 diabetes mellitus with microalbuminuria, with long-term current use of insulin (Primary)  -A1c trends on file for this patient:   A1C Last 3 Results    HGBA1C Last 3 Results 6/27/22 12/29/22   Hemoglobin A1C 6.7 (A) 7.00 (A)   (A) Abnormal value       Comments are available for some flowsheets but are not being displayed.           -Goal A1c for this patient is less than 7.0%  -Current diabetes regimen:   diagnosed in late 1980s. Currently taking metformin 1000 mg twice daily, Novolin 70/30 40 units in the morning and 38  units in the evening with dinner as well as Jardiance 25 mg daily. Doesn't check blood sugar at home and denies any episodes of low blood sugar. He does have a blood glucose machine at home.   -Changes made to diabetes regimen today: recheck A1c today. Previously has had decent control with an A1c of 7.0. Further plan depending on results.            Patient's annual Complete Physical Exam was also completed on this date:   -Updated and reviewed past medical, family, social and surgical histories as well as allergies. Addressed care gaps listed in the medical record. Reviewed and updated medication list.   -Encouraged minimum of 30 minutes or more of exercise at a brisk walk or higher 5 days per week.  -Immunizations reviewed and updated in EMR.  -Physical exam findings documented above  Other Pertinent Preventative Topics Reviewed:   -Lipid screening:  Patient is already on statin therapy.   -Aspirin for primary or secondary prevention: patient is already on aspirin therapy   -Diabetes screening:  Patient already has a diagnosis of diabetes mellitus and is being treated.   -Abdominal aortic aneurysm screening: screening complete and negative  -Hypertension screening: Patient with known diagnosis of hypertension and is receiving treatment.  -Hepatitis C virus screening:  Patient has already completed Hepatitis C screening. Negative screening on file.   -Colon cancer screening: pt with history of tubular adenoma, last colonoscopy 2018. Will place screening colonoscopy order.   -Lung cancer screening: Patient has smoked but does not meet other eligibility criteria for screening.  -Prostate cancer screening: age greater than 70

## 2023-04-20 ENCOUNTER — PREP FOR SURGERY (OUTPATIENT)
Dept: OTHER | Facility: HOSPITAL | Age: 73
End: 2023-04-20
Payer: MEDICARE

## 2023-04-20 DIAGNOSIS — Z86.010 HISTORY OF COLON POLYPS: Primary | ICD-10-CM

## 2023-05-30 RX ORDER — LISINOPRIL 40 MG/1
TABLET ORAL
Qty: 180 TABLET | Refills: 1 | Status: SHIPPED | OUTPATIENT
Start: 2023-05-30

## 2023-08-15 ENCOUNTER — OFFICE VISIT (OUTPATIENT)
Dept: CARDIOLOGY | Facility: CLINIC | Age: 73
End: 2023-08-15
Payer: MEDICARE

## 2023-08-15 ENCOUNTER — TRANSCRIBE ORDERS (OUTPATIENT)
Dept: ADMINISTRATIVE | Facility: HOSPITAL | Age: 73
End: 2023-08-15
Payer: MEDICARE

## 2023-08-15 VITALS
DIASTOLIC BLOOD PRESSURE: 70 MMHG | WEIGHT: 219 LBS | BODY MASS INDEX: 31.35 KG/M2 | HEIGHT: 70 IN | SYSTOLIC BLOOD PRESSURE: 130 MMHG | HEART RATE: 67 BPM

## 2023-08-15 DIAGNOSIS — E78.5 HYPERLIPIDEMIA, UNSPECIFIED HYPERLIPIDEMIA TYPE: ICD-10-CM

## 2023-08-15 DIAGNOSIS — I10 PRIMARY HYPERTENSION: ICD-10-CM

## 2023-08-15 DIAGNOSIS — E11.9 CONTROLLED TYPE 2 DIABETES MELLITUS WITHOUT COMPLICATION, WITH LONG-TERM CURRENT USE OF INSULIN: ICD-10-CM

## 2023-08-15 DIAGNOSIS — I25.810 CORONARY ARTERY DISEASE INVOLVING CORONARY BYPASS GRAFT OF NATIVE HEART WITHOUT ANGINA PECTORIS: Primary | ICD-10-CM

## 2023-08-15 DIAGNOSIS — Z79.4 CONTROLLED TYPE 2 DIABETES MELLITUS WITHOUT COMPLICATION, WITH LONG-TERM CURRENT USE OF INSULIN: ICD-10-CM

## 2023-08-15 DIAGNOSIS — Z13.6 ENCOUNTER FOR SCREENING FOR VASCULAR DISEASE: Primary | ICD-10-CM

## 2023-08-15 NOTE — PROGRESS NOTES
Date of Office Visit: 08/15/2023  Encounter Provider: Sharri Garcia MD  Place of Service: [facility] CARDIOLOGY  Patient Name: Eric Liu  :1950    Chief complaint  CAD, HTN with LVH    History of Present Illness  The patient is a pleasant 73-year-old gentleman with diabetes, hypertension, hyperlipidemia, who has a history of coronary artery bypass grafting in  with prior inferior wall infarction. He has a normal systolic function. He underwent coronary artery bypass grafting with a LIMA graft to the LAD, vein graft to the RV branch, as well as a vein graft to the distal PDA and second obtuse marginal branch, circumflex artery and third obtuse marginal branch. He had postoperative fluid retention that resolved with diuretics. He has had intermittent chest pain since then and has had a couple of stress tests since then. His last stress test in 2013 revealed a small inferior wall infarction with minimal amount of richard-infarct ischemia. His ejection fraction was felt to be possibly reduced at 45%. It was unchanged from his prior study a year earlier. However, he had an echocardiogram that revealed normal left ventricular size and function with hypokinesis of the inferior wall with a 61% ejection fraction. There was grade I diastolic dysfunction, moderate left ventricular hypertrophy, no significant valvular dysfunction. He was treated medically.  2016 he was seen for worsening shortness of breath and fatigue a stress echocardiogram revealed no ischemia at a good workload with no significant valvular dysfunction and with normal systolic function.  A follow-up treadmill exercise stress test on 2021 that was negative for ischemia at 8.5 METS.  He does not have resting hypertension.    Since last visit he said no chest pain palpitations syncope or near syncope.  He is walking consistently.  Had a colonoscopy in 2023 that demonstrated 5 benign polyps.      Past Medical  History:   Diagnosis Date    Atherosclerotic heart disease of native coronary artery without angina pectoris     CAD (coronary atherosclerotic disease)     Chest pain     Colon polyp     Colonic mass     Diabetic retinopathy     History of transfusion     Hyperlipidemia     Hyperplasia of prostate without urinary obstruction     Hypertension     Mass of hepatic flexure of colon     Myocardial infarction     Retinal hemorrhage     Subclinical hypothyroidism     Type 2 diabetes mellitus      Past Surgical History:   Procedure Laterality Date    CARDIAC CATHETERIZATION Left 01/27/2011    Left heart catheterization, coronary angiogarphy, left ventriculography-Dr. Sánchez Casanova    CARPAL TUNNEL RELEASE Left 09/06/2019    Procedure: DECOMPRESSION OF CARPAL TUNNEL LEFT HAND;  Surgeon: Sánchez Malone MD;  Location: [facility] OR OSC;  Service: Plastics    CATARACT EXTRACTION Bilateral     COLON RESECTION Right 06/01/2017    Procedure: LAPAROSCOPIC RIGHT COLON RESECTION;  Surgeon: Pollo Liao MD;  Location: [facility] OR;  Service:     COLONOSCOPY N/A 01/31/2005    Colonic diverticulosis, internal hemorrhoids, and a few small colon polyps; Dr. Mushtaq Booth    COLONOSCOPY N/A 01/24/2017    Procedure: COLONOSCOPY TO CECUM AND TERMINAL ILEUM WITH COLD BIOPSY AND HOT SNARE POLYPECTOMIES, INJECTED WITH NORMAL SALINE 3ML, INJECTED WITH SPOT INK 5ML, AND BIOPSIES;  Surgeon: Mushtaq Booth MD;  Location: [facility] ENDOSCOPY;  Service:     COLONOSCOPY N/A 04/25/2017    Procedure: COLONOSCOPY to cecum and terminal ileum with cold polypectomy, and biopsies and tattoo (5cc) to mass at 75cm ;  Surgeon: Mushtaq Booth MD;  Location: [facility] ENDOSCOPY;  Service:     COLONOSCOPY N/A 06/06/2018    Procedure: COLONOSCOPY TO ANASTOMOSIS AND INTO TERMINAL ILEUM;  Surgeon: Pollo Liao MD;  Location: [facility] ENDOSCOPY;  Service: Gastroenterology    COLONOSCOPY N/A 7/7/2023    Procedure: COLONOSCOPY TO ANASTOMOSIS WITH COLD POLYPECTOMIES;   "Surgeon: Robert Dominguez Jr., MD;  Location: [facility] ENDOSCOPY;  Service: General;  Laterality: N/A;  PRE OP - PERS H/O OF POLYPS WITH COLON RESECTION OF A POLYP  POST OP - COLON POLYPS, DIVERTICULOSIS    CORONARY ARTERY BYPASS GRAFT N/A 02/08/2011    Transesophageal echo, endoscopic vein harvest, coronary artery bypass graft x5, left internal mammary graft to the LAD, vein graft to RV branch, vein graft ot distal posterior descending artery, second marginal branch of the circumflex, third marginal branch of the circumflex, temporary cardiopulmonary bypass, antegrade and retrograde cold blood cardioplegia, warm reperfusion-Dr. Brandon Bucio    EYE SURGERY      Laser Suite Ret Treatment Pan-Ablated Inferior Nasal Retina    SHOULDER MANIPULATION Left 09/01/2004    Manipulation under anesthesia and injection of left shoulder-Dr. INA Ceja    TONSILLECTOMY N/A      Outpatient Medications Prior to Visit   Medication Sig Dispense Refill    amLODIPine (NORVASC) 2.5 MG tablet TAKE 1 TABLET EVERY DAY (Patient taking differently: Take 1 tablet by mouth Every Morning.) 90 tablet 2    amLODIPine (NORVASC) 5 MG tablet TAKE 1 TABLET EVERY EVENING 90 tablet 2    aspirin 325 MG tablet Take 1 tablet by mouth Daily. 90 tablet 3    atorvastatin (LIPITOR) 40 MG tablet Take 1 tablet by mouth Daily. 90 tablet 1    carvedilol (COREG) 25 MG tablet TAKE 1 TABLET TWICE DAILY 180 tablet 1    empagliflozin (Jardiance) 25 MG tablet tablet Take 1 tablet by mouth Daily. 90 tablet 1    Insulin Syringe-Needle U-100 (Droplet Insulin Syringe) 30G X 1/2\" 0.5 ML misc Use one syringe in the morning and one syringe in the evening to inject insulin as prescribed. 200 each 3    levothyroxine (Synthroid) 112 MCG tablet Take 1 tablet by mouth Daily. 10 tablet 0    lisinopril (PRINIVIL,ZESTRIL) 40 MG tablet TAKE 1 TABLET TWICE DAILY 180 tablet 1    metFORMIN (GLUCOPHAGE) 1000 MG tablet Take 1 tablet by mouth 2 (Two) Times a Day With Meals. 180 tablet " 1    Omega-3 Fatty Acids (fish oil) 1000 MG capsule capsule Take 1 capsule by mouth 2 (Two) Times a Day With Meals. 180 capsule 1    insulin NPH-insulin regular (NovoLIN 70/30) (70-30) 100 UNIT/ML injection Inject 30 units in the morning 15 minutes before or after breakfast and 38 units in the evening 15 minutes before or after dinner. 70 mL 1     No facility-administered medications prior to visit.       Allergies as of 08/15/2023    (No Known Allergies)     Social History     Socioeconomic History    Marital status:    Tobacco Use    Smoking status: Former     Packs/day: 1.00     Years: 20.00     Pack years: 20.00     Types: Cigarettes     Start date: 1967     Quit date: 3/8/1986     Years since quittin.5    Smokeless tobacco: Never   Vaping Use    Vaping Use: Never used   Substance and Sexual Activity    Alcohol use: Yes     Alcohol/week: 2.0 standard drinks     Types: 2 Cans of beer per week    Drug use: Never     Family History   Problem Relation Age of Onset    Pancreatitis Mother     Kidney failure Mother     Heart attack Father     Heart disease Father     No Known Problems Brother     No Known Problems Brother     Hypertension Other     Malig Hyperthermia Neg Hx      Review of Systems   Constitutional: Negative for chills, fever, weight gain and weight loss.   Cardiovascular:  Negative for leg swelling.   Respiratory:  Negative for cough, snoring and wheezing.    Hematologic/Lymphatic: Negative for bleeding problem. Does not bruise/bleed easily.   Skin:  Negative for color change.   Musculoskeletal:  Negative for falls, joint pain and myalgias.   Gastrointestinal:  Negative for melena.   Genitourinary:  Negative for hematuria.   Neurological:  Negative for excessive daytime sleepiness.   Psychiatric/Behavioral:  Negative for depression. The patient is not nervous/anxious.       Objective:     Vitals:    08/15/23 0856   BP: 130/70   Pulse: 67   Weight: 99.3 kg (219 lb)   Height: 177.8 cm  "(70\")     Body mass index is 31.42 kg/mý.    Vitals reviewed.   Constitutional:       Appearance: Well-developed. Obese.   Eyes:      General: No scleral icterus.        Right eye: No discharge.      Conjunctiva/sclera: Conjunctivae normal.      Pupils: Pupils are equal, round, and reactive to light.   HENT:      Head: Normocephalic.      Nose: Nose normal.   Neck:      Thyroid: No thyromegaly.      Vascular: No JVD.   Pulmonary:      Effort: Pulmonary effort is normal. No respiratory distress.      Breath sounds: Normal breath sounds. No wheezing. No rales.   Cardiovascular:      Normal rate. Regular rhythm. Normal S1. Normal S2.       Murmurs: There is no murmur.      No gallop.    Pulses:     Intact distal pulses.      Carotid: 2+ bilaterally.     Radial: 2+ bilaterally.     Femoral: 2+ bilaterally.     Popliteal: 2+ bilaterally.     Dorsalis pedis: 2+ bilaterally.     Posterior tibial: 2+ bilaterally.  Edema:     Peripheral edema absent.   Abdominal:      General: Bowel sounds are normal. There is no distension.      Palpations: Abdomen is soft.      Tenderness: There is no abdominal tenderness. There is no rebound.   Musculoskeletal: Normal range of motion.         General: No tenderness.      Cervical back: Normal range of motion and neck supple. Skin:     General: Skin is warm and dry.      Findings: No erythema or rash.   Neurological:      Mental Status: Alert and oriented to person, place, and time.   Psychiatric:         Behavior: Behavior normal.         Thought Content: Thought content normal.         Judgment: Judgment normal.     Lab Review:   Lab Results - Last 18 Months   Lab Units 07/12/23  1534 06/27/22  0834   WBC 10*3/mm3 11.60* 10.1   RBC 10*6/mm3 5.38 5.54   HEMOGLOBIN g/dL 14.8 15.4   HEMATOCRIT % 46.1 48.2   MCV fL 85.7 87   MCH pg 27.5 27.8   MCHC g/dL 32.1 32.0   RDW % 14.5 14.2   PLATELETS 10*3/mm3 251 259   NEUTROPHIL % % 70.8 64   LYMPHOCYTE % % 17.0* 24   MONOCYTES % % 10.1 8 "   EOSINOPHIL % % 1.4 3   BASOPHIL % % 0.4 1   NEUTROS ABS 10*3/mm3 8.21* 6.5   LYMPHS ABS 10*3/mm3 1.97 2.4   MONOS ABS 10*3/mm3 1.17* 0.9   EOS ABS 10*3/mm3 0.16 0.3   BASOS ABS 10*3/mm3 0.05 0.1   IMM GRAN % % 0.3 0   IMMATURE GRANS (ABS) 10*3/mm3 0.04 0.0       Lab Results - Last 18 Months   Lab Units 07/20/23  0927 07/12/23  1534 12/29/22  0945 06/27/22  0834   GLUCOSE mg/dL 215* 203*   < > 108*   BUN mg/dL 16 22   < > 16   CREATININE mg/dL 1.23 1.45*   < > 1.08   SODIUM mmol/L 141 142   < > 143   POTASSIUM mmol/L 4.2 4.7   < > 3.9   CHLORIDE mmol/L 102 103   < > 101   CO2 mmol/L 25.3 25.2   < > 27   CALCIUM mg/dL 9.2 9.4   < > 9.0   ALBUMIN g/dL  --  4.4  --  4.5   ALT (SGPT) U/L  --  23  --  18   AST (SGOT) U/L  --  30  --  22   ALK PHOS U/L  --  79  --  86   BILIRUBIN mg/dL  --  0.6  --  0.3   BUN / CREAT RATIO  13.0 15.2   < > 15    < > = values in this interval not displayed.     Lab Results - Last 18 Months   Lab Units 07/12/23  1534 06/27/22  0834   TRIGLYCERIDES mg/dL 151* 119   HDL CHOL mg/dL 41 44   LDL CHOL mg/dL 52 73   VLDL CHOLESTEROL TUYET mg/dL 26 21   LDL/HDL RATIO  1.17  --      Lab Results - Last 18 Months   Lab Units 07/12/23  1534 06/27/22  0834   TSH uIU/mL 2.310 2.370         Female here for recheck repeatedly    Date/Time: 8/15/2023 9:15 AM  Performed by: Sharri Garcia MD  Authorized by: Sharri Garcia MD   Comparison: compared with previous ECG   Rhythm: sinus rhythm  Conduction: right bundle branch block and 1st degree AV block    Clinical impression: abnormal EKG      Assessment:       Diagnosis Plan   1. Coronary artery disease involving coronary bypass graft of native heart without angina pectoris        2. Primary hypertension        3. Hyperlipidemia, unspecified hyperlipidemia type        4. Controlled type 2 diabetes mellitus without complication, with long-term current use of insulin          Plan:       1.  Coronary artery disease with history of prior myocardial infarction and CABG  "2011. Negative stress test 9/2021.  No anginal symptoms  2.  Carotid artery disease, negative carotid Doppler and March 2019.   3.  Hypertension.  Appears controlled  4.  Hyperlipidemia.  Carbohydrate diet  5.  Diabetes  6.  Hypothyroidism.  7.  Chronic right bundle branch block  8.  Vascular screening.  Recommended he repeat this as its been several years since last              Your medication list            Accurate as of August 15, 2023 11:59 PM. If you have any questions, ask your nurse or doctor.                CHANGE how you take these medications        Instructions Last Dose Given Next Dose Due   amLODIPine 5 MG tablet  Commonly known as: NORVASC  What changed: Another medication with the same name was changed. Make sure you understand how and when to take each.      TAKE 1 TABLET EVERY EVENING       amLODIPine 2.5 MG tablet  Commonly known as: NORVASC  What changed: when to take this      TAKE 1 TABLET EVERY DAY              CONTINUE taking these medications        Instructions Last Dose Given Next Dose Due   aspirin 325 MG tablet      Take 1 tablet by mouth Daily.       atorvastatin 40 MG tablet  Commonly known as: LIPITOR      Take 1 tablet by mouth Daily.       carvedilol 25 MG tablet  Commonly known as: COREG      TAKE 1 TABLET TWICE DAILY       Droplet Insulin Syringe 30G X 1/2\" 0.5 ML misc  Generic drug: Insulin Syringe-Needle U-100      Use one syringe in the morning and one syringe in the evening to inject insulin as prescribed.       empagliflozin 25 MG tablet tablet  Commonly known as: Jardiance      Take 1 tablet by mouth Daily.       fish oil 1000 MG capsule capsule      Take 1 capsule by mouth 2 (Two) Times a Day With Meals.       levothyroxine 112 MCG tablet  Commonly known as: Synthroid      Take 1 tablet by mouth Daily.       lisinopril 40 MG tablet  Commonly known as: PRINIVIL,ZESTRIL      TAKE 1 TABLET TWICE DAILY       metFORMIN 1000 MG tablet  Commonly known as: GLUCOPHAGE      Take 1 " tablet by mouth 2 (Two) Times a Day With Meals.       NovoLIN 70/30 (70-30) 100 UNIT/ML injection  Generic drug: insulin NPH-insulin regular      Inject 30 units in the morning 15 minutes before or after breakfast and 38 units in the evening 15 minutes before or after dinner.                Patient is no longer taking -.  I corrected the med list to reflect this.  I did not stop these medications.      Dictated utilizing Dragon dictation

## 2023-08-28 RX ORDER — HUMAN INSULIN 100 [USP'U]/ML
INJECTION, SUSPENSION SUBCUTANEOUS
Qty: 60 ML | Refills: 1 | Status: SHIPPED | OUTPATIENT
Start: 2023-08-28

## 2023-09-05 DIAGNOSIS — Z79.4 CONTROLLED TYPE 2 DIABETES MELLITUS WITHOUT COMPLICATION, WITH LONG-TERM CURRENT USE OF INSULIN: ICD-10-CM

## 2023-09-05 DIAGNOSIS — E11.9 CONTROLLED TYPE 2 DIABETES MELLITUS WITHOUT COMPLICATION, WITH LONG-TERM CURRENT USE OF INSULIN: ICD-10-CM

## 2023-09-13 RX ORDER — LEVOTHYROXINE SODIUM 112 UG/1
TABLET ORAL
Qty: 90 TABLET | Refills: 0 | Status: SHIPPED | OUTPATIENT
Start: 2023-09-13

## 2023-10-30 RX ORDER — AMLODIPINE BESYLATE 5 MG/1
TABLET ORAL
Qty: 90 TABLET | Refills: 2 | Status: SHIPPED | OUTPATIENT
Start: 2023-10-30

## 2023-10-30 RX ORDER — AMLODIPINE BESYLATE 2.5 MG/1
TABLET ORAL
Qty: 90 TABLET | Refills: 2 | Status: SHIPPED | OUTPATIENT
Start: 2023-10-30

## 2023-12-06 DIAGNOSIS — E11.9 CONTROLLED TYPE 2 DIABETES MELLITUS WITHOUT COMPLICATION, WITH LONG-TERM CURRENT USE OF INSULIN: ICD-10-CM

## 2023-12-06 DIAGNOSIS — Z79.4 CONTROLLED TYPE 2 DIABETES MELLITUS WITHOUT COMPLICATION, WITH LONG-TERM CURRENT USE OF INSULIN: ICD-10-CM

## 2023-12-07 RX ORDER — ATORVASTATIN CALCIUM 40 MG/1
40 TABLET, FILM COATED ORAL DAILY
Qty: 90 TABLET | Refills: 3 | Status: SHIPPED | OUTPATIENT
Start: 2023-12-07

## 2023-12-18 ENCOUNTER — HOSPITAL ENCOUNTER (OUTPATIENT)
Dept: CARDIOLOGY | Facility: HOSPITAL | Age: 73
Discharge: HOME | End: 2023-12-18
Admitting: INTERNAL MEDICINE

## 2023-12-18 ENCOUNTER — TELEPHONE (OUTPATIENT)
Dept: CARDIOLOGY | Facility: CLINIC | Age: 73
End: 2023-12-18
Payer: MEDICARE

## 2023-12-18 VITALS
DIASTOLIC BLOOD PRESSURE: 74 MMHG | WEIGHT: 219 LBS | BODY MASS INDEX: 30.66 KG/M2 | HEIGHT: 71 IN | HEART RATE: 62 BPM | SYSTOLIC BLOOD PRESSURE: 157 MMHG

## 2023-12-18 DIAGNOSIS — Z13.6 ENCOUNTER FOR SCREENING FOR VASCULAR DISEASE: ICD-10-CM

## 2023-12-18 DIAGNOSIS — R68.89 ABNORMAL ANKLE BRACHIAL INDEX (ABI): Primary | ICD-10-CM

## 2023-12-18 LAB
BH CV ECHO MEAS - DIST AO DIAM: 1.37 CM
BH CV VAS BP LEFT ARM: NORMAL MMHG
BH CV VAS BP RIGHT ARM: NORMAL MMHG
BH CV VAS SCREENING CAROTID CCA LEFT: NORMAL CM/SEC
BH CV VAS SCREENING CAROTID CCA RIGHT: NORMAL CM/SEC
BH CV VAS SCREENING CAROTID ICA LEFT: NORMAL CM/SEC
BH CV VAS SCREENING CAROTID ICA RIGHT: NORMAL CM/SEC
BH CV XLRA MEAS - MID AO DIAM: 1.59 CM
BH CV XLRA MEAS - PAD LEFT ABI DP: NORMAL
BH CV XLRA MEAS - PAD LEFT ABI PT: NORMAL
BH CV XLRA MEAS - PAD LEFT ARM: 157 MMHG
BH CV XLRA MEAS - PAD LEFT LEG DP: NORMAL MMHG
BH CV XLRA MEAS - PAD LEFT LEG PT: NORMAL MMHG
BH CV XLRA MEAS - PAD RIGHT ABI DP: NORMAL
BH CV XLRA MEAS - PAD RIGHT ABI PT: NORMAL
BH CV XLRA MEAS - PAD RIGHT ARM: 156 MMHG
BH CV XLRA MEAS - PAD RIGHT LEG DP: NORMAL MMHG
BH CV XLRA MEAS - PAD RIGHT LEG PT: NORMAL MMHG
BH CV XLRA MEAS - PROX AO DIAM: 1.78 CM
BH CV XLRA MEAS LEFT ICA/CCA RATIO: 1.41
BH CV XLRA MEAS LEFT PROX ECA PSV: NORMAL CM/SEC
BH CV XLRA MEAS RIGHT ICA/CCA RATIO: 1.32
BH CV XLRA MEAS RIGHT PROX ECA PSV: NORMAL CM/SEC
MAXIMAL PREDICTED HEART RATE: 146 BPM
STRESS TARGET HR: 124 BPM

## 2023-12-18 PROCEDURE — 93799 UNLISTED CV SVC/PROCEDURE: CPT

## 2023-12-18 NOTE — TELEPHONE ENCOUNTER
Reviewed recommendations with patient, verbalized understanding, will call with any further questions or complaints.    Edelmira Williamson RN  Triage Nurse  12/18/23 13:56 EST

## 2023-12-18 NOTE — TELEPHONE ENCOUNTER
Please let him know that vascular screening study shows carotid arteries with plaque bilaterally but no significant stenosis or narrowing of either carotid artery.  Abdominal aorta was normal, but peripheral arteries are calcified and noncompressible.  Would recommend follow-up with vascular surgery to evaluate this further.  Please let me know if he is agreeable to this and I will place the order.

## 2023-12-18 NOTE — TELEPHONE ENCOUNTER
Results and recommendations called to pt.  Instructed to call with any further questions or concerns.  Verbalized understanding.    Karen- pt is willing to see a vascular surgeon, but he has [employer] insurance, and is requesting that you refer him to one that is not part of the [employer] system so that he will have insurance coverage.    Edelmira Williamson RN  Triage Nurse, [facility]  12/18/23 13:45 EST

## 2024-02-01 DIAGNOSIS — I25.10 CORONARY ARTERIOSCLEROSIS IN NATIVE ARTERY: ICD-10-CM

## 2024-02-01 DIAGNOSIS — Z79.4 CONTROLLED TYPE 2 DIABETES MELLITUS WITHOUT COMPLICATION, WITH LONG-TERM CURRENT USE OF INSULIN: ICD-10-CM

## 2024-02-01 DIAGNOSIS — E11.9 CONTROLLED TYPE 2 DIABETES MELLITUS WITHOUT COMPLICATION, WITH LONG-TERM CURRENT USE OF INSULIN: ICD-10-CM

## 2024-02-01 RX ORDER — EMPAGLIFLOZIN 25 MG/1
25 TABLET, FILM COATED ORAL DAILY
Qty: 90 TABLET | Refills: 3 | OUTPATIENT
Start: 2024-02-01

## 2024-02-20 RX ORDER — LISINOPRIL 40 MG/1
TABLET ORAL
Qty: 180 TABLET | Refills: 0 | Status: SHIPPED | OUTPATIENT
Start: 2024-02-20

## 2024-03-06 RX ORDER — LEVOTHYROXINE SODIUM 112 UG/1
TABLET ORAL
Qty: 90 TABLET | Refills: 3 | OUTPATIENT
Start: 2024-03-06

## 2024-05-20 RX ORDER — LISINOPRIL 40 MG/1
TABLET ORAL
Qty: 180 TABLET | Refills: 3 | Status: SHIPPED | OUTPATIENT
Start: 2024-05-20

## 2024-05-20 RX ORDER — HUMAN INSULIN 100 [USP'U]/ML
INJECTION, SUSPENSION SUBCUTANEOUS
Qty: 60 ML | Refills: 3 | OUTPATIENT
Start: 2024-05-20

## 2024-06-05 DIAGNOSIS — E11.9 CONTROLLED TYPE 2 DIABETES MELLITUS WITHOUT COMPLICATION, WITH LONG-TERM CURRENT USE OF INSULIN: ICD-10-CM

## 2024-06-05 DIAGNOSIS — Z79.4 CONTROLLED TYPE 2 DIABETES MELLITUS WITHOUT COMPLICATION, WITH LONG-TERM CURRENT USE OF INSULIN: ICD-10-CM

## 2024-08-05 ENCOUNTER — TELEPHONE (OUTPATIENT)
Dept: CARDIOLOGY | Facility: CLINIC | Age: 74
End: 2024-08-05
Payer: MEDICARE

## 2024-08-05 RX ORDER — AMLODIPINE BESYLATE 5 MG/1
TABLET ORAL
Qty: 90 TABLET | Refills: 3 | OUTPATIENT
Start: 2024-08-05

## 2024-08-05 RX ORDER — AMLODIPINE BESYLATE 2.5 MG/1
2.5 TABLET ORAL DAILY
Qty: 90 TABLET | Refills: 2 | Status: SHIPPED | OUTPATIENT
Start: 2024-08-05

## 2024-08-05 RX ORDER — AMLODIPINE BESYLATE 5 MG/1
5 TABLET ORAL EVERY EVENING
Qty: 90 TABLET | Refills: 2 | Status: SHIPPED | OUTPATIENT
Start: 2024-08-05

## 2024-08-05 RX ORDER — AMLODIPINE BESYLATE 2.5 MG/1
TABLET ORAL
Qty: 90 TABLET | Refills: 3 | OUTPATIENT
Start: 2024-08-05

## 2024-08-05 NOTE — TELEPHONE ENCOUNTER
Pt came in today for b/p check, pt will call in a week and give b/p checks 2 x a day morning and afternoon. Pt will take b/p 2 hours after taking medication sit for 45 minutes and then take b/p. MA will call in a week if no heard from pt.

## 2024-08-05 NOTE — TELEPHONE ENCOUNTER
Lv 8/15/23 MKD  Nxt 9/3/24 MKD  Labs 7/12/23 labs recently completed through [facility] reached out to get recent labs Dr. Diaz Gray     Return for followup with APRN in 6 months and me in 1 yr.     Pt canceled appt in Feb due to insurance not being taken, will keep 9/3/24 appt.

## 2024-09-03 ENCOUNTER — OFFICE VISIT (OUTPATIENT)
Dept: CARDIOLOGY | Facility: CLINIC | Age: 74
End: 2024-09-03
Payer: MEDICARE

## 2024-09-03 ENCOUNTER — TELEPHONE (OUTPATIENT)
Dept: CARDIOLOGY | Facility: CLINIC | Age: 74
End: 2024-09-03

## 2024-09-03 VITALS
HEART RATE: 61 BPM | DIASTOLIC BLOOD PRESSURE: 72 MMHG | HEIGHT: 70 IN | WEIGHT: 219 LBS | BODY MASS INDEX: 31.35 KG/M2 | SYSTOLIC BLOOD PRESSURE: 148 MMHG

## 2024-09-03 DIAGNOSIS — I25.810 CORONARY ARTERY DISEASE INVOLVING CORONARY BYPASS GRAFT OF NATIVE HEART WITHOUT ANGINA PECTORIS: ICD-10-CM

## 2024-09-03 DIAGNOSIS — I25.810 CORONARY ARTERY DISEASE INVOLVING CORONARY BYPASS GRAFT OF NATIVE HEART, UNSPECIFIED WHETHER ANGINA PRESENT: Primary | ICD-10-CM

## 2024-09-03 DIAGNOSIS — E78.5 HYPERLIPIDEMIA, UNSPECIFIED HYPERLIPIDEMIA TYPE: ICD-10-CM

## 2024-09-03 DIAGNOSIS — R06.09 DYSPNEA ON EXERTION: ICD-10-CM

## 2024-09-03 DIAGNOSIS — I10 PRIMARY HYPERTENSION: ICD-10-CM

## 2024-09-03 PROCEDURE — 99214 OFFICE O/P EST MOD 30 MIN: CPT | Performed by: INTERNAL MEDICINE

## 2024-09-03 PROCEDURE — 1159F MED LIST DOCD IN RCRD: CPT | Performed by: INTERNAL MEDICINE

## 2024-09-03 PROCEDURE — 3078F DIAST BP <80 MM HG: CPT | Performed by: INTERNAL MEDICINE

## 2024-09-03 PROCEDURE — 93000 ELECTROCARDIOGRAM COMPLETE: CPT | Performed by: INTERNAL MEDICINE

## 2024-09-03 PROCEDURE — 3077F SYST BP >= 140 MM HG: CPT | Performed by: INTERNAL MEDICINE

## 2024-09-03 PROCEDURE — 1160F RVW MEDS BY RX/DR IN RCRD: CPT | Performed by: INTERNAL MEDICINE

## 2024-09-03 RX ORDER — LEVOTHYROXINE SODIUM 125 UG/1
125 TABLET ORAL DAILY
COMMUNITY
Start: 2024-08-29

## 2024-09-03 NOTE — PROGRESS NOTES
Date of Office Visit: 2024  Encounter Provider: Sharri Garcia MD  Place of Service: [facility] CARDIOLOGY  Patient Name: Eric Liu  :1950    Chief complaint  Coronary artery disease, hypertension, hypertensive heart disease    History of Present Illness  The patient is a pleasant 74-year-old gentleman with diabetes, hypertension, hyperlipidemia, who has a history of coronary artery bypass grafting in  with prior inferior wall infarction. He has a normal systolic function. He underwent coronary artery bypass grafting with a LIMA graft to the LAD, vein graft to the RV branch, as well as a vein graft to the distal PDA and second obtuse marginal branch, circumflex artery and third obtuse marginal branch. He had postoperative fluid retention that resolved with diuretics. He has had intermittent chest pain since then and has had a couple of stress tests since then. His last stress test in 2013 revealed a small inferior wall infarction with minimal amount of richard-infarct ischemia. His ejection fraction was felt to be possibly reduced at 45%. It was unchanged from his prior study a year earlier. However, he had an echocardiogram that revealed normal left ventricular size and function with hypokinesis of the inferior wall with a 61% ejection fraction. There was grade I diastolic dysfunction, moderate left ventricular hypertrophy, no significant valvular dysfunction. He was treated medically.  2016 he was seen for worsening shortness of breath and fatigue a stress echocardiogram revealed no ischemia at a good workload with no significant valvular dysfunction and with normal systolic function.  A follow-up treadmill exercise stress test on 2021 that was negative for ischemia at 8.5 METS.  He does not have resting hypertension.  Vascular screening study on 2023 showed bilateral carotid plaque without stenosis greater than 50%.    Since last visit patient's had no  chest pain palpitation syncope near syncope.  He is walking every other day.  Blood pressure today is elevated.  Patient states dyspnea on exertion has gotten worse.  Blood pressure at home is much lower typically in the 1 teens to 120s diastolics in the 60s to 70s.  She is walking 1 to 2 miles every other day.    Past Medical History:   Diagnosis Date    Atherosclerotic heart disease of native coronary artery without angina pectoris     CAD (coronary atherosclerotic disease)     Chest pain     Colon polyp     Colonic mass     Diabetic retinopathy     History of transfusion     Hyperlipidemia     Hyperplasia of prostate without urinary obstruction     Hypertension     Mass of hepatic flexure of colon     Myocardial infarction     Retinal hemorrhage     Subclinical hypothyroidism     Type 2 diabetes mellitus      Past Surgical History:   Procedure Laterality Date    CARDIAC CATHETERIZATION Left 01/27/2011    Left heart catheterization, coronary angiogarphy, left ventriculography-Dr. Sánchez Casanova    CARPAL TUNNEL RELEASE Left 09/06/2019    Procedure: DECOMPRESSION OF CARPAL TUNNEL LEFT HAND;  Surgeon: Sánchez Malone MD;  Location: [facility] OSC;  Service: Plastics    CATARACT EXTRACTION Bilateral     COLON RESECTION Right 06/01/2017    Procedure: LAPAROSCOPIC RIGHT COLON RESECTION;  Surgeon: Pollo Liao MD;  Location: [facility] OR;  Service:     COLONOSCOPY N/A 01/31/2005    Colonic diverticulosis, internal hemorrhoids, and a few small colon polyps; Dr. Mushtaq Booth    COLONOSCOPY N/A 01/24/2017    Procedure: COLONOSCOPY TO CECUM AND TERMINAL ILEUM WITH COLD BIOPSY AND HOT SNARE POLYPECTOMIES, INJECTED WITH NORMAL SALINE 3ML, INJECTED WITH SPOT INK 5ML, AND BIOPSIES;  Surgeon: Mushtaq Booth MD;  Location: [facility] ENDOSCOPY;  Service:     COLONOSCOPY N/A 04/25/2017    Procedure: COLONOSCOPY to cecum and terminal ileum with cold polypectomy, and biopsies and tattoo (5cc) to mass at 75cm ;  Surgeon: Mushtaq Booth  MD;  Location: [facility] ENDOSCOPY;  Service:     COLONOSCOPY N/A 06/06/2018    Procedure: COLONOSCOPY TO ANASTOMOSIS AND INTO TERMINAL ILEUM;  Surgeon: Pollo Liao MD;  Location: [facility] ENDOSCOPY;  Service: Gastroenterology    COLONOSCOPY N/A 7/7/2023    Procedure: COLONOSCOPY TO ANASTOMOSIS WITH COLD POLYPECTOMIES;  Surgeon: Robert Dominguez Jr., MD;  Location: [facility] ENDOSCOPY;  Service: General;  Laterality: N/A;  PRE OP - PERS H/O OF POLYPS WITH COLON RESECTION OF A POLYP  POST OP - COLON POLYPS, DIVERTICULOSIS    CORONARY ARTERY BYPASS GRAFT N/A 02/08/2011    Transesophageal echo, endoscopic vein harvest, coronary artery bypass graft x5, left internal mammary graft to the LAD, vein graft to RV branch, vein graft ot distal posterior descending artery, second marginal branch of the circumflex, third marginal branch of the circumflex, temporary cardiopulmonary bypass, antegrade and retrograde cold blood cardioplegia, warm reperfusion-Dr. Brandon Bucio    EYE SURGERY      Laser Suite Ret Treatment Pan-Ablated Inferior Nasal Retina    SHOULDER MANIPULATION Left 09/01/2004    Manipulation under anesthesia and injection of left shoulder-Dr. INA Ceja    TONSILLECTOMY N/A      Outpatient Medications Prior to Visit   Medication Sig Dispense Refill    amLODIPine (NORVASC) 2.5 MG tablet Take 1 tablet by mouth Daily. 90 tablet 2    amLODIPine (NORVASC) 5 MG tablet Take 1 tablet by mouth Every Evening. 90 tablet 2    aspirin 325 MG tablet Take 1 tablet by mouth Daily. 90 tablet 3    atorvastatin (LIPITOR) 40 MG tablet TAKE 1 TABLET EVERY DAY 90 tablet 3    carvedilol (COREG) 25 MG tablet TAKE 1 TABLET TWICE DAILY 180 tablet 1    empagliflozin (Jardiance) 25 MG tablet tablet Take 1 tablet by mouth Daily. 90 tablet 1    insulin NPH-insulin regular (NovoLIN 70/30) (70-30) 100 UNIT/ML injection INJECT 30 UNITS IN MORNING 15 MINUTES BEFORE OR AFTER BREAKFAST AND 38 UNITS IN EVENING 15 MINUTES BEFORE OR AFTER DINNER  "60 mL 1    Insulin Syringe-Needle U-100 (Droplet Insulin Syringe) 30G X 1/2\" 0.5 ML misc Use one syringe in the morning and one syringe in the evening to inject insulin as prescribed. 200 each 3    levothyroxine (SYNTHROID, LEVOTHROID) 125 MCG tablet Take 1 tablet by mouth Daily.      lisinopril (PRINIVIL,ZESTRIL) 40 MG tablet TAKE 1 TABLET TWICE DAILY (REFILLS FROM NEW CARDIOLOGIST) 180 tablet 3    metFORMIN (GLUCOPHAGE) 1000 MG tablet TAKE 1 TABLET TWICE DAILY WITH MEALS 180 tablet 1    Omega-3 Fatty Acids (fish oil) 1000 MG capsule capsule Take 1 capsule by mouth 2 (Two) Times a Day With Meals. 180 capsule 1    levothyroxine (SYNTHROID, LEVOTHROID) 112 MCG tablet TAKE 1 TABLET EVERY DAY (Patient not taking: Reported on 9/3/2024) 90 tablet 0     No facility-administered medications prior to visit.       Allergies as of 2024    (No Known Allergies)     Social History     Socioeconomic History    Marital status:    Tobacco Use    Smoking status: Former     Current packs/day: 0.00     Average packs/day: 1 pack/day for 20.0 years (20.0 ttl pk-yrs)     Types: Cigarettes     Start date: 1967     Quit date: 3/8/1986     Years since quittin.5    Smokeless tobacco: Never   Vaping Use    Vaping status: Never Used   Substance and Sexual Activity    Alcohol use: Yes     Alcohol/week: 2.0 standard drinks of alcohol     Types: 2 Cans of beer per week    Drug use: Never     Family History   Problem Relation Age of Onset    Pancreatitis Mother     Kidney failure Mother     Heart attack Father     Heart disease Father     No Known Problems Brother     No Known Problems Brother     Hypertension Other     Malig Hyperthermia Neg Hx      Review of Systems   Constitutional: Negative for chills, fever, weight gain and weight loss.   Cardiovascular:  Negative for leg swelling.   Respiratory:  Negative for cough, snoring and wheezing.    Hematologic/Lymphatic: Negative for bleeding problem. Does not bruise/bleed " "easily.   Skin:  Negative for color change.   Musculoskeletal:  Negative for falls, joint pain and myalgias.   Gastrointestinal:  Negative for melena.   Genitourinary:  Negative for hematuria.   Neurological:  Negative for excessive daytime sleepiness.   Psychiatric/Behavioral:  Negative for depression. The patient is not nervous/anxious.         Objective:     Vitals:    09/03/24 1110   BP: 148/72   Pulse: 61   Weight: 99.3 kg (219 lb)   Height: 177.8 cm (70\")     Body mass index is 31.42 kg/m².    Vitals reviewed.   Constitutional:       Appearance: Well-developed.   Eyes:      General: No scleral icterus.        Right eye: No discharge.      Conjunctiva/sclera: Conjunctivae normal.      Pupils: Pupils are equal, round, and reactive to light.   HENT:      Head: Normocephalic.      Nose: Nose normal.   Neck:      Thyroid: No thyromegaly.      Vascular: No JVD.   Pulmonary:      Effort: Pulmonary effort is normal. No respiratory distress.      Breath sounds: Normal breath sounds. No wheezing. No rales.   Cardiovascular:      Normal rate. Regular rhythm. Normal S1. Normal S2.       Murmurs: There is no murmur.      No gallop.    Pulses:     Intact distal pulses.      Carotid: 2+ bilaterally.     Radial: 2+ bilaterally.     Femoral: 2+ bilaterally.     Popliteal: 2+ bilaterally.     Dorsalis pedis: 2+ bilaterally.     Posterior tibial: 2+ bilaterally.  Edema:     Peripheral edema absent.   Abdominal:      General: Bowel sounds are normal. There is no distension.      Palpations: Abdomen is soft.      Tenderness: There is no abdominal tenderness. There is no rebound.   Musculoskeletal: Normal range of motion.         General: No tenderness.      Cervical back: Normal range of motion and neck supple. Skin:     General: Skin is warm and dry.      Findings: No erythema or rash.   Neurological:      Mental Status: Alert and oriented to person, place, and time.   Psychiatric:         Behavior: Behavior normal.         " Thought Content: Thought content normal.         Judgment: Judgment normal.       Lab Review:   Lab Results - Last 18 Months   Lab Units 07/12/23  1534   WBC 10*3/mm3 11.60*   RBC 10*6/mm3 5.38   HEMOGLOBIN g/dL 14.8   HEMATOCRIT % 46.1   MCV fL 85.7   MCH pg 27.5   MCHC g/dL 32.1   RDW % 14.5   PLATELETS 10*3/mm3 251   NEUTROPHIL % % 70.8   LYMPHOCYTE % % 17.0*   MONOCYTES % % 10.1   EOSINOPHIL % % 1.4   BASOPHIL % % 0.4   NEUTROS ABS 10*3/mm3 8.21*   LYMPHS ABS 10*3/mm3 1.97   MONOS ABS 10*3/mm3 1.17*   EOS ABS 10*3/mm3 0.16   BASOS ABS 10*3/mm3 0.05   IMM GRAN % % 0.3   IMMATURE GRANS (ABS) 10*3/mm3 0.04       Lab Results - Last 18 Months   Lab Units 07/20/23  0927 07/12/23  1534   GLUCOSE mg/dL 215* 203*   BUN mg/dL 16 22   CREATININE mg/dL 1.23 1.45*   SODIUM mmol/L 141 142   POTASSIUM mmol/L 4.2 4.7   CHLORIDE mmol/L 102 103   CO2 mmol/L 25.3 25.2   CALCIUM mg/dL 9.2 9.4   ALBUMIN g/dL  --  4.4   ALT (SGPT) U/L  --  23   AST (SGOT) U/L  --  30   ALK PHOS U/L  --  79   BILIRUBIN mg/dL  --  0.6   BUN / CREAT RATIO  13.0 15.2     Lab Results - Last 18 Months   Lab Units 07/12/23  1534   TRIGLYCERIDES mg/dL 151*   HDL CHOL mg/dL 41   LDL CHOL mg/dL 52   VLDL CHOLESTEROL TUYET mg/dL 26   LDL/HDL RATIO  1.17     Lab Results - Last 18 Months   Lab Units 07/12/23  1534   TSH uIU/mL 2.310         ECG 12 Lead    Date/Time: 9/3/2024 11:23 AM  Performed by: Sharri Garcia MD    Authorized by: Sharri Garcia MD  Comparison: compared with previous ECG   Similar to previous ECG  Rhythm: sinus rhythm  Conduction: right bundle branch block and 1st degree AV block            Diagnosis Plan   1. Coronary artery disease involving coronary bypass graft of native heart, unspecified whether angina present  ECG 12 Lead    Stress Test With Myocardial Perfusion One Day      2. Dyspnea on exertion  ECG 12 Lead    Stress Test With Myocardial Perfusion One Day    Adult Transthoracic Echo Complete W/ Cont if Necessary Per Protocol      3.  Primary hypertension        4. Hyperlipidemia, unspecified hyperlipidemia type        5. Coronary artery disease involving coronary bypass graft of native heart without angina pectoris          Plan:           1.  Coronary artery disease with history of prior myocardial infarction and CABG 2011. Negative stress test 9/2021 which was submaximal.  Given dyspnea on exertion we will check a Lexiscan cardiac stress test.  Will also check an echocardiogram.  2.  Carotid artery disease, negative carotid Doppler and March 2019.  Vascular screening study 12/2023 without significant stenosis.  3.  Hypertension.  Home readings a lot lower   4.  Hyperlipidemia. As per Dr Gray. Patient has made dietary changes  5.  Diabetes, suboptimal control. Managed by Dr Gray  6.  Hypothyroidism.  7.  Chronic right bundle branch block      Time Spent: I spent 35 minutes caring for Edward on this date of service. This time includes time spent by me in the following activities: preparing for the visit, reviewing tests, obtaining and/or reviewing a separately obtained history, performing a medically appropriate examination and/or evaluation, counseling and educating the patient/family/caregiver, ordering medications, tests, or procedures, documenting information in the medical record, and independently interpreting results and communicating that information with the patient/family/caregiver.   I spent 1 minutes on the separately reported service of ECG. This time is not included in the time used to support the E/M service also reported today.        Your medication list            Accurate as of September 3, 2024 11:59 PM. If you have any questions, ask your nurse or doctor.                CONTINUE taking these medications        Instructions Last Dose Given Next Dose Due   amLODIPine 5 MG tablet  Commonly known as: NORVASC      Take 1 tablet by mouth Every Evening.       amLODIPine 2.5 MG tablet  Commonly known as: NORVASC      Take 1 tablet  "by mouth Daily.       aspirin 325 MG tablet      Take 1 tablet by mouth Daily.       atorvastatin 40 MG tablet  Commonly known as: LIPITOR      TAKE 1 TABLET EVERY DAY       carvedilol 25 MG tablet  Commonly known as: COREG      TAKE 1 TABLET TWICE DAILY       Droplet Insulin Syringe 30G X 1/2\" 0.5 ML misc  Generic drug: Insulin Syringe-Needle U-100      Use one syringe in the morning and one syringe in the evening to inject insulin as prescribed.       empagliflozin 25 MG tablet tablet  Commonly known as: Jardiance      Take 1 tablet by mouth Daily.       fish oil 1000 MG capsule capsule      Take 1 capsule by mouth 2 (Two) Times a Day With Meals.       levothyroxine 125 MCG tablet  Commonly known as: SYNTHROID, LEVOTHROID      Take 1 tablet by mouth Daily.       lisinopril 40 MG tablet  Commonly known as: PRINIVIL,ZESTRIL      TAKE 1 TABLET TWICE DAILY (REFILLS FROM NEW CARDIOLOGIST)       metFORMIN 1000 MG tablet  Commonly known as: GLUCOPHAGE      TAKE 1 TABLET TWICE DAILY WITH MEALS       NovoLIN 70/30 (70-30) 100 UNIT/ML injection  Generic drug: insulin NPH-insulin regular      INJECT 30 UNITS IN MORNING 15 MINUTES BEFORE OR AFTER BREAKFAST AND 38 UNITS IN EVENING 15 MINUTES BEFORE OR AFTER DINNER                Patient is no longer taking -.  I corrected the med list to reflect this.  I did not stop these medications.      Dictated utilizing Dragon dictation  "

## 2024-09-04 ENCOUNTER — TELEPHONE (OUTPATIENT)
Dept: CARDIOLOGY | Facility: CLINIC | Age: 74
End: 2024-09-04
Payer: MEDICARE

## 2024-12-06 DIAGNOSIS — Z79.4 CONTROLLED TYPE 2 DIABETES MELLITUS WITHOUT COMPLICATION, WITH LONG-TERM CURRENT USE OF INSULIN: ICD-10-CM

## 2024-12-06 DIAGNOSIS — E11.9 CONTROLLED TYPE 2 DIABETES MELLITUS WITHOUT COMPLICATION, WITH LONG-TERM CURRENT USE OF INSULIN: ICD-10-CM

## 2024-12-06 RX ORDER — ATORVASTATIN CALCIUM 40 MG/1
40 TABLET, FILM COATED ORAL DAILY
Qty: 90 TABLET | Refills: 3 | OUTPATIENT
Start: 2024-12-06

## 2025-04-01 RX ORDER — AMLODIPINE BESYLATE 5 MG/1
5 TABLET ORAL EVERY EVENING
Qty: 90 TABLET | Refills: 3 | Status: SHIPPED | OUTPATIENT
Start: 2025-04-01

## 2025-04-01 RX ORDER — AMLODIPINE BESYLATE 2.5 MG/1
2.5 TABLET ORAL DAILY
Qty: 90 TABLET | Refills: 3 | Status: SHIPPED | OUTPATIENT
Start: 2025-04-01

## 2025-04-01 NOTE — TELEPHONE ENCOUNTER
Lv 9/3/24 mkd   Nxt 9/4/25 sw   Labs 3/1/24    Return for Follow-up with APRN in 1 year and me in 2 years.

## 2025-06-10 RX ORDER — LISINOPRIL 40 MG/1
TABLET ORAL
Qty: 180 TABLET | Refills: 2 | Status: SHIPPED | OUTPATIENT
Start: 2025-06-10

## (undated) DEVICE — ANTIBACTERIAL UNDYED BRAIDED (POLYGLACTIN 910), SYNTHETIC ABSORBABLE SUTURE: Brand: COATED VICRYL

## (undated) DEVICE — PK ORTHO MINOR TOWER 40

## (undated) DEVICE — Device: Brand: DEFENDO AIR/WATER/SUCTION AND BIOPSY VALVE

## (undated) DEVICE — SYR LL TP 10ML STRL

## (undated) DEVICE — SUT PROLN 1 TP1 60IN 8824G

## (undated) DEVICE — SPLNT ORTHOGLASS 3INX15FT SN

## (undated) DEVICE — LOU LAP SIGMOID COLON: Brand: MEDLINE INDUSTRIES, INC.

## (undated) DEVICE — DISPOSABLE TOURNIQUET CUFF SINGLE BLADDER, SINGLE PORT AND QUICK CONNECT CONNECTOR: Brand: COLOR CUFF

## (undated) DEVICE — ECHELON FLEX 60 ARTICULATING ENDOSCOPIC LINEAR CUTTER (NO CARTRIDGE): Brand: ECHELON FLEX ENDOPATH

## (undated) DEVICE — BNDG ELAS ELITE V/CLOSE 3IN 5YD LF STRL

## (undated) DEVICE — 3M™ STERI-STRIP™ COMPOUND BENZOIN TINCTURE 40 BAGS/CARTON 4 CARTONS/CASE C1544: Brand: 3M™ STERI-STRIP™

## (undated) DEVICE — MARKR SPOT ENDOSCOPIC LESION INJ

## (undated) DEVICE — THE CARR-LOCKE INJECTION NEEDLE IS A SINGLE USE, DISPOSABLE, FLEXIBLE SHEATH INJECTION NEEDLE USED FOR THE INJECTION OF VARIOUS TYPES OF MEDIA THROUGH FLEXIBLE ENDOSCOPES.

## (undated) DEVICE — ENDOCUT SCISSOR TIP, DISPOSABLE: Brand: RENEW

## (undated) DEVICE — CANNULA,ADULT,SOFT-TOUCH,7'TUBE,UC: Brand: PENDING

## (undated) DEVICE — SPNG LAP 18X18IN LF STRL PK/5

## (undated) DEVICE — THE TORRENT IRRIGATION SCOPE CONNECTOR IS USED WITH THE TORRENT IRRIGATION TUBING TO PROVIDE IRRIGATION FLUIDS SUCH AS STERILE WATER DURING GASTROINTESTINAL ENDOSCOPIC PROCEDURES WHEN USED IN CONJUNCTION WITH AN IRRIGATION PUMP (OR ELECTROSURGICAL UNIT).: Brand: TORRENT

## (undated) DEVICE — ENDOPATH XCEL BLADELESS TROCARS WITH STABILITY SLEEVES: Brand: ENDOPATH XCEL

## (undated) DEVICE — ARM SLING: Brand: DEROYAL

## (undated) DEVICE — DRSNG SURESITE WNDW 4X4.5

## (undated) DEVICE — ENDOPATH XCEL UNIVERSAL TROCAR STABLILITY SLEEVES: Brand: ENDOPATH XCEL

## (undated) DEVICE — DRSNG WND GZ CURAD OIL EMULSION 3X3IN STRL

## (undated) DEVICE — CANN NASL CO2 TRULINK W/O2 A/

## (undated) DEVICE — 3M™ STERI-STRIP™ REINFORCED ADHESIVE SKIN CLOSURES, R1547, 1/2 IN X 4 IN (12 MM X 100 MM), 6 STRIPS/ENVELOPE: Brand: 3M™ STERI-STRIP™

## (undated) DEVICE — WOUND RETRACTOR AND PROTECTOR: Brand: ALEXIS WOUND PROTECTOR-RETRACTOR

## (undated) DEVICE — SUT PROLN 1 CT1 30IN 8425H

## (undated) DEVICE — SPNG GZ WOVN 4X4IN 12PLY 10/BX STRL

## (undated) DEVICE — GLV SURG BIOGEL LTX PF 7 1/2

## (undated) DEVICE — SUT VIC 0 TN 27IN DYED JTN0G

## (undated) DEVICE — SUT SILK 3/0 SH CR5 18IN C0135

## (undated) DEVICE — VISUALIZATION SYSTEM: Brand: CLEARIFY

## (undated) DEVICE — SINGLE-USE BIOPSY FORCEPS: Brand: RADIAL JAW 4

## (undated) DEVICE — NDL HYPO PRECISIONGLIDE REG 25G 1 1/2

## (undated) DEVICE — TUBING, SUCTION, 1/4" X 10', STRAIGHT: Brand: MEDLINE

## (undated) DEVICE — HARMONIC ACE +7 LAPAROSCOPIC SHEARS ADVANCED HEMOSTASIS 5MM DIAMETER 36CM SHAFT LENGTH  FOR USE WITH GRAY HAND PIECE ONLY: Brand: HARMONIC ACE

## (undated) DEVICE — GAUZE,SPONGE,FLUFF,6"X6.75",STRL,10/TRAY: Brand: MEDLINE

## (undated) DEVICE — SUT VIC 2/0 TIES 18IN J111T

## (undated) DEVICE — APPL CHLORAPREP W/TINT 26ML ORNG

## (undated) DEVICE — SKIN PREP TRAY W/CHG: Brand: MEDLINE INDUSTRIES, INC.

## (undated) DEVICE — TOTAL TRAY, 16FR 10ML SIL FOLEY, URN: Brand: MEDLINE

## (undated) DEVICE — SUT PROLN 2/0 SH 36IN 8523H

## (undated) DEVICE — GOWN,NON-REINFORCED,SIRUS,SET IN SLV,XXL: Brand: MEDLINE

## (undated) DEVICE — SUT ETHLN 5/0 P3 18IN 698G

## (undated) DEVICE — ELECTRD NDL EZ CLN MOD 2.75IN

## (undated) DEVICE — GLV SURG BIOGEL LTX PF 8